# Patient Record
Sex: FEMALE | Race: WHITE | Employment: OTHER | URBAN - METROPOLITAN AREA
[De-identification: names, ages, dates, MRNs, and addresses within clinical notes are randomized per-mention and may not be internally consistent; named-entity substitution may affect disease eponyms.]

---

## 2017-04-07 ENCOUNTER — OFFICE VISIT (OUTPATIENT)
Dept: INTERNAL MEDICINE CLINIC | Age: 67
End: 2017-04-07

## 2017-04-07 VITALS
SYSTOLIC BLOOD PRESSURE: 138 MMHG | WEIGHT: 154.6 LBS | BODY MASS INDEX: 23.43 KG/M2 | DIASTOLIC BLOOD PRESSURE: 70 MMHG | HEIGHT: 68 IN | HEART RATE: 88 BPM

## 2017-04-07 DIAGNOSIS — Z00.00 ROUTINE GENERAL MEDICAL EXAMINATION AT A HEALTH CARE FACILITY: Primary | ICD-10-CM

## 2017-04-07 LAB
ALBUMIN SERPL-MCNC: 4.8 G/DL (ref 3.4–5)
ANION GAP SERPL CALCULATED.3IONS-SCNC: 18 MMOL/L (ref 3–16)
BUN BLDV-MCNC: 19 MG/DL (ref 7–20)
CALCIUM SERPL-MCNC: 10 MG/DL (ref 8.3–10.6)
CHLORIDE BLD-SCNC: 96 MMOL/L (ref 99–110)
CHOLESTEROL, TOTAL: 206 MG/DL (ref 0–199)
CO2: 24 MMOL/L (ref 21–32)
CREAT SERPL-MCNC: 0.7 MG/DL (ref 0.6–1.2)
GFR AFRICAN AMERICAN: >60
GFR NON-AFRICAN AMERICAN: >60
GLUCOSE BLD-MCNC: 97 MG/DL (ref 70–99)
HDLC SERPL-MCNC: 116 MG/DL (ref 40–60)
LDL CHOLESTEROL CALCULATED: 82 MG/DL
PHOSPHORUS: 3.7 MG/DL (ref 2.5–4.9)
POTASSIUM SERPL-SCNC: 4.5 MMOL/L (ref 3.5–5.1)
SODIUM BLD-SCNC: 138 MMOL/L (ref 136–145)
TRIGL SERPL-MCNC: 40 MG/DL (ref 0–150)
VLDLC SERPL CALC-MCNC: 8 MG/DL

## 2017-04-07 PROCEDURE — G0438 PPPS, INITIAL VISIT: HCPCS | Performed by: INTERNAL MEDICINE

## 2017-04-07 PROCEDURE — 90670 PCV13 VACCINE IM: CPT | Performed by: INTERNAL MEDICINE

## 2017-04-07 PROCEDURE — G0009 ADMIN PNEUMOCOCCAL VACCINE: HCPCS | Performed by: INTERNAL MEDICINE

## 2017-04-07 RX ORDER — LOVASTATIN 20 MG/1
20 TABLET ORAL NIGHTLY
Qty: 90 TABLET | Refills: 3 | Status: SHIPPED | OUTPATIENT
Start: 2017-04-07 | End: 2018-03-12 | Stop reason: SDUPTHER

## 2017-04-07 RX ORDER — LISINOPRIL AND HYDROCHLOROTHIAZIDE 12.5; 1 MG/1; MG/1
1 TABLET ORAL DAILY
Qty: 90 TABLET | Refills: 3 | Status: SHIPPED | OUTPATIENT
Start: 2017-04-07 | End: 2018-03-12 | Stop reason: SDUPTHER

## 2017-04-07 RX ORDER — CHOLECALCIFEROL (VITAMIN D3) 125 MCG
500 CAPSULE ORAL DAILY
COMMUNITY

## 2017-04-07 ASSESSMENT — LIFESTYLE VARIABLES
AUDIT-C TOTAL SCORE: 4
HOW OFTEN DO YOU HAVE SIX OR MORE DRINKS ON ONE OCCASION: 0
HOW MANY STANDARD DRINKS CONTAINING ALCOHOL DO YOU HAVE ON A TYPICAL DAY: 0
HOW OFTEN DO YOU HAVE A DRINK CONTAINING ALCOHOL: 4

## 2017-04-07 ASSESSMENT — ANXIETY QUESTIONNAIRES
GAD7 TOTAL SCORE: 2
GAD7 TOTAL SCORE: 2

## 2017-04-07 ASSESSMENT — PATIENT HEALTH QUESTIONNAIRE - PHQ9: SUM OF ALL RESPONSES TO PHQ QUESTIONS 1-9: 0

## 2017-09-27 ENCOUNTER — NURSE ONLY (OUTPATIENT)
Dept: INTERNAL MEDICINE CLINIC | Age: 67
End: 2017-09-27

## 2017-09-27 DIAGNOSIS — Z23 NEED FOR INFLUENZA VACCINATION: Primary | ICD-10-CM

## 2017-09-27 PROCEDURE — 90662 IIV NO PRSV INCREASED AG IM: CPT | Performed by: INTERNAL MEDICINE

## 2017-09-27 PROCEDURE — G0008 ADMIN INFLUENZA VIRUS VAC: HCPCS | Performed by: INTERNAL MEDICINE

## 2017-11-07 ENCOUNTER — HOSPITAL ENCOUNTER (OUTPATIENT)
Dept: MAMMOGRAPHY | Age: 67
Discharge: HOME | End: 2017-11-07
Attending: INTERNAL MEDICINE | Admitting: INTERNAL MEDICINE

## 2017-11-07 DIAGNOSIS — Z12.31 VISIT FOR SCREENING MAMMOGRAM: ICD-10-CM

## 2018-01-25 ENCOUNTER — HOSPITAL ENCOUNTER (OUTPATIENT)
Dept: ENDOSCOPY | Age: 68
Discharge: HOME | End: 2018-01-25
Attending: INTERNAL MEDICINE | Admitting: INTERNAL MEDICINE

## 2018-01-25 NOTE — ANESTHESIA PRE-OP
(MEVACOR) 20 MG tablet Take 1 tablet by mouth nightly 90 tablet 3    Calcium Carbonate-Vitamin D (CALCIUM + D PO) Take 1 tablet by mouth 2 times daily.  Glucosamine-Chondroit-Vit C-Mn (GLUCOSAMINE CHONDR 1500 COMPLX) CAPS Take 1 tablet by mouth 2 times daily.  Multiple Vitamins-Minerals (ONE-A-DAY WOMENS 50 PLUS PO) Take 1 tablet by mouth daily.  Loratadine (CLARITIN PO) Take 1 tablet by mouth daily.  Cranberry 1000 MG CAPS Take 1 tablet by mouth 2 times daily.  niacin 250 MG tablet Take 250 mg by mouth daily (with breakfast).  Polyethylene Glycol 3350 (MIRALAX PO) Take  by mouth. No current facility-administered medications for this encounter. Allergies:  No Known Allergies    Problem List:    Patient Active Problem List   Diagnosis Code    Hyperlipidemia E78.5    Essential hypertension, benign I10       Past Medical History:        Diagnosis Date    Hyperlipidemia     Hypertension        Past Surgical History:        Procedure Laterality Date    MIDDLE EAR SURGERY      Done 2 times.  VARICOSE VEIN SURGERY         Social History:    Social History   Substance Use Topics    Smoking status: Never Smoker    Smokeless tobacco: Never Used    Alcohol use 8.4 oz/week     14 Glasses of wine per week      Comment: Couple of drinks nightly                                 Counseling given: Not Answered      Vital Signs (Current): There were no vitals filed for this visit. BP Readings from Last 3 Encounters:   04/07/17 138/70   04/01/16 128/76   10/01/15 122/78       NPO Status:                                                                                 BMI:   Wt Readings from Last 3 Encounters:   01/16/18 150 lb (68 kg)   04/07/17 154 lb 9.6 oz (70.1 kg)   04/01/16 157 lb 12.8 oz (71.6 kg)     There is no height or weight on file to calculate BMI.     Anesthesia Evaluation  Patient summary reviewed and Nursing notes

## 2018-01-26 ENCOUNTER — PATIENT MESSAGE (OUTPATIENT)
Dept: INTERNAL MEDICINE CLINIC | Age: 68
End: 2018-01-26

## 2018-01-26 DIAGNOSIS — M25.561 CHRONIC PAIN OF RIGHT KNEE: Primary | ICD-10-CM

## 2018-01-26 DIAGNOSIS — M17.11 ARTHRITIS OF RIGHT KNEE: Primary | ICD-10-CM

## 2018-01-26 DIAGNOSIS — G89.29 CHRONIC PAIN OF RIGHT KNEE: Primary | ICD-10-CM

## 2018-01-29 ENCOUNTER — HOSPITAL ENCOUNTER (OUTPATIENT)
Dept: OTHER | Age: 68
Discharge: HOME | End: 2018-01-29
Attending: INTERNAL MEDICINE | Admitting: INTERNAL MEDICINE

## 2018-01-29 DIAGNOSIS — M25.561 CHRONIC PAIN OF RIGHT KNEE: ICD-10-CM

## 2018-01-29 DIAGNOSIS — G89.29 CHRONIC PAIN OF RIGHT KNEE: ICD-10-CM

## 2018-02-05 ENCOUNTER — OFFICE VISIT (OUTPATIENT)
Dept: ORTHOPEDIC SURGERY | Age: 68
End: 2018-02-05

## 2018-02-05 VITALS
HEIGHT: 68 IN | HEART RATE: 84 BPM | BODY MASS INDEX: 22.28 KG/M2 | DIASTOLIC BLOOD PRESSURE: 73 MMHG | SYSTOLIC BLOOD PRESSURE: 126 MMHG | WEIGHT: 147 LBS

## 2018-02-05 DIAGNOSIS — M17.11 PRIMARY OSTEOARTHRITIS OF RIGHT KNEE: Primary | ICD-10-CM

## 2018-02-05 PROCEDURE — 20610 DRAIN/INJ JOINT/BURSA W/O US: CPT | Performed by: ORTHOPAEDIC SURGERY

## 2018-02-05 PROCEDURE — 99203 OFFICE O/P NEW LOW 30 MIN: CPT | Performed by: ORTHOPAEDIC SURGERY

## 2018-02-05 RX ORDER — BETAMETHASONE SODIUM PHOSPHATE AND BETAMETHASONE ACETATE 3; 3 MG/ML; MG/ML
12 INJECTION, SUSPENSION INTRA-ARTICULAR; INTRALESIONAL; INTRAMUSCULAR; SOFT TISSUE ONCE
Status: COMPLETED | OUTPATIENT
Start: 2018-02-05 | End: 2018-02-05

## 2018-02-05 RX ADMIN — BETAMETHASONE SODIUM PHOSPHATE AND BETAMETHASONE ACETATE 12 MG: 3; 3 INJECTION, SUSPENSION INTRA-ARTICULAR; INTRALESIONAL; INTRAMUSCULAR; SOFT TISSUE at 17:03

## 2018-02-05 NOTE — PROGRESS NOTES
Date of Encounter: 2/5/2018  Patient [facility] Record Number: [id number]    Chief Complaint   Patient presents with    Knee Pain     New, RT Knee pain. Dr Macedo Billing referral. XR done 1/29/18. pain for 2 years, NKI        History of Present Illness:  Dale Nguyen is a 78 y.o. female here for evaluation of her right knee. Her current symptoms are described below and I reviewed them with her today. Patient presented at the recommendation of Dr. Valerie Real, her PCP. Patient has been having right knee pain for several years now that is intermittent however the last several months pain has become much more persistent. Pain is worse with activity. She rates her pain 6/10. She does occasionally have popping in the right knee. She has noticed herself starting to limp and states after much activity she then begins having low back pain because of the way she is walking. She denies pain in her right hip or ankle. She denies numbness or tingling in the right leg. She has found herself taking more Advil on a daily basis. Pain Assessment  Location of Pain: Knee  Location Modifiers: Right  Severity of Pain: 6  Quality of Pain: Popping, Aching, Dull  Duration of Pain: A few days  Frequency of Pain: Constant  Aggravating Factors: Walking, Bending  Limiting Behavior: Some  Relieving Factors: Rest  Result of Injury: No  Work-Related Injury: No  Are there other pain locations you wish to document?: No    Past Medical History:   Diagnosis Date    Hyperlipidemia     Hypertension        Past Surgical History:   Procedure Laterality Date    MIDDLE EAR SURGERY      Done 2 times.      VARICOSE VEIN SURGERY         Current Outpatient Prescriptions   Medication Sig Dispense Refill    ibuprofen (ADVIL;MOTRIN) 200 MG tablet Take 200 mg by mouth every 6 hours as needed for Pain      vitamin B-12 (CYANOCOBALAMIN) 500 MCG tablet Take 500 mcg by mouth daily      lisinopril-hydrochlorothiazide (PRINZIDE;ZESTORETIC) 10-12.5 MG per tablet Take 1 tablet by mouth daily 90 tablet 3    lovastatin (MEVACOR) 20 MG tablet Take 1 tablet by mouth nightly 90 tablet 3    Calcium Carbonate-Vitamin D (CALCIUM + D PO) Take 1 tablet by mouth 2 times daily.  Glucosamine-Chondroit-Vit C-Mn (GLUCOSAMINE CHONDR 1500 COMPLX) CAPS Take 1 tablet by mouth 2 times daily.  Multiple Vitamins-Minerals (ONE-A-DAY WOMENS 50 PLUS PO) Take 1 tablet by mouth daily.  Loratadine (CLARITIN PO) Take 1 tablet by mouth daily.  Cranberry 1000 MG CAPS Take 1 tablet by mouth 2 times daily.  niacin 250 MG tablet Take 250 mg by mouth daily (with breakfast).  Polyethylene Glycol 3350 (MIRALAX PO) Take  by mouth. Current Facility-Administered Medications   Medication Dose Route Frequency Provider Last Rate Last Dose    betamethasone acetate-betamethasone sodium phosphate (CELESTONE) injection 12 mg  12 mg Intra-articular Once General MD Diann          allergies, social and family histories were reviewed and updated as appropriate. Review of Systems:  Relevant review of systems reviewed and available in the patient's chart    Vital Signs:  /73   Pulse 84   Ht 5' 8\" (1.727 m)   Wt 147 lb (66.7 kg)   LMP 02/14/1992   BMI 22.35 kg/m²     General Exam:   Constitutional: She is adequately groomed with no evidence of malnutrition  Mental Status: She is oriented to time, place and person. Normal mentation and affect for age. Lymphatic: The lymphatic examination bilaterally reveals all areas to be without enlargement or induration. Vascular: Examination reveals no swelling or calf tenderness. Peripheral pulses are palpable and 2+. Neurological: She has good coordination. There is no focal weakness or sensory deficit. Right Knee Examination:    Inspection:  There is mild appreciable joint effusion. There is no erythema or signs of septic joint.   Patient is mildly valgus    Palpation:  Patient has mild tenderness on understands and accepts this course of care    During this examination, I Nicole Shetty PA-C, functioned as a scribe for Dr. Juan Miguel Vann. The history taking and physical examination were performed by Dr. Juan Miguel Vann. The appointment was performed between the patient and Dr. Juan Miguel Vann. The above encounter was performed by me personally with Nicole Shetty PA-C serving solely as a scribe. The above note is an accurate reflection of that encounter and has been reviewed for content by me. The right knee was prepped with Betadine and 2 mL of betamethasone mixed with 5 mL 1% lidocaine plain were instilled with careful aspiration and injection under aseptic technique. She tolerated this well. [facility], [address] [place] [address] and Sports Medicine  02/05/18  4:48 PM

## 2018-03-12 ENCOUNTER — TELEPHONE (OUTPATIENT)
Dept: ORTHOPEDIC SURGERY | Age: 68
End: 2018-03-12

## 2018-03-12 DIAGNOSIS — M17.11 PRIMARY OSTEOARTHRITIS OF RIGHT KNEE: Primary | ICD-10-CM

## 2018-03-12 RX ORDER — LOVASTATIN 20 MG/1
TABLET ORAL
Qty: 90 TABLET | Refills: 0 | Status: SHIPPED | OUTPATIENT
Start: 2018-03-12 | End: 2018-04-17 | Stop reason: SDUPTHER

## 2018-03-12 RX ORDER — LISINOPRIL AND HYDROCHLOROTHIAZIDE 12.5; 1 MG/1; MG/1
TABLET ORAL
Qty: 90 TABLET | Refills: 0 | Status: SHIPPED | OUTPATIENT
Start: 2018-03-12 | End: 2018-04-17 | Stop reason: SDUPTHER

## 2018-03-23 ENCOUNTER — OFFICE VISIT (OUTPATIENT)
Dept: ORTHOPEDIC SURGERY | Age: 68
End: 2018-03-23

## 2018-03-23 VITALS
DIASTOLIC BLOOD PRESSURE: 89 MMHG | WEIGHT: 147 LBS | HEIGHT: 68 IN | SYSTOLIC BLOOD PRESSURE: 169 MMHG | HEART RATE: 78 BPM | BODY MASS INDEX: 22.28 KG/M2

## 2018-03-23 DIAGNOSIS — M17.11 PRIMARY OSTEOARTHRITIS OF RIGHT KNEE: Primary | ICD-10-CM

## 2018-03-23 PROCEDURE — 99999 PR OFFICE/OUTPT VISIT,PROCEDURE ONLY: CPT | Performed by: ORTHOPAEDIC SURGERY

## 2018-03-23 PROCEDURE — 20610 DRAIN/INJ JOINT/BURSA W/O US: CPT | Performed by: ORTHOPAEDIC SURGERY

## 2018-03-23 NOTE — PROGRESS NOTES
Subjective:  right knee pain. She is here for a Synvisc One injectionfor the  right knee(s). Objective:   Blood pressure (!) 169/89, pulse 78, height 5' 8\" (1.727 m), weight 147 lb (66.7 kg), last menstrual period 02/14/1992. There is a milld joint effusion noted of the right knee(s). There is mild pain with range of motion testing. There is no significant  instability noted. Neuro exam grossly intact both lower extremities. Intact sensation to light touch. Motor exam 4+ to 5/5 in all major motor groups. Negative Dumont's sign. Skin is warm, dry and intact with out erythema or significant increased temperature around the knee joint(s). Assessment:  Degenerative Joint Disease of the right Knee(s). Plan:  Discussed Synvisc One injection(s) including all  risks and benefits including increased pain, drug reaction, infection, bleeding, lack of improvement and  neurovascular injury. All the questions were answered. PROCEDURE NOTE:  PRE-PROCEDURE DIAGNOSIS: right DJD knee(s)  POST-PROCEDURE DIAGNOSIS: right DJD knee(s)    With the patient's permission, her right knee(s) was prepped in standard sterile fashion with  Alcohol. The prefilled injection of 4mL the preferred Monovisc was injected into the right lateral joint space compartment(s) without difficulty. The patient tolerated the procedure(s) well without difficulty. A band-aid was applied. POST-PROCEDURE INSTRUCTIONS GIVEN TO PATIENT: The patient was advised to ice the knee for 15-20 minutes to relieve any injection site related pain. Decrease activity for the next 24 to 48 hours. May use prescription or OTC pain relievers as needed      FOLLOW-UP:   As directed or call or return to clinic if these symptoms worsen or fail to improve as anticipated. If at any time you are concerned you may contact the office for further instructions or care.

## 2018-04-04 ENCOUNTER — TELEPHONE (OUTPATIENT)
Dept: INTERNAL MEDICINE CLINIC | Age: 68
End: 2018-04-04

## 2018-04-04 DIAGNOSIS — I10 ESSENTIAL HYPERTENSION: Primary | ICD-10-CM

## 2018-04-04 DIAGNOSIS — E78.00 HYPERCHOLESTEROLEMIA: ICD-10-CM

## 2018-04-12 RX ORDER — THIAMINE HCL 100 MG
1 TABLET ORAL DAILY
COMMUNITY
End: 2022-08-22 | Stop reason: ALTCHOICE

## 2018-04-17 ENCOUNTER — OFFICE VISIT (OUTPATIENT)
Dept: INTERNAL MEDICINE CLINIC | Age: 68
End: 2018-04-17

## 2018-04-17 VITALS
HEART RATE: 88 BPM | HEIGHT: 68 IN | WEIGHT: 148.2 LBS | BODY MASS INDEX: 22.46 KG/M2 | SYSTOLIC BLOOD PRESSURE: 116 MMHG | DIASTOLIC BLOOD PRESSURE: 70 MMHG

## 2018-04-17 DIAGNOSIS — E78.00 HYPERCHOLESTEROLEMIA: ICD-10-CM

## 2018-04-17 DIAGNOSIS — Z00.00 ROUTINE GENERAL MEDICAL EXAMINATION AT A HEALTH CARE FACILITY: Primary | ICD-10-CM

## 2018-04-17 DIAGNOSIS — I10 ESSENTIAL HYPERTENSION: ICD-10-CM

## 2018-04-17 LAB
ALBUMIN SERPL-MCNC: 4.7 G/DL (ref 3.4–5)
ANION GAP SERPL CALCULATED.3IONS-SCNC: 15 MMOL/L (ref 3–16)
BUN BLDV-MCNC: 16 MG/DL (ref 7–20)
CALCIUM SERPL-MCNC: 10.1 MG/DL (ref 8.3–10.6)
CHLORIDE BLD-SCNC: 92 MMOL/L (ref 99–110)
CHOLESTEROL, FASTING: 172 MG/DL (ref 0–199)
CO2: 26 MMOL/L (ref 21–32)
CREAT SERPL-MCNC: 0.7 MG/DL (ref 0.6–1.2)
GFR AFRICAN AMERICAN: >60
GFR NON-AFRICAN AMERICAN: >60
GLUCOSE BLD-MCNC: 100 MG/DL (ref 70–99)
HDLC SERPL-MCNC: 86 MG/DL (ref 40–60)
LDL CHOLESTEROL CALCULATED: 73 MG/DL
PHOSPHORUS: 4.2 MG/DL (ref 2.5–4.9)
POTASSIUM SERPL-SCNC: 4.6 MMOL/L (ref 3.5–5.1)
SODIUM BLD-SCNC: 133 MMOL/L (ref 136–145)
TRIGLYCERIDE, FASTING: 64 MG/DL (ref 0–150)
VLDLC SERPL CALC-MCNC: 13 MG/DL

## 2018-04-17 PROCEDURE — G0009 ADMIN PNEUMOCOCCAL VACCINE: HCPCS | Performed by: INTERNAL MEDICINE

## 2018-04-17 PROCEDURE — G0439 PPPS, SUBSEQ VISIT: HCPCS | Performed by: INTERNAL MEDICINE

## 2018-04-17 PROCEDURE — 90732 PPSV23 VACC 2 YRS+ SUBQ/IM: CPT | Performed by: INTERNAL MEDICINE

## 2018-04-17 RX ORDER — LISINOPRIL AND HYDROCHLOROTHIAZIDE 12.5; 1 MG/1; MG/1
1 TABLET ORAL DAILY
Qty: 90 TABLET | Refills: 3 | Status: SHIPPED | OUTPATIENT
Start: 2018-04-17 | End: 2019-06-03 | Stop reason: SDUPTHER

## 2018-04-17 RX ORDER — LOVASTATIN 20 MG/1
20 TABLET ORAL NIGHTLY
Qty: 90 TABLET | Refills: 3 | Status: SHIPPED | OUTPATIENT
Start: 2018-04-17 | End: 2019-06-03 | Stop reason: SDUPTHER

## 2018-04-17 ASSESSMENT — ANXIETY QUESTIONNAIRES: GAD7 TOTAL SCORE: 1

## 2018-04-17 ASSESSMENT — LIFESTYLE VARIABLES
HOW OFTEN DO YOU HAVE SIX OR MORE DRINKS ON ONE OCCASION: 0
AUDIT-C TOTAL SCORE: 4
HOW MANY STANDARD DRINKS CONTAINING ALCOHOL DO YOU HAVE ON A TYPICAL DAY: 0
HOW OFTEN DO YOU HAVE A DRINK CONTAINING ALCOHOL: 4

## 2018-04-17 ASSESSMENT — PATIENT HEALTH QUESTIONNAIRE - PHQ9: SUM OF ALL RESPONSES TO PHQ QUESTIONS 1-9: 0

## 2018-09-24 ENCOUNTER — OFFICE VISIT (OUTPATIENT)
Dept: AUDIOLOGY | Age: 68
End: 2018-09-24
Payer: MEDICARE

## 2018-09-24 PROCEDURE — 92553 AUDIOMETRY AIR & BONE: CPT | Performed by: AUDIOLOGIST

## 2018-09-26 ENCOUNTER — NURSE ONLY (OUTPATIENT)
Dept: INTERNAL MEDICINE CLINIC | Age: 68
End: 2018-09-26
Payer: MEDICARE

## 2018-09-26 DIAGNOSIS — Z23 NEED FOR INFLUENZA VACCINATION: Primary | ICD-10-CM

## 2018-09-26 PROCEDURE — 90662 IIV NO PRSV INCREASED AG IM: CPT | Performed by: INTERNAL MEDICINE

## 2018-09-26 PROCEDURE — G0008 ADMIN INFLUENZA VIRUS VAC: HCPCS | Performed by: INTERNAL MEDICINE

## 2018-11-14 ENCOUNTER — HOSPITAL ENCOUNTER (OUTPATIENT)
Dept: WOMENS IMAGING | Age: 68
Discharge: HOME | End: 2018-11-14
Payer: MEDICARE

## 2018-11-14 DIAGNOSIS — Z12.31 ENCOUNTER FOR SCREENING MAMMOGRAM FOR BREAST CANCER: ICD-10-CM

## 2018-11-14 PROCEDURE — 77063 BREAST TOMOSYNTHESIS BI: CPT

## 2019-02-19 ENCOUNTER — OFFICE VISIT (OUTPATIENT)
Dept: AUDIOLOGY | Age: 69
End: 2019-02-19

## 2019-02-19 DIAGNOSIS — Z97.4 WEARS HEARING AID: Primary | ICD-10-CM

## 2019-02-19 PROCEDURE — 99999 PR OFFICE/OUTPT VISIT,PROCEDURE ONLY: CPT | Performed by: AUDIOLOGIST

## 2019-04-22 ENCOUNTER — CLINICAL DOCUMENTATION (OUTPATIENT)
Dept: AUDIOLOGY | Age: 69
End: 2019-04-22

## 2019-05-07 ENCOUNTER — OFFICE VISIT (OUTPATIENT)
Dept: AUDIOLOGY | Age: 69
End: 2019-05-07

## 2019-05-07 DIAGNOSIS — Z46.1 HEARING AID FITTING OR ADJUSTMENT: Primary | ICD-10-CM

## 2019-05-07 PROCEDURE — 99999 PR OFFICE/OUTPT VISIT,PROCEDURE ONLY: CPT | Performed by: AUDIOLOGIST

## 2019-06-03 ENCOUNTER — OFFICE VISIT (OUTPATIENT)
Dept: AUDIOLOGY | Age: 69
End: 2019-06-03

## 2019-06-03 ENCOUNTER — OFFICE VISIT (OUTPATIENT)
Dept: INTERNAL MEDICINE CLINIC | Age: 69
End: 2019-06-03
Payer: MEDICARE

## 2019-06-03 ENCOUNTER — OFFICE VISIT (OUTPATIENT)
Dept: ORTHOPEDIC SURGERY | Age: 69
End: 2019-06-03
Payer: MEDICARE

## 2019-06-03 VITALS
HEIGHT: 68 IN | SYSTOLIC BLOOD PRESSURE: 126 MMHG | HEART RATE: 72 BPM | BODY MASS INDEX: 21.98 KG/M2 | DIASTOLIC BLOOD PRESSURE: 72 MMHG | WEIGHT: 145 LBS

## 2019-06-03 VITALS
HEIGHT: 68 IN | BODY MASS INDEX: 21.98 KG/M2 | SYSTOLIC BLOOD PRESSURE: 152 MMHG | DIASTOLIC BLOOD PRESSURE: 83 MMHG | WEIGHT: 145 LBS | HEART RATE: 69 BPM

## 2019-06-03 DIAGNOSIS — Z00.00 ROUTINE GENERAL MEDICAL EXAMINATION AT A HEALTH CARE FACILITY: Primary | ICD-10-CM

## 2019-06-03 DIAGNOSIS — M17.11 PRIMARY OSTEOARTHRITIS OF RIGHT KNEE: Primary | ICD-10-CM

## 2019-06-03 DIAGNOSIS — Z92.89 HISTORY OF USE OF HEARING AID IN LEFT EAR: Primary | ICD-10-CM

## 2019-06-03 PROCEDURE — 99213 OFFICE O/P EST LOW 20 MIN: CPT | Performed by: ORTHOPAEDIC SURGERY

## 2019-06-03 PROCEDURE — 99999 PR OFFICE/OUTPT VISIT,PROCEDURE ONLY: CPT | Performed by: AUDIOLOGIST

## 2019-06-03 PROCEDURE — G0439 PPPS, SUBSEQ VISIT: HCPCS | Performed by: INTERNAL MEDICINE

## 2019-06-03 RX ORDER — LOVASTATIN 20 MG/1
20 TABLET ORAL NIGHTLY
Qty: 90 TABLET | Refills: 3 | Status: SHIPPED | OUTPATIENT
Start: 2019-06-03 | End: 2020-01-22 | Stop reason: SDUPTHER

## 2019-06-03 RX ORDER — LISINOPRIL AND HYDROCHLOROTHIAZIDE 12.5; 1 MG/1; MG/1
1 TABLET ORAL DAILY
Qty: 90 TABLET | Refills: 3 | Status: SHIPPED | OUTPATIENT
Start: 2019-06-03 | End: 2020-01-22 | Stop reason: SDUPTHER

## 2019-06-03 ASSESSMENT — LIFESTYLE VARIABLES
HOW OFTEN DURING THE LAST YEAR HAVE YOU NEEDED AN ALCOHOLIC DRINK FIRST THING IN THE MORNING TO GET YOURSELF GOING AFTER A NIGHT OF HEAVY DRINKING: 0
HAS A RELATIVE, FRIEND, DOCTOR, OR ANOTHER HEALTH PROFESSIONAL EXPRESSED CONCERN ABOUT YOUR DRINKING OR SUGGESTED YOU CUT DOWN: 0
HOW MANY STANDARD DRINKS CONTAINING ALCOHOL DO YOU HAVE ON A TYPICAL DAY: 0
HOW OFTEN DURING THE LAST YEAR HAVE YOU FOUND THAT YOU WERE NOT ABLE TO STOP DRINKING ONCE YOU HAD STARTED: 0
HOW OFTEN DO YOU HAVE A DRINK CONTAINING ALCOHOL: 4
HAVE YOU OR SOMEONE ELSE BEEN INJURED AS A RESULT OF YOUR DRINKING: 0
AUDIT TOTAL SCORE: 4
AUDIT-C TOTAL SCORE: 4
HOW OFTEN DURING THE LAST YEAR HAVE YOU BEEN UNABLE TO REMEMBER WHAT HAPPENED THE NIGHT BEFORE BECAUSE YOU HAD BEEN DRINKING: 0
HOW OFTEN DO YOU HAVE SIX OR MORE DRINKS ON ONE OCCASION: 0
HOW OFTEN DURING THE LAST YEAR HAVE YOU FAILED TO DO WHAT WAS NORMALLY EXPECTED FROM YOU BECAUSE OF DRINKING: 0
HOW OFTEN DURING THE LAST YEAR HAVE YOU HAD A FEELING OF GUILT OR REMORSE AFTER DRINKING: 0

## 2019-06-03 ASSESSMENT — ANXIETY QUESTIONNAIRES: GAD7 TOTAL SCORE: 1

## 2019-06-03 ASSESSMENT — PATIENT HEALTH QUESTIONNAIRE - PHQ9
SUM OF ALL RESPONSES TO PHQ QUESTIONS 1-9: 0
SUM OF ALL RESPONSES TO PHQ QUESTIONS 1-9: 0

## 2019-06-03 NOTE — PATIENT INSTRUCTIONS
Personalized Preventive Plan for Nik Sensing - 6/3/2019  Medicare offers a range of preventive health benefits. Some of the tests and screenings are paid in full while other may be subject to a deductible, co-insurance, and/or copay. Some of these benefits include a comprehensive review of your medical history including lifestyle, illnesses that may run in your family, and various assessments and screenings as appropriate. After reviewing your medical record and screening and assessments performed today your provider may have ordered immunizations, labs, imaging, and/or referrals for you. A list of these orders (if applicable) as well as your Preventive Care list are included within your After Visit Summary for your review. Other Preventive Recommendations:    · A preventive eye exam performed by an eye specialist is recommended every 1-2 years to screen for glaucoma; cataracts, macular degeneration, and other eye disorders. · A preventive dental visit is recommended every 6 months. · Try to get at least 150 minutes of exercise per week or 10,000 steps per day on a pedometer . · Order or download the FREE \"Exercise & Physical Activity: Your Everyday Guide\" from [employer]. Call 4-484.201.1150 or search [employer] online. · You need 5366-9686 mg of calcium and 3521-9811 IU of vitamin D per day. It is possible to meet your calcium requirement with diet alone, but a vitamin D supplement is usually necessary to meet this goal.  · When exposed to the sun, use a sunscreen that protects against both UVA and UVB radiation with an SPF of 30 or greater. Reapply every 2 to 3 hours or after sweating, drying off with a towel, or swimming. · Always wear a seat belt when traveling in a car. Always wear a helmet when riding a bicycle or motorcycle.

## 2019-06-03 NOTE — PROGRESS NOTES
Medicare Annual Wellness Visit  Name: Froylan Vidal Date: 6/3/2019   MRN: [id number] Sex: Female   Age: 80 y.o. Ethnicity: Non-/Non    : 1950 Race: Deedee Pelletier is here for Medicare AWV    Screenings for behavioral, psychosocial and functional/safety risks, and cognitive dysfunction are all negative except as indicated below. These results, as well as other patient data from the [address] form, are documented in Flowsheets linked to this Encounter. No Known Allergies  Prior to Visit Medications    Medication Sig Taking? Authorizing Provider   lovastatin (MEVACOR) 20 MG tablet Take 1 tablet by mouth nightly Yes Riddhi Martinez MD   lisinopril-hydrochlorothiazide (PRINZIDE;ZESTORETIC) 10-12.5 MG per tablet Take 1 tablet by mouth daily Yes Riddhi Martinez MD   Magnesium 500 MG TABS Take 1 tablet by mouth daily Yes Historical Provider, MD   ibuprofen (ADVIL;MOTRIN) 200 MG tablet Take 200 mg by mouth every 6 hours as needed for Pain Yes Historical Provider, MD   vitamin B-12 (CYANOCOBALAMIN) 500 MCG tablet Take 500 mcg by mouth daily Yes Historical Provider, MD   Calcium Carbonate-Vitamin D (CALCIUM + D PO) Take 1 tablet by mouth daily  Yes Historical Provider, MD   Glucosamine-Chondroit-Vit C-Mn (GLUCOSAMINE CHONDR 1500 COMPLX) CAPS Take 1 tablet by mouth daily  Yes Historical Provider, MD   Multiple Vitamins-Minerals (ONE-A-DAY WOMENS 50 PLUS PO) Take 1 tablet by mouth daily. Yes Historical Provider, MD   Loratadine (CLARITIN PO) Take 1 tablet by mouth daily. Yes Historical Provider, MD   Cranberry 1000 MG CAPS Take 1 tablet by mouth daily  Yes Historical Provider, MD   niacin 250 MG tablet Take 250 mg by mouth daily (with breakfast).  Yes Historical Provider, MD   Polyethylene Glycol 3350 (MIRALAX PO) Take by mouth daily  Yes Historical Provider, MD     Past Medical History:   Diagnosis Date    Arthritis     knee    Hyperlipidemia     Hypertension Past Surgical History:   Procedure Laterality Date    MIDDLE EAR SURGERY      Done 2 times.  VARICOSE VEIN SURGERY       Family History   Problem Relation Age of Onset    Mental Illness Mother     Depression Mother     High Blood Pressure Father     Cancer Paternal Aunt        CareTeam (Including outside providers/suppliers regularly involved in providing care):   Patient Care Team:  Abiodun Fenton MD as PCP - General (Internal Medicine)  Abiodun Fenton MD as PCP - [facility]    Wt Readings from Last 3 Encounters:   06/03/19 145 lb (65.8 kg)   06/03/19 145 lb (65.8 kg)   06/03/19 145 lb (65.8 kg)     Vitals:    06/03/19 1437   BP: 126/72   Site: Left Upper Arm   Position: Sitting   Cuff Size: Large Adult   Pulse: 72   Weight: 145 lb (65.8 kg)   Height: 5' 8\" (1.727 m)     Body mass index is 22.05 kg/m². Based upon direct observation of the patient, evaluation of cognition reveals recent and remote memory intact. GEN: WN/WD, NAD  CV: regular rate and rhythm, no murmurs rubs or gallops  Resp: normal effort, clear auscultation bilaterally  No peripheral edema     Patient's complete Health Risk Assessment and screening values have been reviewed and are found in Flowsheets. The following problems were reviewed today and where indicated follow up appointments were made and/or referrals ordered. Positive Risk Factor Screenings with Interventions:     General Health:  General  In general, how would you say your health is?: Very Good  In the past 7 days, have you experienced any of the following?  New or Increased Pain, New or Increased Fatigue, Loneliness, Social Isolation, Stress or Anger?: (!) Loneliness  Do you get the social and emotional support that you need?: Yes  Do you have a Living Will?: Yes  General Health Risk Interventions:  · discussed loneliness, she has good social support    Hearing/Vision:  Hearing/Vision  Do you or your family notice any trouble with your hearing?: (!) Yes  Do you have difficulty driving, watching TV, or doing any of your daily activities because of your eyesight?: No  Have you had an eye exam within the past year?: Yes  Hearing/Vision Interventions:  · Hearing concerns:  has hearing aids    Safety:  Safety  Have all throw rugs been removed or fastened?: (!) No  Do you have non-slip mats in all bathtubs?: Yes  Do all of your stairways have a railing or banister?: Yes  Are your doorways, halls and stairs free of clutter?: Yes  Do you always fasten your seatbelt when you are in a car?: Yes  Safety Interventions:  · Home safety tips provided    Personalized Preventive Plan   Current Health Maintenance Status  Immunization History   Administered Date(s) Administered    Influenza Virus Vaccine 10/01/2015    Influenza, High Dose (Fluzone 65 yrs and older) 09/14/2016, 09/27/2017, 09/26/2018    Pneumococcal 13-valent Conjugate ([id number]) 04/07/2017    Pneumococcal Polysaccharide ([id number]) 04/17/2018        Health Maintenance   Topic Date Due    Hepatitis C screen  1950    Shingles Vaccine (1 of 2) 08/28/2001    Potassium monitoring  04/17/2019    Creatinine monitoring  04/17/2019    DTaP/Tdap/Td vaccine (1 - Tdap) 01/01/2099 (Originally 8/28/1970)    Breast cancer screen  11/14/2020    Lipid screen  04/17/2023    Colon cancer screen colonoscopy  01/25/2028    DEXA (modify frequency per FRAX score)  Completed    Flu vaccine  Completed    Pneumococcal 65+ years Vaccine  Completed     Recommendations for Preventive Services Due: see orders and patient instructions/AVS.  .   Recommended screening schedule for the next 5-10 years is provided to the patient in written form: see Patient Instructions/AVS.

## 2019-06-03 NOTE — PROGRESS NOTES
Patient dropped off damaged left ear aid. earmold displaced from  unit. I will check with [employer] to ensure warranty status is consistent with our records. SUAD Newsome

## 2019-06-04 LAB
ALBUMIN SERPL-MCNC: 4.7 G/DL (ref 3.4–5)
ANION GAP SERPL CALCULATED.3IONS-SCNC: 11 MMOL/L (ref 3–16)
BUN BLDV-MCNC: 13 MG/DL (ref 7–20)
CALCIUM SERPL-MCNC: 10 MG/DL (ref 8.3–10.6)
CHLORIDE BLD-SCNC: 100 MMOL/L (ref 99–110)
CHOLESTEROL, TOTAL: 202 MG/DL (ref 0–199)
CO2: 27 MMOL/L (ref 21–32)
CREAT SERPL-MCNC: 0.6 MG/DL (ref 0.6–1.2)
GFR AFRICAN AMERICAN: >60
GFR NON-AFRICAN AMERICAN: >60
GLUCOSE BLD-MCNC: 91 MG/DL (ref 70–99)
HDLC SERPL-MCNC: 114 MG/DL (ref 40–60)
LDL CHOLESTEROL CALCULATED: 76 MG/DL
PHOSPHORUS: 4 MG/DL (ref 2.5–4.9)
POTASSIUM SERPL-SCNC: 4.9 MMOL/L (ref 3.5–5.1)
SODIUM BLD-SCNC: 138 MMOL/L (ref 136–145)
TRIGL SERPL-MCNC: 58 MG/DL (ref 0–150)
VLDLC SERPL CALC-MCNC: 12 MG/DL

## 2019-06-17 ENCOUNTER — CLINICAL DOCUMENTATION (OUTPATIENT)
Dept: AUDIOLOGY | Age: 69
End: 2019-06-17

## 2019-06-18 ENCOUNTER — OFFICE VISIT (OUTPATIENT)
Dept: AUDIOLOGY | Age: 69
End: 2019-06-18

## 2019-06-18 DIAGNOSIS — Z97.4 WEARS HEARING AID IN LEFT EAR: Primary | ICD-10-CM

## 2019-06-18 PROCEDURE — 99999 PR OFFICE/OUTPT VISIT,PROCEDURE ONLY: CPT | Performed by: AUDIOLOGIST

## 2019-06-18 NOTE — PROGRESS NOTES
Subjective:      Walk-in patient to  device. Reported it needed some fine-tuning. Aid re-calibrated and low-mid frequency gain was increased. SUAD Green

## 2019-06-19 NOTE — PROGRESS NOTES
Josiah Stewart  [id number]  Encounter Date: 6/3/2019  YOB: 1950    Chief Complaint   Patient presents with    Knee Pain     f/u for RT knee pain        History:Ms. Josiah Stewart is here in follow up regarding her right knee arthritis. She last underwent Synvisc One injection in March 2018. She reports she had significant improvement in her symptoms for almost 12 months. Last couple of months she started to notice increasing discomfort with average level of 5/10 pain. Pain seems to be worse with her activities and improved by rest.  It is mostly dull and achy. She does not have any recent trauma. No numbness or tingling radiates down leg. No significant joint effusion. Exam:  BP (!) 152/83   Pulse 69   Ht 5' 8\" (1.727 m)   Wt 145 lb (65.8 kg)   LMP 02/14/1992   BMI 22.05 kg/m²   General: Alert and oriented x3, No acute distress, Cooperative and conversant. Obesity Absent   Mood and affect are appropriate. Right knee:  Trace effusion with no erythema. Mild valgus deformity. Moderate tenderness along the joint line laterally. Range of motion lacks a few degrees of extension with flexion comfortable beyond 125°. Quad strength 4+/5 hamstrings 4+/5. Gross motor to her hip and ankle are intact. Negative anterior and posterior drawer sign. Does not open to varus or valgus stress significantly at her best extension or 30° of flexion. Sensation to light touch grossly present throughout the right lower extremity  Capillary refill < 2 seconds and palpable distal pulses  Skin: no erythema, lesions or rashes  Gait: She still has an antalgic gait pattern especially when she first gets up from a chair to avoid putting full pressure on the right leg. No signs / symptoms of DVT / PE or infection    X-rays:  4 views right knee with comparison standing AP, flexion PA and skyline views left knee. Both knees show significant valgus alignment with complete loss of lateral compartment joint space.   Right side more severe than the left. Both knee show good tracking of the patellofemoral joint. No lytic or blastic lesions and metaphyseal regions of either knee. Assessment:   Diagnosis Orders   1. Primary osteoarthritis of right knee  XR Knee Bilateral Standing    XR KNEE RIGHT (3 VIEWS)    SYNVISC OR SYNVISC-ONE (For Auth/Precert)       Orders  Orders Placed This Encounter   Procedures    XR Knee Bilateral Standing    XR KNEE RIGHT (3 VIEWS)    SYNVISC OR SYNVISC-ONE (For Auth/Precert)       Plan:  While she has significant valgus deformity in bilateral knee arthritis the right side is the most symptomatic. This is responded well the past 2 joint fluid injection with Synvisc 1. I reviewed the option and she would like to go ahead and proceed with repeating her Synvisc one injection into the right knee. Since she had such good pain relief we will await approval from her insurance and she'll return to receive the injection. She understands and accepts this course of care.

## 2019-07-02 ENCOUNTER — TELEPHONE (OUTPATIENT)
Dept: ORTHOPEDIC SURGERY | Age: 69
End: 2019-07-02

## 2019-07-11 ENCOUNTER — OFFICE VISIT (OUTPATIENT)
Dept: ORTHOPEDIC SURGERY | Age: 69
End: 2019-07-11
Payer: MEDICARE

## 2019-07-11 VITALS — WEIGHT: 145 LBS | HEIGHT: 68 IN | BODY MASS INDEX: 21.98 KG/M2

## 2019-07-11 DIAGNOSIS — M17.11 PRIMARY OSTEOARTHRITIS OF RIGHT KNEE: Primary | ICD-10-CM

## 2019-07-11 PROCEDURE — 20610 DRAIN/INJ JOINT/BURSA W/O US: CPT | Performed by: PHYSICIAN ASSISTANT

## 2019-07-18 ENCOUNTER — OFFICE VISIT (OUTPATIENT)
Dept: ORTHOPEDIC SURGERY | Age: 69
End: 2019-07-18
Payer: MEDICARE

## 2019-07-18 VITALS — HEIGHT: 68 IN | WEIGHT: 145 LBS | BODY MASS INDEX: 21.98 KG/M2

## 2019-07-18 DIAGNOSIS — M17.11 PRIMARY OSTEOARTHRITIS OF RIGHT KNEE: Primary | ICD-10-CM

## 2019-07-18 PROCEDURE — 20610 DRAIN/INJ JOINT/BURSA W/O US: CPT | Performed by: PHYSICIAN ASSISTANT

## 2019-07-18 NOTE — PROGRESS NOTES
Euflexxa:  NDC: [address]-84  Lot #: [id number]  Expiration Date: 3/17/2020    Right knee    Obtained in office
next 24 to 48 hours. May use prescription or OTC pain relievers as needed. Follow-up as directed or call or return to clinic if these symptoms worsen or fail to improve as anticipated. If at any time you are concerned you may contact the office for further instructions or care. Electronically signed by Radha Pete PA-C on 3/34/6224  Board Certified [facility]    Please note that portions of this note were completed with a voice recognition program.  Efforts were made to edit the dictations but occasionally words are mis-transcribed.

## 2019-07-26 ENCOUNTER — OFFICE VISIT (OUTPATIENT)
Dept: ORTHOPEDIC SURGERY | Age: 69
End: 2019-07-26
Payer: MEDICARE

## 2019-07-26 VITALS
DIASTOLIC BLOOD PRESSURE: 73 MMHG | WEIGHT: 145 LBS | BODY MASS INDEX: 21.98 KG/M2 | HEART RATE: 67 BPM | HEIGHT: 68 IN | SYSTOLIC BLOOD PRESSURE: 142 MMHG

## 2019-07-26 DIAGNOSIS — M17.11 PRIMARY OSTEOARTHRITIS OF RIGHT KNEE: Primary | ICD-10-CM

## 2019-07-26 PROCEDURE — 20610 DRAIN/INJ JOINT/BURSA W/O US: CPT | Performed by: PHYSICIAN ASSISTANT

## 2019-08-21 ENCOUNTER — PROCEDURE VISIT (OUTPATIENT)
Dept: AUDIOLOGY | Age: 69
End: 2019-08-21

## 2019-08-21 DIAGNOSIS — Z97.4 WEARS HEARING AID IN BOTH EARS: Primary | ICD-10-CM

## 2019-09-26 DIAGNOSIS — Z23 NEED FOR INFLUENZA VACCINATION: Primary | ICD-10-CM

## 2019-09-26 PROCEDURE — 90653 IIV ADJUVANT VACCINE IM: CPT | Performed by: INTERNAL MEDICINE

## 2019-09-26 PROCEDURE — G0008 ADMIN INFLUENZA VIRUS VAC: HCPCS | Performed by: INTERNAL MEDICINE

## 2019-10-21 ENCOUNTER — OFFICE VISIT (OUTPATIENT)
Dept: INTERNAL MEDICINE CLINIC | Age: 69
End: 2019-10-21
Payer: MEDICARE

## 2019-10-21 VITALS
HEART RATE: 72 BPM | BODY MASS INDEX: 22.35 KG/M2 | SYSTOLIC BLOOD PRESSURE: 142 MMHG | WEIGHT: 147 LBS | DIASTOLIC BLOOD PRESSURE: 80 MMHG

## 2019-10-21 DIAGNOSIS — M79.672 LEFT FOOT PAIN: Primary | ICD-10-CM

## 2019-10-21 DIAGNOSIS — M25.572 ACUTE LEFT ANKLE PAIN: ICD-10-CM

## 2019-10-21 PROCEDURE — 99213 OFFICE O/P EST LOW 20 MIN: CPT | Performed by: NURSE PRACTITIONER

## 2019-10-22 ENCOUNTER — HOSPITAL ENCOUNTER (OUTPATIENT)
Age: 69
Discharge: HOME | End: 2019-10-22
Payer: MEDICARE

## 2019-10-22 ENCOUNTER — HOSPITAL ENCOUNTER (OUTPATIENT)
Dept: GENERAL RADIOLOGY | Age: 69
Discharge: HOME | End: 2019-10-22
Payer: MEDICARE

## 2019-10-22 DIAGNOSIS — M79.672 LEFT FOOT PAIN: ICD-10-CM

## 2019-10-22 DIAGNOSIS — M25.572 ACUTE LEFT ANKLE PAIN: ICD-10-CM

## 2019-10-22 PROCEDURE — 73620 X-RAY EXAM OF FOOT: CPT

## 2019-10-22 PROCEDURE — 73610 X-RAY EXAM OF ANKLE: CPT

## 2019-11-04 ENCOUNTER — OFFICE VISIT (OUTPATIENT)
Dept: ORTHOPEDIC SURGERY | Age: 69
End: 2019-11-04
Payer: MEDICARE

## 2019-11-04 VITALS
WEIGHT: 146.6 LBS | BODY MASS INDEX: 25.03 KG/M2 | HEART RATE: 82 BPM | HEIGHT: 64 IN | SYSTOLIC BLOOD PRESSURE: 147 MMHG | DIASTOLIC BLOOD PRESSURE: 81 MMHG | RESPIRATION RATE: 16 BRPM

## 2019-11-04 DIAGNOSIS — M17.11 PRIMARY OSTEOARTHRITIS OF RIGHT KNEE: Primary | ICD-10-CM

## 2019-11-04 PROCEDURE — 99214 OFFICE O/P EST MOD 30 MIN: CPT | Performed by: ORTHOPAEDIC SURGERY

## 2019-11-04 PROCEDURE — MISCCOLD COLD THERAPY UNIT AND PAD: Performed by: ORTHOPAEDIC SURGERY

## 2019-11-18 ENCOUNTER — HOSPITAL ENCOUNTER (OUTPATIENT)
Dept: PHYSICAL THERAPY | Age: 69
Setting detail: THERAPIES SERIES
Discharge: HOME | End: 2019-11-18
Payer: MEDICARE

## 2019-11-18 PROCEDURE — 97161 PT EVAL LOW COMPLEX 20 MIN: CPT

## 2019-11-18 PROCEDURE — 97530 THERAPEUTIC ACTIVITIES: CPT

## 2019-11-18 PROCEDURE — 97110 THERAPEUTIC EXERCISES: CPT

## 2019-12-03 ENCOUNTER — HOSPITAL ENCOUNTER (OUTPATIENT)
Dept: WOMENS IMAGING | Age: 69
Discharge: HOME | End: 2019-12-03
Payer: MEDICARE

## 2019-12-03 DIAGNOSIS — Z80.3 FAMILY HISTORY OF MALIGNANT NEOPLASM OF BREAST: ICD-10-CM

## 2019-12-03 DIAGNOSIS — Z12.31 ENCOUNTER FOR SCREENING MAMMOGRAM FOR BREAST CANCER: ICD-10-CM

## 2019-12-03 PROCEDURE — 77063 BREAST TOMOSYNTHESIS BI: CPT

## 2019-12-20 NOTE — PROGRESS NOTES
Name_______________________________________Printed:____________________  Date and time of surgery__1/28 0700______________________Arrival Time:_0530_______________   1. Do not eat or drink anything after 12 midnight (or____hours) prior to surgery. This includes no water, chewing gum or mints. Endoscopy patients follow your doctors bowel prep instructions,which may include taking part of prep after midnight If you have been instructed on ERAS or carb loading -please follow those instructions. 2. Take the following pills with a small sip of water on the morning of surgery__none_________________________________________________                  Do not take blood pressure medications ending in pril or sartan the matt prior to surgery or the morning of surgery_   3. Aspirin, Ibuprofen, Advil, Naproxen, Vitamin E and other Anti-inflammatory products and supplements should be stopped for 5 -7days before surgery or as directed by your physician. 4. Check with your Doctor regarding stopping Plavix, Coumadin,Eliquis, Lovenox,Effient,Pradaxa,Xarelto, Fragmin or other blood thinners and follow their instructions. 5. Do not smoke, and do not drink any alcoholic beverages 24 hours prior to surgery. This includes NA Beer. Refrain from the usage of any recreational drugs. 6. You may brush your teeth and gargle the morning of surgery. DO NOT SWALLOW WATER   7. You MUST make arrangements for a responsible adult to stay on site while you are here and take you home after your surgery. You will not be allowed to leave alone or drive yourself home. It is strongly suggested someone stay with you the first 24 hrs. Your surgery will be cancelled if you do not have a ride home. 8. A parent/legal guardian must accompany a child scheduled for surgery and plan to stay at the hospital until the child is discharged. Please do not bring other children with you.    9. Please wear simple, loose fitting clothing to the hospital.  Do not bring valuables (money, credit cards, checkbooks, etc.) Do not wear any makeup (including no eye makeup) or nail polish on your fingers or toes. 10. DO NOT wear any jewelry or piercings on day of surgery. All body piercing jewelry must be removed. 11. If you have ___dentures, they will be removed before going to the OR; we will provide you a container. If you wear ___contact lenses or ___glasses, they will be removed; please bring a case for them. 12. Please see your family doctor/pediatrician for a history & physical and/or concerning medications. Bring any test results/reports from your physician's office. PCP__________________Phone___________H&P Appt. Date________             13 If you  have a Living Will and Durable Power of  for Healthcare, please bring in a copy. 15. Notify your Surgeon if you develop any illness between now and surgery  time, cough, cold, fever, sore throat, nausea, vomiting, etc.  Please notify your surgeon if you experience dizziness, shortness of breath or blurred vision between now & the time of your surgery             15. DO NOT shave your operative site 96 hours prior to surgery. For face & neck surgery, men may use an electric razor 48 hours prior to surgery. 16. Shower the night before or morning of surgery as directed. 17. To provide excellent care visitors will be limited to one in the room at any given time. 18.  Please bring picture ID and insurance card. 19.  Visit our web site for additional information:  [email or web address]              20.During flu season no children under the age of 15 are permitted in the hospital for the safety of all patients.                               21. If you take a long acting insulin in the evening only  take half of your usual  dose the night  before your procedure              22. If you use a c-pap please bring DOS if staying

## 2019-12-30 ENCOUNTER — TELEPHONE (OUTPATIENT)
Dept: ORTHOPEDIC SURGERY | Age: 69
End: 2019-12-30

## 2020-01-17 ENCOUNTER — HOSPITAL ENCOUNTER (OUTPATIENT)
Age: 70
Discharge: HOME | End: 2020-01-17
Payer: MEDICARE

## 2020-01-17 LAB
A/G RATIO: 1.9 (ref 1.1–2.2)
ABO/RH: NORMAL
ALBUMIN SERPL-MCNC: 4.9 G/DL (ref 3.4–5)
ALP BLD-CCNC: 41 U/L (ref 40–129)
ALT SERPL-CCNC: 19 U/L (ref 10–40)
ANION GAP SERPL CALCULATED.3IONS-SCNC: 15 MMOL/L (ref 3–16)
ANTIBODY SCREEN: NORMAL
APTT: 33 SEC (ref 24.2–36.2)
AST SERPL-CCNC: 26 U/L (ref 15–37)
BASOPHILS ABSOLUTE: 0 K/UL (ref 0–0.2)
BASOPHILS RELATIVE PERCENT: 0.9 %
BILIRUB SERPL-MCNC: 0.4 MG/DL (ref 0–1)
BILIRUBIN URINE: NEGATIVE
BLOOD, URINE: NEGATIVE
BUN BLDV-MCNC: 13 MG/DL (ref 7–20)
CALCIUM SERPL-MCNC: 10.1 MG/DL (ref 8.3–10.6)
CHLORIDE BLD-SCNC: 94 MMOL/L (ref 99–110)
CLARITY: CLEAR
CO2: 26 MMOL/L (ref 21–32)
COLOR: YELLOW
CREAT SERPL-MCNC: 0.5 MG/DL (ref 0.6–1.2)
EOSINOPHILS ABSOLUTE: 0.1 K/UL (ref 0–0.6)
EOSINOPHILS RELATIVE PERCENT: 1.2 %
GFR AFRICAN AMERICAN: >60
GFR NON-AFRICAN AMERICAN: >60
GLOBULIN: 2.6 G/DL
GLUCOSE BLD-MCNC: 109 MG/DL (ref 70–99)
GLUCOSE URINE: NEGATIVE MG/DL
HCT VFR BLD CALC: 40.6 % (ref 36–48)
HEMOGLOBIN: 13.6 G/DL (ref 12–16)
INR BLD: 1.02 (ref 0.86–1.14)
KETONES, URINE: NEGATIVE MG/DL
LEUKOCYTE ESTERASE, URINE: NEGATIVE
LYMPHOCYTES ABSOLUTE: 1.1 K/UL (ref 1–5.1)
LYMPHOCYTES RELATIVE PERCENT: 23.8 %
MCH RBC QN AUTO: 31.6 PG (ref 26–34)
MCHC RBC AUTO-ENTMCNC: 33.4 G/DL (ref 31–36)
MCV RBC AUTO: 94.6 FL (ref 80–100)
MICROSCOPIC EXAMINATION: NORMAL
MONOCYTES ABSOLUTE: 0.4 K/UL (ref 0–1.3)
MONOCYTES RELATIVE PERCENT: 7.9 %
NEUTROPHILS ABSOLUTE: 3 K/UL (ref 1.7–7.7)
NEUTROPHILS RELATIVE PERCENT: 66.2 %
NITRITE, URINE: NEGATIVE
PDW BLD-RTO: 12.8 % (ref 12.4–15.4)
PH UA: 8 (ref 5–8)
PLATELET # BLD: 306 K/UL (ref 135–450)
PMV BLD AUTO: 9.5 FL (ref 5–10.5)
POTASSIUM SERPL-SCNC: 4.6 MMOL/L (ref 3.5–5.1)
PROTEIN UA: NEGATIVE MG/DL
PROTHROMBIN TIME: 11.8 SEC (ref 10–13.2)
RBC # BLD: 4.29 M/UL (ref 4–5.2)
SODIUM BLD-SCNC: 135 MMOL/L (ref 136–145)
SPECIFIC GRAVITY UA: 1.01 (ref 1–1.03)
TOTAL PROTEIN: 7.5 G/DL (ref 6.4–8.2)
URINE TYPE: NORMAL
UROBILINOGEN, URINE: 0.2 E.U./DL
VITAMIN D 25-HYDROXY: 55 NG/ML
WBC # BLD: 4.6 K/UL (ref 4–11)

## 2020-01-17 PROCEDURE — 85730 THROMBOPLASTIN TIME PARTIAL: CPT

## 2020-01-17 PROCEDURE — 81003 URINALYSIS AUTO W/O SCOPE: CPT

## 2020-01-17 PROCEDURE — 83036 HEMOGLOBIN GLYCOSYLATED A1C: CPT

## 2020-01-17 PROCEDURE — 86900 BLOOD TYPING SEROLOGIC ABO: CPT

## 2020-01-17 PROCEDURE — 80053 COMPREHEN METABOLIC PANEL: CPT

## 2020-01-17 PROCEDURE — 85610 PROTHROMBIN TIME: CPT

## 2020-01-17 PROCEDURE — 85025 COMPLETE CBC W/AUTO DIFF WBC: CPT

## 2020-01-17 PROCEDURE — 87086 URINE CULTURE/COLONY COUNT: CPT

## 2020-01-17 PROCEDURE — 86901 BLOOD TYPING SEROLOGIC RH(D): CPT

## 2020-01-17 PROCEDURE — 87081 CULTURE SCREEN ONLY: CPT

## 2020-01-17 PROCEDURE — 93005 ELECTROCARDIOGRAM TRACING: CPT | Performed by: ORTHOPAEDIC SURGERY

## 2020-01-17 PROCEDURE — 36415 COLL VENOUS BLD VENIPUNCTURE: CPT

## 2020-01-17 PROCEDURE — 82306 VITAMIN D 25 HYDROXY: CPT

## 2020-01-17 PROCEDURE — 86850 RBC ANTIBODY SCREEN: CPT

## 2020-01-18 LAB
EKG ATRIAL RATE: 66 BPM
EKG DIAGNOSIS: NORMAL
EKG P AXIS: 56 DEGREES
EKG P-R INTERVAL: 168 MS
EKG Q-T INTERVAL: 404 MS
EKG QRS DURATION: 92 MS
EKG QTC CALCULATION (BAZETT): 423 MS
EKG R AXIS: 43 DEGREES
EKG T AXIS: 41 DEGREES
EKG VENTRICULAR RATE: 66 BPM
ESTIMATED AVERAGE GLUCOSE: 111.2 MG/DL
HBA1C MFR BLD: 5.5 %
URINE CULTURE, ROUTINE: NORMAL

## 2020-01-18 PROCEDURE — 93010 ELECTROCARDIOGRAM REPORT: CPT | Performed by: INTERNAL MEDICINE

## 2020-01-19 LAB — MRSA CULTURE ONLY: NORMAL

## 2020-01-22 ENCOUNTER — OFFICE VISIT (OUTPATIENT)
Dept: INTERNAL MEDICINE CLINIC | Age: 70
End: 2020-01-22
Payer: MEDICARE

## 2020-01-22 VITALS
SYSTOLIC BLOOD PRESSURE: 126 MMHG | BODY MASS INDEX: 22.43 KG/M2 | WEIGHT: 148 LBS | HEIGHT: 68 IN | HEART RATE: 78 BPM | DIASTOLIC BLOOD PRESSURE: 80 MMHG

## 2020-01-22 PROCEDURE — G8510 SCR DEP NEG, NO PLAN REQD: HCPCS | Performed by: NURSE PRACTITIONER

## 2020-01-22 PROCEDURE — 99214 OFFICE O/P EST MOD 30 MIN: CPT | Performed by: NURSE PRACTITIONER

## 2020-01-22 RX ORDER — LISINOPRIL AND HYDROCHLOROTHIAZIDE 12.5; 1 MG/1; MG/1
1 TABLET ORAL DAILY
Qty: 90 TABLET | Refills: 3 | Status: SHIPPED | OUTPATIENT
Start: 2020-01-22 | End: 2021-03-08

## 2020-01-22 RX ORDER — LOVASTATIN 20 MG/1
20 TABLET ORAL NIGHTLY
Qty: 90 TABLET | Refills: 3 | Status: SHIPPED | OUTPATIENT
Start: 2020-01-22 | End: 2021-03-08

## 2020-01-22 ASSESSMENT — ENCOUNTER SYMPTOMS
ABDOMINAL PAIN: 0
BACK PAIN: 0
RHINORRHEA: 0
WHEEZING: 0
SHORTNESS OF BREATH: 0
APNEA: 0
EYE REDNESS: 0
NAUSEA: 0
EYE PAIN: 0
CONSTIPATION: 0
DIARRHEA: 0
VOMITING: 0
ABDOMINAL DISTENTION: 0
CHEST TIGHTNESS: 0
SINUS PRESSURE: 0
BLOOD IN STOOL: 0
COUGH: 0

## 2020-01-22 ASSESSMENT — PATIENT HEALTH QUESTIONNAIRE - PHQ9
2. FEELING DOWN, DEPRESSED OR HOPELESS: 0
SUM OF ALL RESPONSES TO PHQ QUESTIONS 1-9: 0
SUM OF ALL RESPONSES TO PHQ9 QUESTIONS 1 & 2: 0
SUM OF ALL RESPONSES TO PHQ QUESTIONS 1-9: 0
1. LITTLE INTEREST OR PLEASURE IN DOING THINGS: 0

## 2020-01-22 NOTE — PROGRESS NOTES
1/22/2020    This is a 77 y.o. female   Chief Complaint   Patient presents with    Pre-op Exam     pt is having sx on right knee arthroplasty on 1/28/2020 @[facility]- Dr. Pinky Hilario is a 77 y.o.  female who comes for a preoperative exam.  She  is referred by Dr. María Rowland for perioperative risk determination for upcoming surgery for right total knee arthroplasty on 1/28/2020 at [facility]. Denies taking any asa, blood thinners, fish oil or herbals. She stopped her NSAIDs last week. Denies any previous complications with anesthesia. Attended joint class on 1/17/2020- had labs, urine, nasal swab and EKG complete at that time. Roxie Antoine returns for follow up of hypertension. Patient has been taking Her medications as prescribed. Patient's blood pressure is  controlled. Side effects related to taking the medications include no medication side effects noted    Patient returns for follow up of hyperlipidemia. Patient has been taking Her medications as prescribed. Patient's lipids are controlled. Side effects related to taking the medications include none. She has not had clinical consequences related to hyperlipidemia including, but not limited to acute coronary syndrome, stroke or chronic kidney disease. Past Medical History:   Diagnosis Date    Arthritis     knee    Hyperlipidemia     Hypertension      Past Surgical History:   Procedure Laterality Date    MIDDLE EAR SURGERY      Done 2 times.  VARICOSE VEIN SURGERY       Social History     Socioeconomic History    Marital status:       Spouse name: Not on file    Number of children: Not on file    Years of education: Not on file    Highest education level: Not on file   Occupational History    Not on file   Social Needs    Financial resource strain: Not on file    Food insecurity:     Worry: Not on file     Inability: Not on file    Transportation needs:     Medical: Not on file     Non-medical: Not on file Tobacco Use    Smoking status: Never Smoker    Smokeless tobacco: Never Used   Substance and Sexual Activity    Alcohol use:  Yes     Alcohol/week: 14.0 standard drinks     Types: 14 Glasses of wine per week     Comment: Couple of drinks nightly     Drug use: Never    Sexual activity: Not Currently     Partners: Male     Comment:    Lifestyle    Physical activity:     Days per week: Not on file     Minutes per session: Not on file    Stress: Not on file   Relationships    Social connections:     Talks on phone: Not on file     Gets together: Not on file     Attends Christian service: Not on file     Active member of club or organization: Not on file     Attends meetings of clubs or organizations: Not on file     Relationship status: Not on file    Intimate partner violence:     Fear of current or ex partner: Not on file     Emotionally abused: Not on file     Physically abused: Not on file     Forced sexual activity: Not on file   Other Topics Concern    Not on file   Social History Narrative    Not on file       Family History   Problem Relation Age of Onset    Mental Illness Mother     Depression Mother     High Blood Pressure Father     Cancer Paternal Aunt        Current Outpatient Medications   Medication Sig Dispense Refill    lovastatin (MEVACOR) 20 MG tablet Take 1 tablet by mouth nightly 90 tablet 3    lisinopril-hydrochlorothiazide (PRINZIDE;ZESTORETIC) 10-12.5 MG per tablet Take 1 tablet by mouth daily 90 tablet 3    Magnesium 500 MG TABS Take 1 tablet by mouth daily      ibuprofen (ADVIL;MOTRIN) 200 MG tablet Take 200 mg by mouth every 6 hours as needed for Pain      vitamin B-12 (CYANOCOBALAMIN) 500 MCG tablet Take 500 mcg by mouth daily      Calcium Carbonate-Vitamin D (CALCIUM + D PO) Take 1 tablet by mouth daily       Glucosamine-Chondroit-Vit C-Mn (GLUCOSAMINE CHONDR 1500 COMPLX) CAPS Take 1 tablet by mouth daily       Multiple Vitamins-Minerals (ONE-A-DAY WOMENS 50 PLUS PO) Take 1 tablet by mouth daily.  Loratadine (CLARITIN PO) Take 1 tablet by mouth daily.  Cranberry 1000 MG CAPS Take 1 tablet by mouth daily       niacin 250 MG tablet Take 250 mg by mouth daily (with breakfast).  Polyethylene Glycol 3350 (MIRALAX PO) Take by mouth daily        No current facility-administered medications for this visit. No Known Allergies    Hospital Outpatient Visit on 01/17/2020   Component Date Value Ref Range Status    Vit D, 25-Hydroxy 01/17/2020 55.0  >=30 ng/mL Final    Comment: <=20 ng/mL. ........... Terald New Deficient  21-29 ng/mL. ......... Lizzie New Insufficient  >=30 ng/mL. ........ Lizzie New Sufficient      ABO/Rh 01/17/2020 A NEG   Final    Antibody Screen 01/17/2020 NEG   Final    Protime 01/17/2020 11.8  10.0 - 13.2 sec Final    Comment: Effective 11-19-19 09:00am EST  Please note reference ranges have  changed for PT and INR Testing.  INR 01/17/2020 1.02  0.86 - 1.14 Final    Comment: Effective 11/19/19 at 09:00am EST    Normal: 0.86 - 1.14  Therapeutic: 2.0 - 3.0  Pros.  Valve: 2.5 - 3.5  AMI: 2.0 - 3.0      Hemoglobin A1C 01/17/2020 5.5  See comment % Final    Comment: Comment:  Diagnosis of Diabetes: > or = 6.5%  Increased risk of diabetes (Prediabetes): 5.7-6.4%  Glycemic Control: Nonpregnant Adults: <7.0%                    Pregnant: <6.0%        eAG 01/17/2020 111.2  mg/dL Final    Ventricular Rate 01/17/2020 66  BPM Final    Atrial Rate 01/17/2020 66  BPM Final    P-R Interval 01/17/2020 168  ms Final    QRS Duration 01/17/2020 92  ms Final    Q-T Interval 01/17/2020 404  ms Final    QTc Calculation (Bazett) 01/17/2020 423  ms Final    P Axis 01/17/2020 56  degrees Final    R Axis 01/17/2020 43  degrees Final    T Axis 01/17/2020 41  degrees Final    Diagnosis 01/17/2020 Normal sinus rhythmPossible Left atrial enlargementConfirmed by [facility] ([id number]) on 1/18/2020 3:29:12 PM   Final    Sodium 01/17/2020 135* 136 - 145 mmol/L Final    Potassium Final    Lymphocytes Absolute 01/17/2020 1.1  1.0 - 5.1 K/uL Final    Monocytes Absolute 01/17/2020 0.4  0.0 - 1.3 K/uL Final    Eosinophils Absolute 01/17/2020 0.1  0.0 - 0.6 K/uL Final    Basophils Absolute 01/17/2020 0.0  0.0 - 0.2 K/uL Final    aPTT 01/17/2020 33.0  24.2 - 36.2 sec Final    Comment: Therapeutic range: 49.0 - 76.0 sec    Effective 11-19-19 9:00am EST  Please note reference ranges have  changed for PTT Testing.  Color, UA 01/17/2020 YELLOW  Straw/Yellow Final    Clarity, UA 01/17/2020 Clear  Clear Final    Glucose, Ur 01/17/2020 Negative  Negative mg/dL Final    Bilirubin Urine 01/17/2020 Negative  Negative Final    Ketones, Urine 01/17/2020 Negative  Negative mg/dL Final    Specific Gravity, UA 01/17/2020 1.010  1.005 - 1.030 Final    Blood, Urine 01/17/2020 Negative  Negative Final    pH, UA 01/17/2020 8.0  5.0 - 8.0 Final    Protein, UA 01/17/2020 Negative  Negative mg/dL Final    Urobilinogen, Urine 01/17/2020 0.2  <2.0 E.U./dL Final    Nitrite, Urine 01/17/2020 Negative  Negative Final    Leukocyte Esterase, Urine 01/17/2020 Negative  Negative Final    Microscopic Examination 01/17/2020 Not Indicated   Final    Urine Type 01/17/2020 Other   Final    Comment: Urine received in a tube containing preservative. This preservative will not effect the physical  characteristics of the urine.  Urine Culture, Routine 01/17/2020 No growth at 18-36 hours   Final    MRSA Culture Only 01/17/2020    Final                    Value:No Staph aureus MRSA isolated by culture. No Staph aureus MSSA isolated by culture. Review of Systems   Constitutional: Negative for appetite change, chills, fatigue and fever. HENT: Negative for congestion, ear pain, rhinorrhea and sinus pressure. Eyes: Negative for pain, redness and visual disturbance. Respiratory: Negative for apnea, cough, chest tightness, shortness of breath and wheezing.     Cardiovascular: Negative for chest pain, palpitations and leg swelling. Gastrointestinal: Negative for abdominal distention, abdominal pain, blood in stool, constipation, diarrhea, nausea and vomiting. Endocrine: Negative for cold intolerance, heat intolerance, polydipsia, polyphagia and polyuria. Genitourinary: Negative for difficulty urinating, dysuria, enuresis, frequency, hematuria and urgency. Musculoskeletal: Negative for back pain, gait problem, joint swelling and neck pain. Skin: Negative for rash and wound. Allergic/Immunologic: Negative for environmental allergies, food allergies and immunocompromised state. Neurological: Negative for dizziness, syncope, light-headedness, numbness and headaches. Hematological: Negative for adenopathy. Does not bruise/bleed easily. Psychiatric/Behavioral: Negative for agitation, behavioral problems and confusion. The patient is not nervous/anxious. /80   Pulse 78   Ht 5' 8\" (1.727 m)   Wt 148 lb (67.1 kg)   LMP 02/14/1992   BMI 22.50 kg/m²   Physical Exam  Vitals signs reviewed. Constitutional:       General: She is not in acute distress. Appearance: Normal appearance. She is well-developed. She is not ill-appearing or diaphoretic. HENT:      Head: Normocephalic and atraumatic. Right Ear: Tympanic membrane and ear canal normal.      Left Ear: Tympanic membrane and ear canal normal.      Mouth/Throat:      Mouth: Mucous membranes are moist.      Dentition: Does not have dentures. Neck:      Musculoskeletal: Normal range of motion and neck supple. Thyroid: No thyromegaly. Trachea: No tracheal deviation. Cardiovascular:      Rate and Rhythm: Normal rate and regular rhythm. Heart sounds: Normal heart sounds. No murmur. Pulmonary:      Effort: Pulmonary effort is normal. No respiratory distress. Breath sounds: Normal breath sounds. No wheezing. Musculoskeletal:         General: Tenderness present. Right lower leg: No edema.       Left lower leg: No edema. Skin:     General: Skin is warm and dry. Neurological:      General: No focal deficit present. Mental Status: She is alert and oriented to person, place, and time. Deep Tendon Reflexes: Reflexes are normal and symmetric. Psychiatric:         Mood and Affect: Mood normal.         Behavior: Behavior normal.       Diagnosis  Assessment and Plan  1. Preop exam for internal medicine  Perioperative risk related to the patient's upcoming surgery is considered low. She is cleared for surgery. DVT prophylaxis per surgery team.  Pre-op exam was completed on 1/22/20. Reviewed labs and EKG with pt. Already off NSAIDs. 2. Primary osteoarthritis of right knee  Stable, continue with planned surgery    3. Hyperlipidemia, unspecified hyperlipidemia type  Stable, controlled on current regimen.     - lovastatin (MEVACOR) 20 MG tablet; Take 1 tablet by mouth nightly  Dispense: 90 tablet; Refill: 3    4. Essential hypertension, benign  Stable, controlled on current regimen. - lisinopril-hydrochlorothiazide (PRINZIDE;ZESTORETIC) 10-12.5 MG per tablet; Take 1 tablet by mouth daily  Dispense: 90 tablet;  Refill: 3    Follow-up yearly and as needed    Electronically signed by ERIN Ponce CNP on 1/22/2020 at 9:21 AM

## 2020-01-28 ENCOUNTER — HOSPITAL ENCOUNTER (INPATIENT)
Age: 70
LOS: 1 days | Discharge: HOME | DRG: 470 | End: 2020-01-29
Attending: ORTHOPAEDIC SURGERY | Admitting: ORTHOPAEDIC SURGERY
Payer: MEDICARE

## 2020-01-28 ENCOUNTER — APPOINTMENT (OUTPATIENT)
Dept: GENERAL RADIOLOGY | Age: 70
DRG: 470 | End: 2020-01-28
Attending: ORTHOPAEDIC SURGERY
Payer: MEDICARE

## 2020-01-28 ENCOUNTER — ANESTHESIA (OUTPATIENT)
Dept: OPERATING ROOM | Age: 70
DRG: 470 | End: 2020-01-28
Payer: MEDICARE

## 2020-01-28 ENCOUNTER — ANESTHESIA EVENT (OUTPATIENT)
Dept: OPERATING ROOM | Age: 70
DRG: 470 | End: 2020-01-28
Payer: MEDICARE

## 2020-01-28 VITALS
TEMPERATURE: 95.5 F | DIASTOLIC BLOOD PRESSURE: 68 MMHG | RESPIRATION RATE: 1 BRPM | OXYGEN SATURATION: 100 % | SYSTOLIC BLOOD PRESSURE: 142 MMHG

## 2020-01-28 PROBLEM — M17.11 PRIMARY OSTEOARTHRITIS OF RIGHT KNEE: Status: ACTIVE | Noted: 2020-01-28

## 2020-01-28 LAB
ABO/RH: NORMAL
ANTIBODY SCREEN: NORMAL
GLUCOSE BLD-MCNC: 122 MG/DL (ref 70–99)
GLUCOSE BLD-MCNC: 154 MG/DL (ref 70–99)
GLUCOSE BLD-MCNC: 198 MG/DL (ref 70–99)
GLUCOSE BLD-MCNC: 99 MG/DL (ref 70–99)
PERFORMED ON: ABNORMAL
PERFORMED ON: NORMAL

## 2020-01-28 PROCEDURE — 94760 N-INVAS EAR/PLS OXIMETRY 1: CPT

## 2020-01-28 PROCEDURE — 2720000010 HC SURG SUPPLY STERILE: Performed by: ORTHOPAEDIC SURGERY

## 2020-01-28 PROCEDURE — 6370000000 HC RX 637 (ALT 250 FOR IP): Performed by: ORTHOPAEDIC SURGERY

## 2020-01-28 PROCEDURE — 7100000001 HC PACU RECOVERY - ADDTL 15 MIN: Performed by: ORTHOPAEDIC SURGERY

## 2020-01-28 PROCEDURE — 6370000000 HC RX 637 (ALT 250 FOR IP): Performed by: PHYSICIAN ASSISTANT

## 2020-01-28 PROCEDURE — 6360000002 HC RX W HCPCS: Performed by: PHYSICIAN ASSISTANT

## 2020-01-28 PROCEDURE — G0378 HOSPITAL OBSERVATION PER HR: HCPCS

## 2020-01-28 PROCEDURE — 6360000002 HC RX W HCPCS: Performed by: ORTHOPAEDIC SURGERY

## 2020-01-28 PROCEDURE — C1713 ANCHOR/SCREW BN/BN,TIS/BN: HCPCS | Performed by: ORTHOPAEDIC SURGERY

## 2020-01-28 PROCEDURE — 2500000003 HC RX 250 WO HCPCS: Performed by: FAMILY MEDICINE

## 2020-01-28 PROCEDURE — 97161 PT EVAL LOW COMPLEX 20 MIN: CPT

## 2020-01-28 PROCEDURE — 6360000002 HC RX W HCPCS: Performed by: NURSE ANESTHETIST, CERTIFIED REGISTERED

## 2020-01-28 PROCEDURE — 7100000000 HC PACU RECOVERY - FIRST 15 MIN: Performed by: ORTHOPAEDIC SURGERY

## 2020-01-28 PROCEDURE — 3700000000 HC ANESTHESIA ATTENDED CARE: Performed by: ORTHOPAEDIC SURGERY

## 2020-01-28 PROCEDURE — 73560 X-RAY EXAM OF KNEE 1 OR 2: CPT

## 2020-01-28 PROCEDURE — 3700000001 HC ADD 15 MINUTES (ANESTHESIA): Performed by: ORTHOPAEDIC SURGERY

## 2020-01-28 PROCEDURE — 8E0Y0CZ ROBOTIC ASSISTED PROCEDURE OF LOWER EXTREMITY, OPEN APPROACH: ICD-10-PCS | Performed by: ORTHOPAEDIC SURGERY

## 2020-01-28 PROCEDURE — 86850 RBC ANTIBODY SCREEN: CPT

## 2020-01-28 PROCEDURE — 3600000004 HC SURGERY LEVEL 4 BASE: Performed by: ORTHOPAEDIC SURGERY

## 2020-01-28 PROCEDURE — 36415 COLL VENOUS BLD VENIPUNCTURE: CPT

## 2020-01-28 PROCEDURE — 2709999900 HC NON-CHARGEABLE SUPPLY: Performed by: ORTHOPAEDIC SURGERY

## 2020-01-28 PROCEDURE — 86901 BLOOD TYPING SEROLOGIC RH(D): CPT

## 2020-01-28 PROCEDURE — 0SRC069 REPLACEMENT OF RIGHT KNEE JOINT WITH OXIDIZED ZIRCONIUM ON POLYETHYLENE SYNTHETIC SUBSTITUTE, CEMENTED, OPEN APPROACH: ICD-10-PCS | Performed by: ORTHOPAEDIC SURGERY

## 2020-01-28 PROCEDURE — 2500000003 HC RX 250 WO HCPCS: Performed by: NURSE ANESTHETIST, CERTIFIED REGISTERED

## 2020-01-28 PROCEDURE — 2580000003 HC RX 258: Performed by: ORTHOPAEDIC SURGERY

## 2020-01-28 PROCEDURE — 64447 NJX AA&/STRD FEMORAL NRV IMG: CPT | Performed by: FAMILY MEDICINE

## 2020-01-28 PROCEDURE — 99024 POSTOP FOLLOW-UP VISIT: CPT | Performed by: NURSE PRACTITIONER

## 2020-01-28 PROCEDURE — 97116 GAIT TRAINING THERAPY: CPT

## 2020-01-28 PROCEDURE — 1200000000 HC SEMI PRIVATE

## 2020-01-28 PROCEDURE — 3600000014 HC SURGERY LEVEL 4 ADDTL 15MIN: Performed by: ORTHOPAEDIC SURGERY

## 2020-01-28 PROCEDURE — C1776 JOINT DEVICE (IMPLANTABLE): HCPCS | Performed by: ORTHOPAEDIC SURGERY

## 2020-01-28 PROCEDURE — 86900 BLOOD TYPING SEROLOGIC ABO: CPT

## 2020-01-28 PROCEDURE — APPNB30 APP NON BILLABLE TIME 0-30 MINS: Performed by: NURSE PRACTITIONER

## 2020-01-28 PROCEDURE — 2500000003 HC RX 250 WO HCPCS: Performed by: ORTHOPAEDIC SURGERY

## 2020-01-28 PROCEDURE — 97165 OT EVAL LOW COMPLEX 30 MIN: CPT

## 2020-01-28 PROCEDURE — 97530 THERAPEUTIC ACTIVITIES: CPT

## 2020-01-28 PROCEDURE — 97535 SELF CARE MNGMENT TRAINING: CPT

## 2020-01-28 DEVICE — JOURNEY TIBIAL BASEPLATE NONPOROUS                                    RT SZ 6
Type: IMPLANTABLE DEVICE | Site: KNEE | Status: FUNCTIONAL
Brand: JOURNEY

## 2020-01-28 DEVICE — JOURNEY II BCS FEMORAL OXINIUM                                    RIGHT SIZE 7
Type: IMPLANTABLE DEVICE | Site: KNEE | Status: FUNCTIONAL
Brand: JOURNEY

## 2020-01-28 DEVICE — JOURNEY II BCS XLPE ARTICULAR                                    INSERT SIZE 5-6 RIGHT 12MM
Type: IMPLANTABLE DEVICE | Site: KNEE | Status: FUNCTIONAL
Brand: JOURNEY

## 2020-01-28 DEVICE — JOURNEY BCS PATELLA RESURFACING                                    ROUND 29 MM STANDARD
Type: IMPLANTABLE DEVICE | Site: KNEE | Status: FUNCTIONAL
Brand: JOURNEY

## 2020-01-28 DEVICE — RALLY HV ALL IN ONE SYSTEM 70 GRAMS
Type: IMPLANTABLE DEVICE | Site: KNEE | Status: FUNCTIONAL
Brand: RALLY

## 2020-01-28 RX ORDER — PROMETHAZINE HYDROCHLORIDE 25 MG/ML
6.25 INJECTION, SOLUTION INTRAMUSCULAR; INTRAVENOUS PRN
Status: DISCONTINUED | OUTPATIENT
Start: 2020-01-28 | End: 2020-01-28 | Stop reason: HOSPADM

## 2020-01-28 RX ORDER — PROPOFOL 10 MG/ML
INJECTION, EMULSION INTRAVENOUS PRN
Status: DISCONTINUED | OUTPATIENT
Start: 2020-01-28 | End: 2020-01-28 | Stop reason: SDUPTHER

## 2020-01-28 RX ORDER — HYDROMORPHONE HYDROCHLORIDE 1 MG/ML
0.25 INJECTION, SOLUTION INTRAMUSCULAR; INTRAVENOUS; SUBCUTANEOUS
Status: DISCONTINUED | OUTPATIENT
Start: 2020-01-28 | End: 2020-01-29 | Stop reason: HOSPADM

## 2020-01-28 RX ORDER — SODIUM CHLORIDE 0.9 % (FLUSH) 0.9 %
10 SYRINGE (ML) INJECTION EVERY 12 HOURS SCHEDULED
Status: DISCONTINUED | OUTPATIENT
Start: 2020-01-28 | End: 2020-01-29 | Stop reason: HOSPADM

## 2020-01-28 RX ORDER — INSULIN LISPRO 100 [IU]/ML
0-12 INJECTION, SOLUTION INTRAVENOUS; SUBCUTANEOUS
Status: DISCONTINUED | OUTPATIENT
Start: 2020-01-28 | End: 2020-01-29 | Stop reason: HOSPADM

## 2020-01-28 RX ORDER — BUPIVACAINE HYDROCHLORIDE 7.5 MG/ML
INJECTION, SOLUTION INTRASPINAL PRN
Status: DISCONTINUED | OUTPATIENT
Start: 2020-01-28 | End: 2020-01-28 | Stop reason: SDUPTHER

## 2020-01-28 RX ORDER — HYDROCHLOROTHIAZIDE 25 MG/1
12.5 TABLET ORAL DAILY
Status: DISCONTINUED | OUTPATIENT
Start: 2020-01-28 | End: 2020-01-29 | Stop reason: HOSPADM

## 2020-01-28 RX ORDER — PROPOFOL 10 MG/ML
INJECTION, EMULSION INTRAVENOUS CONTINUOUS PRN
Status: DISCONTINUED | OUTPATIENT
Start: 2020-01-28 | End: 2020-01-28 | Stop reason: SDUPTHER

## 2020-01-28 RX ORDER — BUPIVACAINE HYDROCHLORIDE AND EPINEPHRINE 2.5; 5 MG/ML; UG/ML
INJECTION, SOLUTION EPIDURAL; INFILTRATION; INTRACAUDAL; PERINEURAL
Status: COMPLETED | OUTPATIENT
Start: 2020-01-28 | End: 2020-01-28

## 2020-01-28 RX ORDER — OXYCODONE HYDROCHLORIDE 5 MG/1
5 TABLET ORAL PRN
Status: DISCONTINUED | OUTPATIENT
Start: 2020-01-28 | End: 2020-01-28 | Stop reason: HOSPADM

## 2020-01-28 RX ORDER — DEXTROSE MONOHYDRATE 25 G/50ML
12.5 INJECTION, SOLUTION INTRAVENOUS PRN
Status: DISCONTINUED | OUTPATIENT
Start: 2020-01-28 | End: 2020-01-29 | Stop reason: HOSPADM

## 2020-01-28 RX ORDER — ONDANSETRON 2 MG/ML
4 INJECTION INTRAMUSCULAR; INTRAVENOUS EVERY 6 HOURS PRN
Status: DISCONTINUED | OUTPATIENT
Start: 2020-01-28 | End: 2020-01-29 | Stop reason: HOSPADM

## 2020-01-28 RX ORDER — ONDANSETRON 2 MG/ML
INJECTION INTRAMUSCULAR; INTRAVENOUS PRN
Status: DISCONTINUED | OUTPATIENT
Start: 2020-01-28 | End: 2020-01-28 | Stop reason: SDUPTHER

## 2020-01-28 RX ORDER — HYDROMORPHONE HCL 110MG/55ML
0.5 PATIENT CONTROLLED ANALGESIA SYRINGE INTRAVENOUS EVERY 5 MIN PRN
Status: DISCONTINUED | OUTPATIENT
Start: 2020-01-28 | End: 2020-01-28 | Stop reason: HOSPADM

## 2020-01-28 RX ORDER — HYDROMORPHONE HCL 110MG/55ML
0.25 PATIENT CONTROLLED ANALGESIA SYRINGE INTRAVENOUS EVERY 5 MIN PRN
Status: DISCONTINUED | OUTPATIENT
Start: 2020-01-28 | End: 2020-01-28 | Stop reason: HOSPADM

## 2020-01-28 RX ORDER — DEXAMETHASONE SODIUM PHOSPHATE 4 MG/ML
INJECTION, SOLUTION INTRA-ARTICULAR; INTRALESIONAL; INTRAMUSCULAR; INTRAVENOUS; SOFT TISSUE PRN
Status: DISCONTINUED | OUTPATIENT
Start: 2020-01-28 | End: 2020-01-28 | Stop reason: SDUPTHER

## 2020-01-28 RX ORDER — LISINOPRIL AND HYDROCHLOROTHIAZIDE 12.5; 1 MG/1; MG/1
1 TABLET ORAL DAILY
Status: DISCONTINUED | OUTPATIENT
Start: 2020-01-28 | End: 2020-01-28 | Stop reason: RX

## 2020-01-28 RX ORDER — DIPHENHYDRAMINE HYDROCHLORIDE 50 MG/ML
12.5 INJECTION INTRAMUSCULAR; INTRAVENOUS
Status: DISCONTINUED | OUTPATIENT
Start: 2020-01-28 | End: 2020-01-28 | Stop reason: HOSPADM

## 2020-01-28 RX ORDER — PHENYLEPHRINE HYDROCHLORIDE 10 MG/ML
INJECTION INTRAVENOUS PRN
Status: DISCONTINUED | OUTPATIENT
Start: 2020-01-28 | End: 2020-01-28 | Stop reason: SDUPTHER

## 2020-01-28 RX ORDER — LIDOCAINE HYDROCHLORIDE 10 MG/ML
0.5 INJECTION, SOLUTION EPIDURAL; INFILTRATION; INTRACAUDAL; PERINEURAL ONCE
Status: DISCONTINUED | OUTPATIENT
Start: 2020-01-28 | End: 2020-01-28 | Stop reason: HOSPADM

## 2020-01-28 RX ORDER — BUPIVACAINE HYDROCHLORIDE 5 MG/ML
30 INJECTION, SOLUTION EPIDURAL; INTRACAUDAL ONCE
Status: COMPLETED | OUTPATIENT
Start: 2020-01-28 | End: 2020-01-28

## 2020-01-28 RX ORDER — NICOTINE POLACRILEX 4 MG
15 LOZENGE BUCCAL PRN
Status: DISCONTINUED | OUTPATIENT
Start: 2020-01-28 | End: 2020-01-29 | Stop reason: HOSPADM

## 2020-01-28 RX ORDER — LISINOPRIL 10 MG/1
10 TABLET ORAL DAILY
Status: DISCONTINUED | OUTPATIENT
Start: 2020-01-28 | End: 2020-01-29 | Stop reason: HOSPADM

## 2020-01-28 RX ORDER — INSULIN LISPRO 100 [IU]/ML
0-6 INJECTION, SOLUTION INTRAVENOUS; SUBCUTANEOUS NIGHTLY
Status: DISCONTINUED | OUTPATIENT
Start: 2020-01-28 | End: 2020-01-29 | Stop reason: HOSPADM

## 2020-01-28 RX ORDER — FENTANYL CITRATE 50 UG/ML
50 INJECTION, SOLUTION INTRAMUSCULAR; INTRAVENOUS EVERY 5 MIN PRN
Status: DISCONTINUED | OUTPATIENT
Start: 2020-01-28 | End: 2020-01-28 | Stop reason: HOSPADM

## 2020-01-28 RX ORDER — LIDOCAINE HYDROCHLORIDE 20 MG/ML
INJECTION, SOLUTION EPIDURAL; INFILTRATION; INTRACAUDAL; PERINEURAL PRN
Status: DISCONTINUED | OUTPATIENT
Start: 2020-01-28 | End: 2020-01-28 | Stop reason: SDUPTHER

## 2020-01-28 RX ORDER — HYDROMORPHONE HYDROCHLORIDE 1 MG/ML
0.5 INJECTION, SOLUTION INTRAMUSCULAR; INTRAVENOUS; SUBCUTANEOUS
Status: DISCONTINUED | OUTPATIENT
Start: 2020-01-28 | End: 2020-01-29 | Stop reason: HOSPADM

## 2020-01-28 RX ORDER — OXYCODONE HYDROCHLORIDE 5 MG/1
10 TABLET ORAL PRN
Status: DISCONTINUED | OUTPATIENT
Start: 2020-01-28 | End: 2020-01-28 | Stop reason: HOSPADM

## 2020-01-28 RX ORDER — SODIUM CHLORIDE 0.9 % (FLUSH) 0.9 %
10 SYRINGE (ML) INJECTION PRN
Status: DISCONTINUED | OUTPATIENT
Start: 2020-01-28 | End: 2020-01-29 | Stop reason: HOSPADM

## 2020-01-28 RX ORDER — DOCUSATE SODIUM 100 MG/1
100 CAPSULE, LIQUID FILLED ORAL 2 TIMES DAILY
Status: DISCONTINUED | OUTPATIENT
Start: 2020-01-28 | End: 2020-01-29 | Stop reason: HOSPADM

## 2020-01-28 RX ORDER — SIMVASTATIN 20 MG
10 TABLET ORAL NIGHTLY
Status: DISCONTINUED | OUTPATIENT
Start: 2020-01-28 | End: 2020-01-28

## 2020-01-28 RX ORDER — ATORVASTATIN CALCIUM 10 MG/1
5 TABLET, FILM COATED ORAL NIGHTLY
Status: DISCONTINUED | OUTPATIENT
Start: 2020-01-28 | End: 2020-01-29 | Stop reason: HOSPADM

## 2020-01-28 RX ORDER — OXYCODONE HYDROCHLORIDE 5 MG/1
5 TABLET ORAL EVERY 4 HOURS PRN
Status: DISCONTINUED | OUTPATIENT
Start: 2020-01-28 | End: 2020-01-29 | Stop reason: HOSPADM

## 2020-01-28 RX ORDER — FENTANYL CITRATE 50 UG/ML
INJECTION, SOLUTION INTRAMUSCULAR; INTRAVENOUS PRN
Status: DISCONTINUED | OUTPATIENT
Start: 2020-01-28 | End: 2020-01-28 | Stop reason: SDUPTHER

## 2020-01-28 RX ORDER — ACETAMINOPHEN 325 MG/1
650 TABLET ORAL EVERY 6 HOURS
Status: DISCONTINUED | OUTPATIENT
Start: 2020-01-28 | End: 2020-01-29 | Stop reason: HOSPADM

## 2020-01-28 RX ORDER — MEPERIDINE HYDROCHLORIDE 25 MG/ML
12.5 INJECTION INTRAMUSCULAR; INTRAVENOUS; SUBCUTANEOUS EVERY 5 MIN PRN
Status: DISCONTINUED | OUTPATIENT
Start: 2020-01-28 | End: 2020-01-28 | Stop reason: HOSPADM

## 2020-01-28 RX ORDER — SENNA AND DOCUSATE SODIUM 50; 8.6 MG/1; MG/1
1 TABLET, FILM COATED ORAL 2 TIMES DAILY
Status: DISCONTINUED | OUTPATIENT
Start: 2020-01-28 | End: 2020-01-29 | Stop reason: HOSPADM

## 2020-01-28 RX ORDER — SODIUM CHLORIDE, SODIUM LACTATE, POTASSIUM CHLORIDE, CALCIUM CHLORIDE 600; 310; 30; 20 MG/100ML; MG/100ML; MG/100ML; MG/100ML
INJECTION, SOLUTION INTRAVENOUS CONTINUOUS
Status: DISCONTINUED | OUTPATIENT
Start: 2020-01-28 | End: 2020-01-28

## 2020-01-28 RX ORDER — NIACIN 250 MG
250 TABLET, EXTENDED RELEASE ORAL
Status: DISCONTINUED | OUTPATIENT
Start: 2020-01-29 | End: 2020-01-29 | Stop reason: HOSPADM

## 2020-01-28 RX ORDER — CELECOXIB 200 MG/1
400 CAPSULE ORAL ONCE
Status: COMPLETED | OUTPATIENT
Start: 2020-01-28 | End: 2020-01-28

## 2020-01-28 RX ORDER — WATER FOR INJ.,BACTERIOSTATIC
VIAL (ML) INJECTION
Status: COMPLETED | OUTPATIENT
Start: 2020-01-28 | End: 2020-01-28

## 2020-01-28 RX ORDER — SODIUM CHLORIDE 9 MG/ML
INJECTION, SOLUTION INTRAVENOUS CONTINUOUS
Status: DISCONTINUED | OUTPATIENT
Start: 2020-01-28 | End: 2020-01-29

## 2020-01-28 RX ORDER — LABETALOL HYDROCHLORIDE 5 MG/ML
5 INJECTION, SOLUTION INTRAVENOUS EVERY 10 MIN PRN
Status: DISCONTINUED | OUTPATIENT
Start: 2020-01-28 | End: 2020-01-28 | Stop reason: HOSPADM

## 2020-01-28 RX ORDER — GLYCOPYRROLATE 0.2 MG/ML
INJECTION INTRAMUSCULAR; INTRAVENOUS PRN
Status: DISCONTINUED | OUTPATIENT
Start: 2020-01-28 | End: 2020-01-28 | Stop reason: SDUPTHER

## 2020-01-28 RX ORDER — OXYCODONE HYDROCHLORIDE 5 MG/1
10 TABLET ORAL EVERY 4 HOURS PRN
Status: DISCONTINUED | OUTPATIENT
Start: 2020-01-28 | End: 2020-01-29 | Stop reason: HOSPADM

## 2020-01-28 RX ORDER — DEXTROSE MONOHYDRATE 50 MG/ML
100 INJECTION, SOLUTION INTRAVENOUS PRN
Status: DISCONTINUED | OUTPATIENT
Start: 2020-01-28 | End: 2020-01-29 | Stop reason: HOSPADM

## 2020-01-28 RX ADMIN — SODIUM CHLORIDE, POTASSIUM CHLORIDE, SODIUM LACTATE AND CALCIUM CHLORIDE: 600; 310; 30; 20 INJECTION, SOLUTION INTRAVENOUS at 06:12

## 2020-01-28 RX ADMIN — ATORVASTATIN CALCIUM 5 MG: 10 TABLET, FILM COATED ORAL at 20:40

## 2020-01-28 RX ADMIN — PROPOFOL 20 MG: 10 INJECTION, EMULSION INTRAVENOUS at 07:15

## 2020-01-28 RX ADMIN — PHENYLEPHRINE HYDROCHLORIDE 100 MCG: 10 INJECTION INTRAVENOUS at 07:09

## 2020-01-28 RX ADMIN — BUPIVACAINE HYDROCHLORIDE 2 ML: 7.5 INJECTION, SOLUTION INTRASPINAL at 07:07

## 2020-01-28 RX ADMIN — GLYCOPYRROLATE 0.2 MG: 0.2 INJECTION INTRAMUSCULAR; INTRAVENOUS at 07:32

## 2020-01-28 RX ADMIN — PHENYLEPHRINE HYDROCHLORIDE 100 MCG: 10 INJECTION INTRAVENOUS at 07:13

## 2020-01-28 RX ADMIN — PHENYLEPHRINE HYDROCHLORIDE 100 MCG: 10 INJECTION INTRAVENOUS at 07:43

## 2020-01-28 RX ADMIN — PHENYLEPHRINE HYDROCHLORIDE 100 MCG: 10 INJECTION INTRAVENOUS at 07:57

## 2020-01-28 RX ADMIN — OXYCODONE 10 MG: 5 TABLET ORAL at 20:40

## 2020-01-28 RX ADMIN — ONDANSETRON 4 MG: 2 INJECTION INTRAMUSCULAR; INTRAVENOUS at 07:15

## 2020-01-28 RX ADMIN — PHENYLEPHRINE HYDROCHLORIDE 100 MCG: 10 INJECTION INTRAVENOUS at 07:24

## 2020-01-28 RX ADMIN — FENTANYL CITRATE 25 MCG: 50 INJECTION, SOLUTION INTRAMUSCULAR; INTRAVENOUS at 07:14

## 2020-01-28 RX ADMIN — LISINOPRIL 10 MG: 10 TABLET ORAL at 16:22

## 2020-01-28 RX ADMIN — FENTANYL CITRATE 50 MCG: 50 INJECTION, SOLUTION INTRAMUSCULAR; INTRAVENOUS at 07:20

## 2020-01-28 RX ADMIN — PHENYLEPHRINE HYDROCHLORIDE 100 MCG: 10 INJECTION INTRAVENOUS at 07:31

## 2020-01-28 RX ADMIN — PHENYLEPHRINE HYDROCHLORIDE 100 MCG: 10 INJECTION INTRAVENOUS at 08:12

## 2020-01-28 RX ADMIN — TRANEXAMIC ACID 1 G: 1 INJECTION, SOLUTION INTRAVENOUS at 08:16

## 2020-01-28 RX ADMIN — PROPOFOL 20 MG: 10 INJECTION, EMULSION INTRAVENOUS at 07:25

## 2020-01-28 RX ADMIN — CEFAZOLIN 2 G: 1 INJECTION, POWDER, FOR SOLUTION INTRAMUSCULAR; INTRAVENOUS at 07:05

## 2020-01-28 RX ADMIN — PROPOFOL 30 MG: 10 INJECTION, EMULSION INTRAVENOUS at 08:01

## 2020-01-28 RX ADMIN — TRANEXAMIC ACID 1000 MG: 1 INJECTION, SOLUTION INTRAVENOUS at 06:50

## 2020-01-28 RX ADMIN — PHENYLEPHRINE HYDROCHLORIDE 100 MCG: 10 INJECTION INTRAVENOUS at 08:17

## 2020-01-28 RX ADMIN — BUPIVACAINE HYDROCHLORIDE 150 MG: 5 INJECTION, SOLUTION EPIDURAL; INTRACAUDAL at 06:40

## 2020-01-28 RX ADMIN — HYDROCHLOROTHIAZIDE 12.5 MG: 25 TABLET ORAL at 16:23

## 2020-01-28 RX ADMIN — PROPOFOL 10 MG: 10 INJECTION, EMULSION INTRAVENOUS at 08:17

## 2020-01-28 RX ADMIN — PHENYLEPHRINE HYDROCHLORIDE 100 MCG: 10 INJECTION INTRAVENOUS at 08:24

## 2020-01-28 RX ADMIN — CEFAZOLIN SODIUM 2 G: 2 INJECTION, SOLUTION INTRAVENOUS at 16:22

## 2020-01-28 RX ADMIN — SENNOSIDES AND DOCUSATE SODIUM 1 TABLET: 8.6; 5 TABLET ORAL at 20:40

## 2020-01-28 RX ADMIN — PROPOFOL 10 MG: 10 INJECTION, EMULSION INTRAVENOUS at 08:30

## 2020-01-28 RX ADMIN — GLYCOPYRROLATE 0.2 MG: 0.2 INJECTION INTRAMUSCULAR; INTRAVENOUS at 07:37

## 2020-01-28 RX ADMIN — PROPOFOL 10 MG: 10 INJECTION, EMULSION INTRAVENOUS at 08:12

## 2020-01-28 RX ADMIN — PROPOFOL 75 MCG/KG/MIN: 10 INJECTION, EMULSION INTRAVENOUS at 07:09

## 2020-01-28 RX ADMIN — ACETAMINOPHEN 650 MG: 325 TABLET, FILM COATED ORAL at 20:39

## 2020-01-28 RX ADMIN — PROPOFOL 40 MG: 10 INJECTION, EMULSION INTRAVENOUS at 07:12

## 2020-01-28 RX ADMIN — PHENYLEPHRINE HYDROCHLORIDE 100 MCG: 10 INJECTION INTRAVENOUS at 07:52

## 2020-01-28 RX ADMIN — PROPOFOL 10 MG: 10 INJECTION, EMULSION INTRAVENOUS at 08:24

## 2020-01-28 RX ADMIN — FENTANYL CITRATE 25 MCG: 50 INJECTION, SOLUTION INTRAMUSCULAR; INTRAVENOUS at 07:07

## 2020-01-28 RX ADMIN — INSULIN LISPRO 1 UNITS: 100 INJECTION, SOLUTION INTRAVENOUS; SUBCUTANEOUS at 20:40

## 2020-01-28 RX ADMIN — DEXAMETHASONE SODIUM PHOSPHATE 10 MG: 4 INJECTION, SOLUTION INTRAMUSCULAR; INTRAVENOUS at 07:12

## 2020-01-28 RX ADMIN — LIDOCAINE HYDROCHLORIDE 100 MG: 20 INJECTION, SOLUTION EPIDURAL; INFILTRATION; INTRACAUDAL; PERINEURAL at 07:09

## 2020-01-28 RX ADMIN — PROPOFOL 40 MG: 10 INJECTION, EMULSION INTRAVENOUS at 07:09

## 2020-01-28 RX ADMIN — OXYCODONE 10 MG: 5 TABLET ORAL at 16:25

## 2020-01-28 RX ADMIN — DOCUSATE SODIUM 100 MG: 100 CAPSULE, LIQUID FILLED ORAL at 20:40

## 2020-01-28 RX ADMIN — PHENYLEPHRINE HYDROCHLORIDE 100 MCG: 10 INJECTION INTRAVENOUS at 08:31

## 2020-01-28 RX ADMIN — CELECOXIB 400 MG: 200 CAPSULE ORAL at 06:12

## 2020-01-28 RX ADMIN — ACETAMINOPHEN 650 MG: 325 TABLET, FILM COATED ORAL at 16:22

## 2020-01-28 RX ADMIN — PHENYLEPHRINE HYDROCHLORIDE 100 MCG: 10 INJECTION INTRAVENOUS at 08:35

## 2020-01-28 ASSESSMENT — PULMONARY FUNCTION TESTS
PIF_VALUE: 0
PIF_VALUE: 1
PIF_VALUE: 0
PIF_VALUE: 1
PIF_VALUE: 0
PIF_VALUE: 1
PIF_VALUE: 0
PIF_VALUE: 1
PIF_VALUE: 0
PIF_VALUE: 1
PIF_VALUE: 0
PIF_VALUE: 2
PIF_VALUE: 0
PIF_VALUE: 1
PIF_VALUE: 0
PIF_VALUE: 1
PIF_VALUE: 0

## 2020-01-28 ASSESSMENT — PAIN SCALES - GENERAL
PAINLEVEL_OUTOF10: 0
PAINLEVEL_OUTOF10: 0
PAINLEVEL_OUTOF10: 7
PAINLEVEL_OUTOF10: 0
PAINLEVEL_OUTOF10: 7
PAINLEVEL_OUTOF10: 7

## 2020-01-28 ASSESSMENT — PAIN DESCRIPTION - PAIN TYPE
TYPE: SURGICAL PAIN
TYPE: SURGICAL PAIN

## 2020-01-28 ASSESSMENT — PAIN DESCRIPTION - LOCATION
LOCATION: KNEE
LOCATION: KNEE

## 2020-01-28 ASSESSMENT — PAIN - FUNCTIONAL ASSESSMENT: PAIN_FUNCTIONAL_ASSESSMENT: 0-10

## 2020-01-28 NOTE — ANESTHESIA PRE PROCEDURE
Laterality Date    MIDDLE EAR SURGERY      Done 2 times.  VARICOSE VEIN SURGERY         Social History:    Social History     Tobacco Use    Smoking status: Never Smoker    Smokeless tobacco: Never Used   Substance Use Topics    Alcohol use: Yes     Alcohol/week: 14.0 standard drinks     Types: 14 Glasses of wine per week     Comment: Couple of drinks nightly                                 Counseling given: Not Answered      Vital Signs (Current):   Vitals:    12/20/19 1538 01/28/20 0552   BP:  137/86   Pulse:  76   Resp:  16   Temp:  97.9 °F (36.6 °C)   TempSrc:  Temporal   SpO2:  97%   Weight: 145 lb (65.8 kg) 146 lb 12.8 oz (66.6 kg)   Height: 5' 8\" (1.727 m) 5' 8\" (1.727 m)                                              BP Readings from Last 3 Encounters:   01/28/20 137/86   01/22/20 126/80   11/04/19 (!) 147/81       NPO Status: Time of last liquid consumption: 2300                        Time of last solid consumption: 1900                        Date of last liquid consumption: 01/27/20                        Date of last solid food consumption: 01/27/20    BMI:   Wt Readings from Last 3 Encounters:   01/28/20 146 lb 12.8 oz (66.6 kg)   01/22/20 148 lb (67.1 kg)   11/04/19 146 lb 9.6 oz (66.5 kg)     Body mass index is 22.32 kg/m².     CBC:   Lab Results   Component Value Date    WBC 4.6 01/17/2020    RBC 4.29 01/17/2020    HGB 13.6 01/17/2020    HCT 40.6 01/17/2020    MCV 94.6 01/17/2020    RDW 12.8 01/17/2020     01/17/2020       CMP:   Lab Results   Component Value Date     01/17/2020    K 4.6 01/17/2020    CL 94 01/17/2020    CO2 26 01/17/2020    BUN 13 01/17/2020    CREATININE 0.5 01/17/2020    GFRAA >60 01/17/2020    GFRAA >60 02/19/2013    AGRATIO 1.9 01/17/2020    LABGLOM >60 01/17/2020    GLUCOSE 109 01/17/2020    PROT 7.5 01/17/2020    PROT 7.3 02/19/2013    CALCIUM 10.1 01/17/2020    BILITOT 0.4 01/17/2020    ALKPHOS 41 01/17/2020    AST 26 01/17/2020    ALT 19 01/17/2020 POC Tests:   Recent Labs     01/28/20  0609   POCGLU 99       Coags:   Lab Results   Component Value Date    PROTIME 11.8 01/17/2020    INR 1.02 01/17/2020    APTT 33.0 01/17/2020       HCG (If Applicable): No results found for: PREGTESTUR, PREGSERUM, HCG, HCGQUANT     ABGs: No results found for: PHART, PO2ART, GEF3KXC, IVP3WWN, BEART, [id number]     Type & Screen (If Applicable):  No results found for: LABABO, LABRH    Anesthesia Evaluation    Airway: Mallampati: II  TM distance: >3 FB   Neck ROM: full  Mouth opening: > = 3 FB Dental:          Pulmonary:                              Cardiovascular:    (+) hypertension:,         Rhythm: regular  Rate: normal                    Neuro/Psych:               GI/Hepatic/Renal:             Endo/Other:                     Abdominal:           Vascular:                                        Anesthesia Plan      general, MAC, regional and spinal     ASA 2       Induction: intravenous. Anesthetic plan and risks discussed with patient. Plan discussed with CRNA.                   Salty Cunningham MD   1/28/2020

## 2020-01-28 NOTE — DISCHARGE SUMMARY
Patient ID:  Benny Awad  [id number]  1950    Admit date: 1/28/2020    Discharge date: 1/29/2020  4:46 PM    Attending Physician: Alejandra Palacio MD     Admission Diagnoses:   Primary osteoarthritis of right knee [M17.11]    Discharge Diagnoses: Active Problems:    1/28/20 RIGHT TKA  Resolved Problems:    * No resolved hospital problems. *    Past Medical History:   Diagnosis Date    Arthritis     knee    Hyperlipidemia     Hypertension      Indication for Admission: Benny Awad is a 77 y.o. female who presented with a history of RIGHT knee pain unresponsive to conservative treatment. PMH includes HTN, HLD. Operations/Procedures Performed:   1. RIGHT total knee arthroplasty    Hospital Course: Patient admitted on 1/28/2020 and underwent abovementioned procedure(s) on 1/28/20. Tolerated the procedure well with no complications. Please see full operative report for further details regarding the operation. Postoperatively transferred to 1/28/20 in stable condition. Pain controlled post-op with IV/oral pain medication. Diet was advanced and tolerated this well. At time of discharge, the patient was tolerating oral food and hydration, voiding spontaneously, had return of bowel function, was ambulating without difficulty, and pain was controlled on oral medications. The patient was determined to be suitable for discharge and the patient felt comfortable with that decision.      Consults: Internal Medicine, Physical and Occupational Therapy, Social Work    Significant Diagnostic Studies:   1/28/20 RIGHT knee x-ray:  Perioperative examination of the right knee has been obtained in this patient   status post right knee arthroplasty.  As visualized, orthopedic hardware is   in good position     Discharge Exam:  BP (!) 145/71   Pulse 63   Temp 98 °F (36.7 °C) (Oral)   Resp 16   Ht 5' 8\" (1.727 m)   Wt 146 lb 12.8 oz (66.6 kg)   LMP 02/14/1992   SpO2 98%   BMI 22.32 kg/m²     Gen - NAD, AOx3   - voiding spontaneously  Ext - RIGHT knee overdressing c/d/i  RIGHT LE NVI  Discharge condition:  Stable    Discharge Medications:  ALL MEDICATIONS HAVE BEEN REVIEWED:  Discharge Medication List as of 1/29/2020  4:23 PM      START taking these medications    Details   oxyCODONE-acetaminophen (PERCOCET) 5-325 MG per tablet Take 1-2 tablets by mouth every 4 hours as needed for Pain for up to 14 days. , Disp-42 tablet, R-0Print      aspirin 325 MG EC tablet Take 1 tablet by mouth 2 times daily for 14 days, Disp-28 tablet, R-0Print         CONTINUE these medications which have NOT CHANGED    Details   lovastatin (MEVACOR) 20 MG tablet Take 1 tablet by mouth nightly, Disp-90 tablet, R-3Normal      lisinopril-hydrochlorothiazide (PRINZIDE;ZESTORETIC) 10-12.5 MG per tablet Take 1 tablet by mouth daily, Disp-90 tablet, R-3Normal      Magnesium 500 MG TABS Take 1 tablet by mouth dailyHistorical Med      vitamin B-12 (CYANOCOBALAMIN) 500 MCG tablet Take 500 mcg by mouth dailyHistorical Med      Calcium Carbonate-Vitamin D (CALCIUM + D PO) Take 1 tablet by mouth daily Historical Med      Glucosamine-Chondroit-Vit C-Mn (GLUCOSAMINE CHONDR 1500 COMPLX) CAPS Take 1 tablet by mouth daily Historical Med      Multiple Vitamins-Minerals (ONE-A-DAY WOMENS 50 PLUS PO) Take 1 tablet by mouth daily. Loratadine (CLARITIN PO) Take 1 tablet by mouth daily. Cranberry 1000 MG CAPS Take 1 tablet by mouth daily Historical Med      niacin 250 MG tablet Take 250 mg by mouth daily (with breakfast). Polyethylene Glycol 3350 (MIRALAX PO) Take by mouth daily Historical Med         STOP taking these medications       ibuprofen (ADVIL;MOTRIN) 200 MG tablet Comments:   Reason for Stopping:           Due to patient's orthopaedic surgical procedure/condition, patient may require pain medication for up to 6-8 weeks. Disposition: home with home PT    Patient Instructions:    Activity: activity as tolerated  Wound Care: Remove

## 2020-01-28 NOTE — CONSULTS
4.29 01/17/2020    HGB 13.6 01/17/2020    HCT 40.6 01/17/2020    MCV 94.6 01/17/2020    MCH 31.6 01/17/2020    MCHC 33.4 01/17/2020    RDW 12.8 01/17/2020     01/17/2020    MPV 9.5 01/17/2020     BMP:    Lab Results   Component Value Date     01/17/2020    K 4.6 01/17/2020    CL 94 01/17/2020    CO2 26 01/17/2020    BUN 13 01/17/2020    CREATININE 0.5 01/17/2020    CALCIUM 10.1 01/17/2020    GFRAA >60 01/17/2020    GFRAA >60 02/19/2013    LABGLOM >60 01/17/2020    GLUCOSE 109 01/17/2020     XR KNEE RIGHT (1-2 VIEWS)   Preliminary Result   Status post right knee arthroplasty. Recent imaging reviewed    Problem List  Active Problems:    1/28/20 RIGHT TKA  Resolved Problems:    * No resolved hospital problems. *        Assessment/Plan:   Hyperglycemia: likely stress induced from surgery a1c of 5.5  - SSI as needed will monitor    HTN: home meds  HLD: home meds    DVT ppx and pain management per surgery    Please note that some part of this chart was generated using Dragon dictation software. Although every effort was made to ensure the accuracy of this automated transcription, some errors in transcription may have occurred inadvertently. If you may need any clarification, please do not hesitate to contact me through [facility].        Konnie Hatchet, MD    1/28/2020 5:28 PM

## 2020-01-28 NOTE — CARE COORDINATION
Discharge Planning Assessment    SW met with pt to discuss reason for admission, current living situation, and potential needs at the time of discharge    Demographics/Insurance verified Yes/yes    Current type of dwelling:States she lives in a [place] [address] home. Patient from ECF/SW confirmed with: n/a    Living arrangements: lives alone    Level of function/Support: family lives close by and is very supportive    PCP:Jacob Orantes MD    Last Visit to PCP: 1/22/20    DME: borrowed walker from friend     Active with any community resources/agencies/skilled home care: no    Medication compliance issues: no    Financial issues that could impact healthcare: no        Tentative discharge plan:  Discussed and provided facilities of choice if transition to a skilled nursing facility is required at the time of discharge    Discussed with patient and/or family that on the day of discharge home tentative time of discharge will be between 10 AM and noon.     Transportation at the time of discharge: yes    Brigitte THACKER, [address]

## 2020-01-28 NOTE — PROGRESS NOTES
medical history of Arthritis, Hyperlipidemia, and Hypertension. has a past surgical history that includes Middle ear surgery and Varicose vein surgery. Restrictions  Restrictions/Precautions  Restrictions/Precautions: Weight Bearing, Fall Risk(low fall risk)  Required Braces or Orthoses?: No  Lower Extremity Weight Bearing Restrictions  Right Lower Extremity Weight Bearing: Weight Bearing As Tolerated  Position Activity Restriction  Other position/activity restrictions: S/P R TKA 1/28/2020     Vision/Hearing  Vision: Impaired  Vision Exceptions: Wears glasses for reading  Hearing: Within functional limits       Subjective  General  Chart Reviewed: Yes(low fall risk)  Additional Pertinent Hx: Pt had a R TKR on 1/28/20. Past history of 2 middle ear surgeries. Family / Caregiver Present: (daughter and son)  Referring Practitioner: Sabrina Franks  General Comment  Comments: Pt agreeable to therapy session.    Subjective  Subjective: Pt supine in bed upon arrival   Pain Screening  Patient Currently in Pain: Denies  Vital Signs  Patient Currently in Pain: Denies     Orientation  Orientation  Overall Orientation Status: Within Functional Limits     Social/Functional History  Social/Functional History  Lives With: Alone  Type of Home: House  Home Layout: Multi-level(6-7 stairs with one rail)  Home Access: Stairs to enter without rails  Entrance Stairs - Number of Steps: 1 stair  Bathroom Shower/Tub: Walk-in shower  Bathroom Toilet: Handicap height(has over toilet commode with handrails)  Bathroom Equipment: Shower chair, Hand-held shower(bar on the shower door, shower chair available if needed)  Bathroom Accessibility: Davion Arguelles: Rolling walker  ADL Assistance: Independent  Homemaking Assistance: Independent  Homemaking Responsibilities: Yes  Ambulation Assistance: Independent  Transfer Assistance: Independent  Active : Yes  Mode of Transportation: SUV  Occupation: Retired  Type of occupation: accounting  Leisure & Hobbies: reading, walking, gardening  Additional Comments: no falls in last 6 months      Cognition   Cognition  Overall Cognitive Status: WFL    Objective  AROM RLE (degrees)  RLE AROM: Exceptions  RLE General AROM: knee: 0-15-73 degrees flexion  AROM LLE (degrees)  LLE AROM : WFL  Strength RLE  Strength RLE: Exception  Comment: grossly 3+/5  Strength LLE  Strength LLE: WFL  Tone RLE  RLE Tone: Normotonic  Tone LLE  LLE Tone: Normotonic  Motor Control  Gross Motor?: WFL  Sensation  Overall Sensation Status: Impaired(pt reported numbness in buttocks due to anesthetic)  Bed mobility  Rolling to Right: Supervision  Supine to Sit: Supervision  Scooting: Supervision  Transfers  Sit to Stand: Contact guard assistance(from bed and commode with RW)  Stand to sit: Contact guard assistance(to commode and recliner with RW)  Ambulation  Ambulation?: Yes  WB Status: WBAT  More Ambulation?: Yes  Ambulation 1  Surface: level tile  Device: Rolling Walker  Assistance: Contact guard assistance  Quality of Gait: Reciprocal gait pattern. Initially ambulated without bending R knee, self corrected following VCs from PT   Gait Deviations: Slow Melissa;Decreased step length;Decreased step height  Distance: 40'  Comments: Pt required VCs for education on RW management and proper sequencing.    Stairs/Curb  Stairs?: No     Balance  Posture: Good  Sitting - Static: Good  Sitting - Dynamic: Good(able to don underwear, complete pericare and wash hands with supervision)  Standing - Static: Fair;+(with RW)  Standing - Dynamic: Fair;+(with RW)      Plan   Plan  Times per week: 7x  Times per day: Twice a day  Current Treatment Recommendations: Strengthening, Transfer Training, Endurance Training, ROM, Balance Training, Gait Training, Home Exercise Program, Functional Mobility Training, Stair training, Neuromuscular Re-education, Safety Education & Training, Patient/Caregiver Education & Training, Equipment Evaluation,

## 2020-01-28 NOTE — PROGRESS NOTES
[facility] Orthopedic Surgery   Progress Note    CHIEF COMPLAINT/DIAGNOSIS:  1/28/20 RIGHT TKA    SUBJECTIVE: Patient sitting up in bed eating; describes no knee pain. States she lives in a [place] [address] home. OBJECTIVE  Physical    VITALS:  /87   Pulse 78   Temp 97.2 °F (36.2 °C) (Temporal)   Resp 16   Ht 5' 8\" (1.727 m)   Wt 146 lb 12.8 oz (66.6 kg)   LMP 02/14/1992   SpO2 97%   BMI 22.32 kg/m²     GENERAL: Alert, NAD   MUSCULOSKELETAL: RIGHT LE  INCISION:  Dressing/Ace wrap c/d/i; cooling pad in place  ROM: intact DF/PF  Sensory:  Intact to light touch in peroneal and tibial distributions  Vascular:   Intact dors ped pulse;  calf soft and nontender    Data    ALL MEDICATIONS HAVE BEEN REVIEWED    CBC: No results for input(s): WBC, HGB, HCT, PLT in the last 72 hours. BMP: No results for input(s): NA, K, CL, CO2, PHOS, BUN, CREATININE in the last 72 hours. Invalid input(s): CA  INR: No results for input(s): INR in the last 72 hours. ASSESSMENT:  1/28/20 RIGHT TKA, POD#0  HTN    PLAN:   - WB status:  WBAT   - DVT prophylaxis: ASA BID  - PT/OT  - D/C Plan: awaiting PT recs; likely home tomorrow with home PT  - Pain Control: current regimen. Due to orthopaedic surgical procedure/condition, patient may require pain medication for up to 6-8 weeks.   - F U with Orthopaedics 2/12/20 at 2:00 PM.     ERIN TAMEZ-CNP  1/28/2020  1:28 PM    .

## 2020-01-28 NOTE — OP NOTE
retraction and closure. Electronically signed by:  Duran Carey, [address] [place] [address] and Sports Medicine  01/28/20  8:50 AM

## 2020-01-28 NOTE — PROGRESS NOTES
Patient remains comfortable. VSS. Post op xray completed. BS - 122. Surgical dressing c/d/i. Phase 1 criteria met, holding for a room.

## 2020-01-28 NOTE — ANESTHESIA PROCEDURE NOTES
Spinal Block    Patient location during procedure: OR  Start time: 1/28/2020 7:00 AM  End time: 1/28/2020 7:07 AM  Reason for block: primary anesthetic  Staffing  Anesthesiologist: Jason Ochoa MD  Performed: anesthesiologist   Spinal Block  Patient position: sitting  Prep: Betadine  Patient monitoring: cardiac monitor, frequent blood pressure checks and continuous pulse ox  Approach: midline  Provider prep: mask and sterile gloves  Local infiltration: lidocaine  Needle  Needle type: Pencan   Needle gauge: 27 G  Needle length: 4 in  Assessment  Swirl obtained: Yes  CSF: clear  Attempts: 1  Additional Notes  Spinal placed easily, sterile technique, no complications.

## 2020-01-28 NOTE — PROGRESS NOTES
Patient to PACU from OR. She is alert and oriented. Had a spinal anesthetic, is able to wiggle her toes. +2 bilat pedal pulses. Surgical dressing c/d/i. VSS. Will monitor.

## 2020-01-28 NOTE — PROGRESS NOTES
Occupational Therapy   Occupational Therapy Initial Assessment  Date: 2020   Patient Name: Parminder De La Cruz  MRN: [id number]     : 1950    Date of Service: 2020    Discharge Recommendations: Parminder De La Cruz scored a 21/24 on the AM-PAC ADL Inpatient form. Current research shows that an AM-PAC score of 18 or greater is typically associated with a discharge to the patient's home setting. Based on the patients AM-PAC score and their current ADL deficits, it is recommended that the patient have 2-3 sessions per week of Occupational Therapy at d/c to increase the patients independence. Anticipate no further OT upon discharge     OT Equipment Recommendations  Equipment Needed: No    Assessment   Performance deficits / Impairments: Decreased functional mobility ; Decreased ADL status; Decreased high-level IADLs  Treatment Diagnosis: Decreased ADLs, IADLs, transfers associated with R TKA  Prognosis: Good  Decision Making: Low Complexity  OT Education: OT Role;Plan of Care;ADL Adaptive Strategies;Transfer Training;IADL Safety  REQUIRES OT FOLLOW UP: Yes  Activity Tolerance  Activity Tolerance: Patient Tolerated treatment well  Safety Devices  Safety Devices in place: Yes  Type of devices: All fall risk precautions in place;Call light within reach; Chair alarm in place; Patient at risk for falls;Nurse notified; Left in chair           Patient Diagnosis(es): The encounter diagnosis was Preop testing. has a past medical history of Arthritis, Hyperlipidemia, and Hypertension. has a past surgical history that includes Middle ear surgery and Varicose vein surgery.     Treatment Diagnosis: Decreased ADLs, IADLs, transfers associated with R TKA      Restrictions  Restrictions/Precautions  Restrictions/Precautions: Weight Bearing, Fall Risk(low fall risk)  Required Braces or Orthoses?: No  Lower Extremity Weight Bearing Restrictions  Right Lower Extremity Weight Bearing: Weight Bearing As Tolerated    Subjective assistance  Wheelchair Bed Transfers  Wheelchair/Bed - Technique: Ambulating  Equipment Used: Other  Level of Asssistance: Contact guard assistance  ADL  Feeding: Independent  Grooming: Contact guard assistance(In stance at sink for hand hygiene)  UE Dressing: Minimal assistance(to assist with threading 2/2 presence of IV)  Toileting: Contact guard assistance(attempted urination on standard toilet, use of L grab bar)  Tone RUE  RUE Tone: Normotonic  Tone LUE  LUE Tone: Normotonic  Coordination  Movements Are Fluid And Coordinated: Yes     Bed mobility  Rolling to Right: Supervision  Supine to Sit: Supervision  Scooting: Supervision  Transfers  Sit to stand: Contact guard assistance  Stand to sit: Contact guard assistance  Vision - Basic Assessment  Patient Visual Report: No visual complaint reported.   Cognition  Overall Cognitive Status: WFL  Perception  Overall Perceptual Status: WFL     Sensation  Overall Sensation Status: Impaired(reports numbness in buttock due to block)        LUE AROM (degrees)  LUE AROM : WNL  RUE AROM (degrees)  RUE AROM : WNL  LUE Strength  L Hand General: 5/5  RUE Strength  R Hand General: 5/5                   Plan   Plan  Times per week: 7  Times per day: Daily  Current Treatment Recommendations: Strengthening, Balance Training, Functional Mobility Training, Patient/Caregiver Education & Training, Self-Care / ADL      AM-[facility] Score        AM-[facility] Inpatient Daily Activity Raw Score: 21 (01/28/20 1540)  AM-PAC Inpatient ADL T-Scale Score : 44.27 (01/28/20 1540)  ADL Inpatient CMS 0-100% Score: 32.79 (01/28/20 1540)  ADL Inpatient CMS G-Code Modifier : Amie Bamberger (01/28/20 1540)    Goals  Short term goals  Time Frame for Short term goals: Discharge  Short term goal 1: Bed mobility I  Short term goal 2: Functional ADL transfers mod I  Short term goal 3: Functional mobility mod I  Short term goal 4: ADLs (bathing, dressing, toileting) mod I  Long term goals  Time Frame for Long term goals : LTG=STG  Patient Goals   Patient goals : Home       Therapy Time   Individual Concurrent Group Co-treatment   Time In 7113         Time Out 1510         Minutes 41              Timed Code Treatment Minutes:  26 Minutes    Total Treatment Minutes:  [address] [facility], OTR/L [id number]

## 2020-01-29 VITALS
WEIGHT: 146.8 LBS | OXYGEN SATURATION: 98 % | TEMPERATURE: 98 F | BODY MASS INDEX: 22.25 KG/M2 | HEART RATE: 63 BPM | SYSTOLIC BLOOD PRESSURE: 145 MMHG | HEIGHT: 68 IN | RESPIRATION RATE: 16 BRPM | DIASTOLIC BLOOD PRESSURE: 71 MMHG

## 2020-01-29 LAB
ANION GAP SERPL CALCULATED.3IONS-SCNC: 8 MMOL/L (ref 3–16)
BUN BLDV-MCNC: 11 MG/DL (ref 7–20)
CALCIUM SERPL-MCNC: 8.8 MG/DL (ref 8.3–10.6)
CHLORIDE BLD-SCNC: 100 MMOL/L (ref 99–110)
CO2: 25 MMOL/L (ref 21–32)
CREAT SERPL-MCNC: 0.6 MG/DL (ref 0.6–1.2)
GFR AFRICAN AMERICAN: >60
GFR NON-AFRICAN AMERICAN: >60
GLUCOSE BLD-MCNC: 101 MG/DL (ref 70–99)
GLUCOSE BLD-MCNC: 117 MG/DL (ref 70–99)
GLUCOSE BLD-MCNC: 123 MG/DL (ref 70–99)
HCT VFR BLD CALC: 30.9 % (ref 36–48)
HEMOGLOBIN: 10.5 G/DL (ref 12–16)
MCH RBC QN AUTO: 32.4 PG (ref 26–34)
MCHC RBC AUTO-ENTMCNC: 33.9 G/DL (ref 31–36)
MCV RBC AUTO: 95.6 FL (ref 80–100)
PDW BLD-RTO: 12.9 % (ref 12.4–15.4)
PERFORMED ON: ABNORMAL
PERFORMED ON: ABNORMAL
PLATELET # BLD: 220 K/UL (ref 135–450)
PMV BLD AUTO: 8.6 FL (ref 5–10.5)
POTASSIUM REFLEX MAGNESIUM: 4.1 MMOL/L (ref 3.5–5.1)
RBC # BLD: 3.23 M/UL (ref 4–5.2)
SODIUM BLD-SCNC: 133 MMOL/L (ref 136–145)
WBC # BLD: 8.5 K/UL (ref 4–11)

## 2020-01-29 PROCEDURE — 6360000002 HC RX W HCPCS: Performed by: PHYSICIAN ASSISTANT

## 2020-01-29 PROCEDURE — G0378 HOSPITAL OBSERVATION PER HR: HCPCS

## 2020-01-29 PROCEDURE — 97116 GAIT TRAINING THERAPY: CPT

## 2020-01-29 PROCEDURE — 85027 COMPLETE CBC AUTOMATED: CPT

## 2020-01-29 PROCEDURE — 99024 POSTOP FOLLOW-UP VISIT: CPT | Performed by: NURSE PRACTITIONER

## 2020-01-29 PROCEDURE — APPNB30 APP NON BILLABLE TIME 0-30 MINS: Performed by: NURSE PRACTITIONER

## 2020-01-29 PROCEDURE — 97530 THERAPEUTIC ACTIVITIES: CPT

## 2020-01-29 PROCEDURE — 97535 SELF CARE MNGMENT TRAINING: CPT

## 2020-01-29 PROCEDURE — 80048 BASIC METABOLIC PNL TOTAL CA: CPT

## 2020-01-29 PROCEDURE — 36415 COLL VENOUS BLD VENIPUNCTURE: CPT

## 2020-01-29 PROCEDURE — 6370000000 HC RX 637 (ALT 250 FOR IP): Performed by: PHYSICIAN ASSISTANT

## 2020-01-29 PROCEDURE — 2580000003 HC RX 258: Performed by: PHYSICIAN ASSISTANT

## 2020-01-29 RX ORDER — OXYCODONE HYDROCHLORIDE AND ACETAMINOPHEN 5; 325 MG/1; MG/1
1-2 TABLET ORAL EVERY 4 HOURS PRN
Qty: 42 TABLET | Refills: 0 | Status: SHIPPED | OUTPATIENT
Start: 2020-01-29 | End: 2020-02-12 | Stop reason: SDUPTHER

## 2020-01-29 RX ADMIN — OXYCODONE 5 MG: 5 TABLET ORAL at 10:08

## 2020-01-29 RX ADMIN — OXYCODONE 5 MG: 5 TABLET ORAL at 00:41

## 2020-01-29 RX ADMIN — CEFAZOLIN SODIUM 2 G: 2 INJECTION, SOLUTION INTRAVENOUS at 00:41

## 2020-01-29 RX ADMIN — Medication 10 ML: at 09:05

## 2020-01-29 RX ADMIN — ASPIRIN 325 MG: 325 TABLET, COATED ORAL at 09:02

## 2020-01-29 RX ADMIN — ACETAMINOPHEN 650 MG: 325 TABLET, FILM COATED ORAL at 06:01

## 2020-01-29 RX ADMIN — OXYCODONE 10 MG: 5 TABLET ORAL at 14:29

## 2020-01-29 RX ADMIN — DOCUSATE SODIUM 100 MG: 100 CAPSULE, LIQUID FILLED ORAL at 09:02

## 2020-01-29 RX ADMIN — OXYCODONE 10 MG: 5 TABLET ORAL at 06:01

## 2020-01-29 RX ADMIN — HYDROCHLOROTHIAZIDE 12.5 MG: 25 TABLET ORAL at 09:03

## 2020-01-29 RX ADMIN — LISINOPRIL 10 MG: 10 TABLET ORAL at 09:02

## 2020-01-29 RX ADMIN — SENNOSIDES AND DOCUSATE SODIUM 1 TABLET: 8.6; 5 TABLET ORAL at 09:02

## 2020-01-29 RX ADMIN — ACETAMINOPHEN 650 MG: 325 TABLET, FILM COATED ORAL at 14:25

## 2020-01-29 ASSESSMENT — PAIN DESCRIPTION - PAIN TYPE
TYPE: SURGICAL PAIN

## 2020-01-29 ASSESSMENT — PAIN SCALES - GENERAL
PAINLEVEL_OUTOF10: 6
PAINLEVEL_OUTOF10: 1
PAINLEVEL_OUTOF10: 5
PAINLEVEL_OUTOF10: 4
PAINLEVEL_OUTOF10: 2
PAINLEVEL_OUTOF10: 7
PAINLEVEL_OUTOF10: 1
PAINLEVEL_OUTOF10: 7
PAINLEVEL_OUTOF10: 1
PAINLEVEL_OUTOF10: 0

## 2020-01-29 ASSESSMENT — PAIN DESCRIPTION - LOCATION
LOCATION: KNEE

## 2020-01-29 ASSESSMENT — PAIN DESCRIPTION - ORIENTATION
ORIENTATION: RIGHT
ORIENTATION: RIGHT

## 2020-01-29 NOTE — CARE COORDINATION
[address] [place] [address] received a referral to this patient for a rolling walker. Rolling walker has been delivered to the patients room. Thank you for the referral.  Electronically signed by Rayna Eller on 1/29/2020 at 11:37 AM  Cell ph# [phone]    NOTE: After 5:00 pm, Weekends, Holidays: Call [facility] at [phone] to coordinate delivery of home medical equipment.

## 2020-01-29 NOTE — PROGRESS NOTES
oz (66.6 kg)   01/22/20 148 lb (67.1 kg)   11/04/19 146 lb 9.6 oz (66.5 kg)       General appearance:  Appears comfortable  Eyes: Sclera clear. Pupils equal.  ENT: Moist oral mucosa. Trachea midline, no adenopathy. Cardiovascular: Regular rhythm, normal S1, S2. No murmur. No edema in lower extremities  Respiratory: Not using accessory muscles. Good inspiratory effort. Clear to auscultation bilaterally, no wheeze or crackles. GI: Abdomen soft, no tenderness, not distended, normal bowel sounds  Musculoskeletal: No cyanosis in digits, neck supple  Neurology: CN 2-12 grossly intact. No speech or motor deficits  Psych: Normal affect. Alert and oriented in time, place and person  Skin: Warm, dry, normal turgor  Extremity exam shows brisk capillary refill. Peripheral pulses are palpable in lower extremities     Labs and Tests:  CBC:   Recent Labs     01/29/20  0649   WBC 8.5   HGB 10.5*        BMP:    Recent Labs     01/29/20  0649   *   K 4.1      CO2 25   BUN 11   CREATININE 0.6   GLUCOSE 123*     Hepatic: No results for input(s): AST, ALT, ALB, BILITOT, ALKPHOS in the last 72 hours. XR KNEE RIGHT (1-2 VIEWS)   Preliminary Result   Status post right knee arthroplasty. Problem List  Active Problems:    1/28/20 RIGHT TKA  Resolved Problems:    * No resolved hospital problems.  *       Assessment & Plan:     Right total knee arthroplasty postop day 1    Hypertension controlled continue current medications    HyperLipidemia controlled      Stas Ayers MD   1/29/2020 1:13 PM

## 2020-01-29 NOTE — DISCHARGE INSTR - COC
Continuity of Care Form    Patient Name: Benny Awad   :  1950  MRN:  [id number]    Admit date:  2020  Discharge date: 2020    Code Status Order: Full Code   Advance Directives:   Advance Care Flowsheet Documentation     Date/Time Healthcare Directive Type of Healthcare Directive Copy in [address] Agent's Name Healthcare Agent's Phone Number    20 1431  Yes, patient has an advance directive for healthcare treatment  --  No, copy requested from family  --  --  --    19 1544  Yes, patient has an advance directive for healthcare treatment patient instructed to bring  --  --  --  --  --          Admitting Physician:  Alejandra Palacio MD  PCP: Bob Carroll MD    Discharging Nurse: [facility] Unit/Room#: A9H-6119/3365-78  Discharging Unit Phone Number: [phone]    Emergency Contact:   Extended Emergency Contact Information  Primary Emergency Contact: Mikie Alonso Phone: [phone]  Relation: Child    Past Surgical History:  Past Surgical History:   Procedure Laterality Date    MIDDLE EAR SURGERY      Done 2 times.      VARICOSE VEIN SURGERY         Immunization History:   Immunization History   Administered Date(s) Administered    Influenza Virus Vaccine 10/01/2015    Influenza, High Dose (Fluzone 65 yrs and older) 2016, 2017, 2018    Influenza, Triv, inactivated, subunit, adjuvanted, IM (Fluad 65 yrs and older) 2019    Pneumococcal Conjugate 13-valent (Spictsu99) 2017    Pneumococcal Polysaccharide (Avfdncxkj28) 2018    Zoster Recombinant (Shingrix) 2019, 2019       Active Problems:  Patient Active Problem List   Diagnosis Code    Hyperlipidemia E78.5    Essential hypertension, benign I10    20 RIGHT TKA M17.11       Isolation/Infection:   Isolation          No Isolation        Patient Infection Status     None to display          Nurse Assessment:  Last Vital Signs: /74   Pulse 63   Temp 97.4 °F (36.3 °C) (Oral)   Resp 14   Ht 5' 8\" (1.727 m)   Wt 146 lb 12.8 oz (66.6 kg)   LMP 02/14/1992   SpO2 95%   BMI 22.32 kg/m²     Last documented pain score (0-10 scale): Pain Level: 1  Last Weight:   Wt Readings from Last 1 Encounters:   01/28/20 146 lb 12.8 oz (66.6 kg)     Mental Status:  oriented and alert    IV Access:  - None    Nursing Mobility/ADLs:  Walking   Assisted  Transfer  Assisted  Bathing  Assisted  Dressing  Assisted  Toileting  Assisted  Feeding  Independent  Med Admin  Independent  Med Delivery   whole    Wound Care Documentation and Therapy:        Elimination:  Continence:   · Bowel: Yes  · Bladder: Yes  Urinary Catheter: None   Colostomy/Ileostomy/Ileal Conduit: No       Date of Last BM:     Intake/Output Summary (Last 24 hours) at 1/29/2020 1019  Last data filed at 1/29/2020 0546  Gross per 24 hour   Intake 2849 ml   Output --   Net 2849 ml     I/O last 3 completed shifts: In: 4449 [P.O.:960; I.V.:3489]  Out: 26 [Urine:500; Blood:20]    Safety Concerns: At Risk for Falls    Impairments/Disabilities:      None    Nutrition Therapy:  Current Nutrition Therapy:   - Oral Diet:  General    Routes of Feeding: Oral  Liquids: Thin Liquids  Daily Fluid Restriction: no  Last Modified Barium Swallow with Video (Video Swallowing Test): not done    Treatments at the Time of Hospital Discharge:   Respiratory Treatments:   Oxygen Therapy:  is not on home oxygen therapy. Ventilator:    - No ventilator support    Rehab Therapies: Physical Therapy  Weight Bearing Status/Restrictions: Weight bear as tolerate  Other Medical Equipment (for information only, NOT a DME order):  walker  Other Treatments:   Wound Care:   - Remove overdressing in 1 week. Maintain Prineo (glued clear dressing over incision) until your 2-week post op appointment. - Keep incisional area clean at all times.  - KEEP PETS AWAY FROM YOUR INCISION. - May shower; no baths.   - Continue use of cooling pad/ice pack as needed for pain. - Wear compression hose during the day until your first post op office appointment. - Do only those exercises prescribed by your therapist while in the hospital.    - Prevent constipation by drinking plenty of water and using stool softeners/laxatives as needed. DVT prophylaxis.  mg twice daily for 14 days to begin day after DC from acute hospital  (This will replace any home aspirin you might be taking x 14 days). Follow-Up:  Future Appointments   Date Time Provider Laura Zambrano   2/12/2020  2:00 PM Chepe Goncalves PA-C [facility]      ([phone])    May discard excess narcotic medications at the following facilities:    [facility]. [facility] [employer] [facility]. [address] [place] [address]. [address]    Patient's personal belongings (please select all that are sent with patient):  None    RN SIGNATURE:  Electronically signed by Elizabeth Nolen RN on 1/29/20 at 4:23 PM    CASE MANAGEMENT/SOCIAL WORK SECTION    Inpatient Status Date: ***    Readmission Risk Assessment Score:  Readmission Risk              Risk of Unplanned Readmission:        7           Discharging to Facility/ Agency   · Name: [facility]   · Address:  · Phone: [phone]  · Fax: [phone]    / signature: Electronically signed by KIRSTIE Anderson on 1/29/2020 at 12:34 PM      PHYSICIAN SECTION    Prognosis: Good    Condition at Discharge: Stable    Rehab Potential (if transferring to Rehab): Good    Recommended Labs or Other Treatments After Discharge: Home PT    Physician Certification: I certify the above information and transfer of Alek Sexton  is necessary for the continuing treatment of the diagnosis listed and that she requires Home Care for less 30 days.      Update Admission H&P: Changes in H&P as follows - s/p TKA    PHYSICIAN SIGNATURE:  Electronically signed by ERIN Conway CNP on 1/29/20 at 10:19 AM

## 2020-01-29 NOTE — PROGRESS NOTES
[facility]   Progress Note    CHIEF COMPLAINT/DIAGNOSIS:  1/28/20 RIGHT TKA    SUBJECTIVE: Patient sitting up in bedside chair. She has worked with PT this morning. Pain controlled; ready to DC home today. OBJECTIVE  Physical    VITALS:  /74   Pulse 63   Temp 97.4 °F (36.3 °C) (Oral)   Resp 14   Ht 5' 8\" (1.727 m)   Wt 146 lb 12.8 oz (66.6 kg)   LMP 02/14/1992   SpO2 95%   BMI 22.32 kg/m²     GENERAL: Alert, NAD   MUSCULOSKELETAL: RIGHT LE  INCISION:  Overdressing c/d/i  ROM: intact DF/PF  Sensory:  Intact to light touch in peroneal and tibial distributions  Vascular:   Intact dors ped pulse;  calf soft and nontender    Data    ALL MEDICATIONS HAVE BEEN REVIEWED    CBC:   Recent Labs     01/29/20  0649   WBC 8.5   HGB 10.5*   HCT 30.9*        BMP:   Recent Labs     01/29/20  0649   *   K 4.1      CO2 25   BUN 11   CREATININE 0.6     INR: No results for input(s): INR in the last 72 hours. ASSESSMENT:  1/28/20 RIGHT TKA, POD#1  HTN    PLAN:   - WB status:  WBAT   - DVT prophylaxis: ASA BID  - PT/OT  - D/C Plan: home today with home PT  - Pain Control: current regimen. Due to orthopaedic surgical procedure/condition, patient may require pain medication for up to 6-8 weeks.   - Expected post op acute blood loss anemia: Hg. 10.5 g/dL  - F U with Orthopaedics 2/12/20 at 2:00 PM.     ERIN TAMEZ-CNP  1/29/2020  10:17 AM    .

## 2020-01-29 NOTE — PLAN OF CARE
Problem: Discharge Planning:  Goal: Discharged to appropriate level of care  Description  Discharged to appropriate level of care  1/29/2020 1135 by Abel Nguyen RN  Outcome: Ongoing  1/28/2020 2333 by Romana Devoid, RN  Outcome: Ongoing     Problem: Mobility - Impaired:  Goal: Mobility will improve  Description  Mobility will improve  1/29/2020 1135 by Abel Nguyen RN  Outcome: Ongoing  1/28/2020 2333 by Romana Devoid, RN  Outcome: Ongoing     Problem: Infection - Surgical Site:  Goal: Will show no infection signs and symptoms  Description  Will show no infection signs and symptoms  1/29/2020 1135 by bAel Nguyen RN  Outcome: Ongoing  1/28/2020 2333 by Romana Devoid, RN  Outcome: Ongoing     Problem: Pain - Acute:  Goal: Pain level will decrease  Description  Pain level will decrease  1/29/2020 1135 by Abel Nguyen RN  Outcome: Ongoing  1/28/2020 2333 by Romana Devoid, RN  Outcome: Ongoing     Problem: Cardiac:  Goal: Ability to maintain vital signs within normal range will improve  Description  Ability to maintain vital signs within normal range will improve  1/29/2020 1135 by Abel Nguyen RN  Outcome: Ongoing  1/28/2020 2333 by Romana Devoid, RN  Outcome: Ongoing  Goal: Cardiovascular alteration will improve  Description  Cardiovascular alteration will improve  1/29/2020 1135 by Abel Nguyen RN  Outcome: Ongoing  1/28/2020 2333 by Romana Devoid, RN  Outcome: Ongoing     Problem: Physical Regulation:  Goal: Complications related to the disease process, condition or treatment will be avoided or minimized  Description  Complications related to the disease process, condition or treatment will be avoided or minimized  1/29/2020 1135 by Abel Nguyen RN  Outcome: Ongoing  1/28/2020 2333 by Romana Devoid, RN  Outcome: Ongoing

## 2020-01-29 NOTE — PROGRESS NOTES
Mobility Raw Score : 18 (01/29/20 1208)  AM-PAC Inpatient T-Scale Score : 43.63 (01/29/20 1208)  Mobility Inpatient CMS 0-100% Score: 46.58 (01/29/20 1208)  Mobility Inpatient CMS G-Code Modifier : CK (01/29/20 1208)          Goals  Short term goals  Time Frame for Short term goals: by d/c   Short term goal 1: Pt will complete all bed mobility independently   Short term goal 2: Pt will complete transfers with RW Mod I   Short term goal 3: Pt will ascend/descend 7 stairs with handrail and supervision   Short term goal 4: Pt will complete car transfer with RW and supervision   Short term goal 5: Pt will obtain 90 degrees of R knee flexion--MET 1/29/2020   Short term goal 6: Pt will ambulate 160' with RW Mod I   Patient Goals   Patient goals : to do the steps  1 GOAL MET THIS [place] [address]  Times per week: 7x  Times per day: Twice a day  Current Treatment Recommendations: Strengthening, Transfer Training, Endurance Training, ROM, Balance Training, Gait Training, Home Exercise Program, Functional Mobility Training, Stair training, Neuromuscular Re-education, Safety Education & Training, Patient/Caregiver Education & Training, Equipment Evaluation, Education, & procurement, Modalities  Safety Devices  Type of devices: Call light within reach, Left in chair, Chair alarm in place, Gait belt, Nurse notified, All fall risk precautions in place, Patient at risk for falls  Restraints  Initially in place: No     Therapy Time   Individual Concurrent Group Co-treatment   Time In 1137         Time Out 1202         Minutes 25         Timed Code Treatment Minutes: [address], [address]      I agree with the note above. Goals addressed by PT this session.    [address], [place] [address]

## 2020-01-29 NOTE — PROGRESS NOTES
Occupational Therapy  Facility/Department: [address]  Daily Treatment Note  NAME: Hiwot Ramirez  : 1950  MRN: [id number]    Date of Service: 2020    Discharge Recommendations: Hiwot Ramirez scored a 21/24 on the AM-PAC ADL Inpatient form. Current research shows that an AM-PAC score of 18 or greater is typically associated with a discharge to the patient's home setting. Based on the patients AM-PAC score and their current ADL deficits, it is recommended that the patient have 2-3 sessions per week of Occupational Therapy at d/c to increase the patients independence. No further OT is recommended at this time due to pt functioning near baseline. OT Equipment Recommendations  Equipment Needed: No    Assessment   Performance deficits / Impairments: Decreased functional mobility ; Decreased ADL status; Decreased high-level IADLs  Treatment Diagnosis: Decreased ADLs, IADLs, transfers associated with R TKA  Prognosis: Good  Decision Making: Low Complexity  OT Education: OT Role;Plan of Care;ADL Adaptive Strategies;Transfer Training;IADL Safety;Precautions  REQUIRES OT FOLLOW UP: Yes  Activity Tolerance  Activity Tolerance: Patient Tolerated treatment well  Safety Devices  Safety Devices in place: Yes  Type of devices: All fall risk precautions in place;Call light within reach; Chair alarm in place;Gait belt;Patient at risk for falls; Left in chair;Nurse notified         Patient Diagnosis(es): The encounter diagnosis was Preop testing. has a past medical history of Arthritis, Hyperlipidemia, and Hypertension. has a past surgical history that includes Middle ear surgery and Varicose vein surgery.     Restrictions  Restrictions/Precautions  Restrictions/Precautions: Weight Bearing, Fall Risk(low fall risk)  Required Braces or Orthoses?: No  Lower Extremity Weight Bearing Restrictions  Right Lower Extremity Weight Bearing: Weight Bearing As Tolerated  Position Activity Restriction  Other position/activity restrictions: S/P R TKA 1/28/2020  Subjective   General  Chart Reviewed: Yes  Response to previous treatment: Patient with no complaints from previous session  Family / Caregiver Present: No  Diagnosis: R TKA  Subjective  Subjective: Pt supine in bed upon arrival. Agreeable to therapy session. Reporting 1/10 pain in R knee, RN present in room and aware to administer pain medications accordingly. Pt able to continue with session. Orientation  Orientation  Overall Orientation Status: Within Normal Limits  Objective    ADL  Grooming: Modified independent   UE Dressing: Modified independent   LE Dressing: Modified independent   Toileting: Modified independent   Additional Comments: Pt performed functional mobility to/from restroom and transferred to toilet, pt able to perform LB clothing management in stance. Pt completed calos care seated on toilet, threaded B LEs into underwear and pants seated on toilet, pt able to complete LB clothing management over hips in stance-mod I. Pt completed hand/oral hygiene in stance at sink. Pt seated in recliner chair completed UB dressing. Pt declined bathing tasks during session.          Balance  Sitting Balance: Independent  Standing Balance: Modified independent   Standing Balance  Time: ~10 minutes  Activity: functional mobility to/from restroom, in stance to complete LB clothing management, grooming tasks in stance at sink  Comment: with use of rw   Functional Mobility  Functional - Mobility Device: Rolling Walker  Activity: To/from bathroom  Assist Level: Modified independent   Toilet Transfers  Toilet - Technique: Ambulating  Equipment Used: Standard toilet  Toilet Transfer: Modified independent  Toilet Transfers Comments: use of L grab bar  Bed mobility  Supine to Sit: Modified independent  Scooting: Modified independent  Comment: HOB elevated  Transfers  Sit to stand: Modified independent  Stand to sit: Modified independent         Cognition  Overall Cognitive Status: WFL     Perception  Overall Perceptual Status: WFL        Plan   Plan  Times per week: 7  Times per day: Daily  Current Treatment Recommendations: Strengthening, Balance Training, Functional Mobility Training, Patient/Caregiver Education & Training, Self-Care / ADL           AM-PAC Score        AM-PAC Inpatient Daily Activity Raw Score: 21 (01/29/20 1012)  AM-PAC Inpatient ADL T-Scale Score : 44.27 (01/29/20 1012)  ADL Inpatient CMS 0-100% Score: 32.79 (01/29/20 1012)  ADL Inpatient CMS G-Code Modifier : Yashira Dickey (01/29/20 1012)    Goals  Short term goals  Time Frame for Short term goals: Discharge  Short term goal 1: Bed mobility mod I-goal met 1/29  Short term goal 2: Functional ADL transfers mod I-goal met 1/29  Short term goal 3: Functional mobility mod I-goal met 1/29  Short term goal 4: ADLs (bathing, dressing, toileting) mod I-goal met 1/29  Long term goals  Time Frame for Long term goals : LTG=STG  Patient Goals   Patient goals : Home       Therapy Time   Individual Concurrent Group Co-treatment   Time In 0902         Time Out 0945         Minutes 43            Timed Code Treatment Minutes:  43 minutes  Total Treatment Minutes:  43 minutes    [address]

## 2020-01-29 NOTE — CARE COORDINATION
Spoke w/pt regarding daughter in laws concern for Rn and OT not being ordered. Informed pt that home PT is able to check pt's dressing and relay concerns to her and MD.  Pt stated she worked w/OT today, dressed herself and that she only needs extended time for dressing. Informed pt that OT could be ordered, but since pt is performing her own ADL's that ins would likely not pay for OT. Pt stated she worked w/PT and managed the stairs on her own and feels comfortable going home. Pt has no further needs.   Electronically signed by KIRSTIE Rae on 1/29/2020 at 2:48 PM]

## 2020-01-30 ENCOUNTER — TELEPHONE (OUTPATIENT)
Dept: INTERNAL MEDICINE CLINIC | Age: 70
End: 2020-01-30

## 2020-01-30 NOTE — TELEPHONE ENCOUNTER
[facility] Trans[facility] Initial Follow Up Call    Outreach made within 2 business days of discharge: Yes    Patient: Deepti Brain Patient : 1950   MRN: [id number]  Reason for Admission: There are no discharge diagnoses documented for the most recent discharge. Discharge Date: 20       Spoke with: Deepa Mccoy    Discharge department/facility: [facility]    TCM Interactive Patient Contact:  Was patient able to fill all prescriptions: Yes  Was patient instructed to bring all medications to the follow-up visit: Yes  Is patient taking all medications as directed in the discharge summary?  Yes  Does patient understand their discharge instructions: Yes  Does patient have questions or concerns that need addressed prior to 7-14 day follow up office visit: no    Scheduled appointment with PCP within 7-14 days    Follow Up  Future Appointments   Date Time Provider Laura Zambrano   2020  2:00 PM Jose M Cantu PA-C [facility] MICHELLE Snyder MA

## 2020-01-31 ENCOUNTER — TELEPHONE (OUTPATIENT)
Dept: ORTHOPEDIC SURGERY | Age: 70
End: 2020-01-31

## 2020-01-31 NOTE — TELEPHONE ENCOUNTER
Placed phone call to patient today in follow up from right total knee surgery on 1/28/20. Patient is doing well. Pain is controlled with current medications. Reminded patient to take stool softeners or even a gentle laxative if needed. Patient states dressings are clean and dry without erythema or induration around bandage. Patient has been icing. Home care has been initiated. Patient's post-op appointment day and time were confirmed. Advised patient to call the office before that time with any questions or concerns.      Electronically signed by Jayden Garcia PA-C on 6/84/2935  Board Certified Physician Assistant  [id number] [facility]

## 2020-02-03 ENCOUNTER — TELEPHONE (OUTPATIENT)
Dept: ORTHOPEDIC SURGERY | Age: 70
End: 2020-02-03

## 2020-02-03 RX ORDER — OXYCODONE HYDROCHLORIDE AND ACETAMINOPHEN 5; 325 MG/1; MG/1
1-2 TABLET ORAL EVERY 4 HOURS PRN
Qty: 42 TABLET | Refills: 0 | Status: SHIPPED | OUTPATIENT
Start: 2020-02-03 | End: 2020-02-10

## 2020-02-12 ENCOUNTER — TELEPHONE (OUTPATIENT)
Dept: ORTHOPEDIC SURGERY | Age: 70
End: 2020-02-12

## 2020-02-12 ENCOUNTER — OFFICE VISIT (OUTPATIENT)
Dept: ORTHOPEDIC SURGERY | Age: 70
End: 2020-02-12

## 2020-02-12 VITALS — HEIGHT: 68 IN | WEIGHT: 145 LBS | BODY MASS INDEX: 21.98 KG/M2

## 2020-02-12 PROCEDURE — 99024 POSTOP FOLLOW-UP VISIT: CPT | Performed by: PHYSICIAN ASSISTANT

## 2020-02-12 RX ORDER — OXYCODONE HYDROCHLORIDE AND ACETAMINOPHEN 5; 325 MG/1; MG/1
1-2 TABLET ORAL EVERY 4 HOURS PRN
Qty: 42 TABLET | Refills: 0 | Status: SHIPPED | OUTPATIENT
Start: 2020-02-12 | End: 2020-02-26

## 2020-02-12 NOTE — PROGRESS NOTES
Patient Name: Qi Smart  Medical Record Number: [id number]  YOB: 1950  Date of Encounter: 2/12/2020     Chief Complaint   Patient presents with    Post-Op Check     Right TKA, robotic; DOS 1/28/20. Total Knee Follow-up  Qi Smart is here for 2 weeks post right total knee arthroplasty follow-up. Pain is controlled with Tylenol and occasional oxycodone. The patient denies fever, wound drainage, increasing redness, pus, increasing pain, increasing swelling. Post op problems reported: none. She is ambulating using a straight cane. She is working with home physical therapy. Patient is still taking Vitamin D supplementation. DVT prophylaxis is completed. The patient's  past medical history, medications, allergies,  family history, social history, and review of systems have been reviewed, and dated and are recorded in the chart under the 'MEDIA\" tab. Physical Exam:   Ms. Qi Smart appears well, she is in no apparent distress, she demonstrates appropriate mood & affect. She is alert and oriented to person, place and time. Ht 5' 8\" (1.727 m)   Wt 145 lb (65.8 kg)   LMP 02/14/1992   BMI 22.05 kg/m²     Right Knee: She has mild swelling which is resolving as expected. The incision is clean, dry, intact and nontender and without erythema, induration or warmth. Range of motion from -5 to 100 degrees. She has minimal pain with range of motion of the knee. Patient has 4-/5 motor strength with flexion and extension of the right knee. She is able to do straight leg raises without difficulty. Patient is wearing her bilateral CHARMAINE stockings. There is minimal right lower extremity edema and no evidence of DVT. There is no leg length discrepancy. She is neurovascularly intact distally. Radiology:  X-rays obtained and reviewed in office:  Views: 2 views right knee. Impression: Stable total knee arthroplasty with satisfactory alignment.  There is no evidence of loosening or subsidence. Orders:  Orders Placed This Encounter   Procedures    XR KNEE RIGHT (1-2 VIEWS)    [facility]     Impression:   Status post right total knee arthroplasty and doing very well. Plan: At this time, she will finish home physical therapy and start outpatient physical therapy. She is given a refill of oxycodone but has been weaning down on this medication. Follow up will be in 3 week(s) and no radiology should be warranted. Patient will return to the office sooner for worsening or changes in symptoms. Roxie Lovealmaz was informed of the results of any imaging. We discussed her postop course to date and a time was given to answer questions. She understand and accepts this course of care. Electronically signed by Aleksey Barajas PA-C on 0/78/6084  Board Certified [facility]    Please note that portions of this note were completed with a voice recognition program.  Efforts were made to edit the dictations but occasionally words are mis-transcribed.

## 2020-02-12 NOTE — TELEPHONE ENCOUNTER
[facility] called req a change in this pt Rx dosage (oxycodone). Please advise.        Call [phone]

## 2020-02-21 ENCOUNTER — TELEPHONE (OUTPATIENT)
Dept: ORTHOPEDIC SURGERY | Age: 70
End: 2020-02-21

## 2020-02-21 NOTE — TELEPHONE ENCOUNTER
Pt called in regards to symptoms below:    Sx 01/28/20 Right TKA    Prick ely, itchy rash around the sx site in front of the leg. Is this normal?    What she do or use?     Please call back to discuss

## 2020-02-25 ENCOUNTER — HOSPITAL ENCOUNTER (OUTPATIENT)
Dept: PHYSICAL THERAPY | Age: 70
Setting detail: THERAPIES SERIES
Discharge: HOME | End: 2020-02-25
Payer: MEDICARE

## 2020-02-25 PROCEDURE — 97140 MANUAL THERAPY 1/> REGIONS: CPT

## 2020-02-25 PROCEDURE — 97164 PT RE-EVAL EST PLAN CARE: CPT

## 2020-02-25 PROCEDURE — 97110 THERAPEUTIC EXERCISES: CPT

## 2020-02-25 PROCEDURE — 97530 THERAPEUTIC ACTIVITIES: CPT

## 2020-02-25 NOTE — PLAN OF CARE
[address] [place] [address] Physical Therapy   Physical Therapy Re-Certification Plan of Care    Dear Referring Practitioner: Maria Elena Vaughn MD,    We had the pleasure of treating the following patient for physical therapy services at [address] [place] [address]. A summary of our findings can be found in the updated assessment below. This includes our plan of care. If you have any questions or concerns regarding these findings, please do not hesitate to contact me at the office phone number checked above. Thank you for the referral.     Physician Signature:________________________________Date:__________________  By signing above (or electronic signature), therapists plan is approved by physician      Functional Outcome: LEFS: raw 44 = 45% LOF    Overall Response to Treatment:   []Patient is responding well to treatment and improvement is noted with regards  to goals   []Patient should continue to improve in reasonable time if they continue HEP   []Patient has plateaued and is no longer responding to skilled PT intervention    []Patient is getting worse and would benefit from return to referring MD   []Patient unable to adhere to initial POC   [x]Other: Patient presents for Re-evaluation 4 weeks post-op R TKA. Pt presents with progressing ROM of lacking 4 deg extension to 110 deg flexion. Pt with fair quad tone and decreased B LE strength in hips and knees. Pt ambulates with slight antalgic gait using SPC in L hand. Rehab assessment completed as reported below. Pt unable to transition into standing without UE support and walks with Trendelenburg that is limited with cane use. Pt cont to benefit form skilled PT for post-op recovery after TKA. Date range of Visits: 2020  Total Visits: 1    Recommendation:    [x]Continue PT 1-2x / wk for 8 weeks.     []Hold PT, pending MD visit      Physical Therapy Daily Treatment Note  Date:  2020    Patient Name:  George Narvaez    KEITH: 1950  MRN: [id number]  Medical/Treatment Diagnosis Information:  · Diagnosis: M17.11 (ICD-10-CM) - Primary osteoarthritis of right knee; R TKA on 1/28/2020  · Treatment Diagnosis: Impaired gait, LE ROM, strength, balance  Insurance/Certification information:  PT Insurance Information: [employer] Medicare ([employer] [place]) $40 COPAY  Physician Information:  Referring Practitioner: Emily Awad  Plan of care signed (Y/N): []  Yes [x]  No     Date of Patient follow up with Physician:   3/5/2020     Progress Report: [x]  Yes  []  No     Date Range for reporting period:  Beginning 2/25/2020   Ending     Progress report due (10 Rx/or 30 days whichever is less):  0/58/5829    Recertification due (POC duration/ or 90 days whichever is less): 8 weeks    Visit # Insurance Allowable Auth required? Date Range   1 [place] []  Yes  [x]  No      Latex Allergy:  [x]NO      []YES  Preferred Language for Healthcare:   [x]English       []other:    Functional Scale: LEFS: 44 = 45% LOF Date assessed: 2/25/2020     Pain level:  1/10     SUBJECTIVE: Pt reports she is doing well. She has been navigating stairs \"up with good, down with bad\", but is able to ascend with R LE lead, unable to descend. She had Home Health PT 2x/week for 3 weeks. She is only taking Advil/Tylenol for her pain after 2 weeks. She reports evening pain is the worst, and her pain disrupts her sleep almost every night.       OBJECTIVE: See eval     Functional Testing  Sx Date 1/28/2020 Prehab Date 11/18/2019 Post-op Re-Eval Date 2/25/2020 4 week F/U Date   SEE REEVAL #s 8 week F/U Date TBD  D/C Date TBD      TUG (sec) 10 13.36      30 second sit to stand (reps) 0 w/o hands  4 w/ hands   0 w/o hands  10 w/ hands      6 minute walk (m) 414 312         Balance:    Narrowed FRANTZ (sec) 10 10      Semi Tandem (sec) 10 10          Tandem (sec) 10 10            SLS (sec) 3 2           Knee AROM L R L R L R L R L R   Flexion 137 125 137 110         Extension 0 (+)2 0 (-)4            Knee Ext: L R L R L R L R L R   MMT (of 5) 4+ 4+ 4+ 4         Knee Ext (#)   37.9 30.7            Hip Abd:  L R L R L R L R L R   MMT (of 5) 4 4- 4- 3+         Hip Abd (#)   10.7 8.6            LEFS (raw) 50 44          RESTRICTIONS/PRECAUTIONS: 1/28/2020 R TKA    Exercises/Interventions:     Therapeutic Exercises (84591) Resistance / level Sets/sec Reps Notes   Bike       IB - Gastroc       Step - HS       Prone Quad Stretch       Heel Slides       Quad Sets       SLR - Flexion        SLR - Sidelying Abduction       LAQ       Standing HS Curl                     Therapeutic Activities (71403)       Re-evaluation and post-op education  15'                                 Neuromuscular Re-ed (11089)                                                 Manual Intervention (68931)       Knee PROM Flex/Ext w/ OP  5'     Tib on Femur A/P Glides Grade II/III 5'  Promoting flexion   Tib/Fib Rotation in slight knee flexion  3'     Patella Mobs Grade II/III 2'                       Pt. Education:  -patient educated on diagnosis, prognosis and expectations for rehab  -all patient questions were answered    HEP instruction:  -patient provided with written and illustrated instructions for HEP (see scanned image in ): post-op TKR protocol  - 2/25: HEP HO provided for post-op TKA exercises listed above - went over briefly with demo and performance     Therapeutic Exercise and NMR EXR  [x] (42929) Provided verbal/tactile cueing for activities related to strengthening, flexibility, endurance, ROM for improvements in LE / Lumbar: LE, proximal hip, and core control with self care, mobility, lifting, ambulation.  [] (80017) Provided verbal/tactile cueing for activities related to improving balance, coordination, kinesthetic sense, posture, motor skill, proprioception to assist with LE / lumbar: LE, proximal hip, and core control in self care, mobility, lifting, ambulation and eccentric single leg control.   [] (85694) Therapist is in proximal hip and/or LS spine soft tissue/joints for the purpose of modulating pain, promoting relaxation,  increasing ROM, reducing/eliminating soft tissue swelling/inflammation/restriction, improving soft tissue extensibility and allowing for proper ROM for normal function with LE / lumbar: self care, mobility, lifting and ambulation. Modalities:  [] (18009) Vasopneumatic compression: Utilized vasopneumatic compression to decrease edema / swelling for the purpose of improving mobility and quad tone / recruitment which will allow for increased overall function including but not limited to self-care, transfers, ambulation, and ascending / descending stairs. Modalities:      Charges:  Timed Code Treatment Minutes: 30   Total Treatment Minutes: 50     [] EVAL - LOW (33260)   [] EVAL - MOD (00978)  [] EVAL - HIGH (13822)  [x] RE-EVAL (26137)  [] TE (44140) x 2     [] Ionto  [] NMR (75716) x      [] Vaso  [x] Manual (63568) x 1     [] Ultrasound  [x] TA x 1    [] Mech Traction (95889)  [] Gait Training x     [] ES (un) (88106):   [] Aquatic therapy x   [] Other:   [] Group:     GOALS:   Patient stated goal: decrease knee pain, walk normal  [] Progressing: [] Met: [] Not Met: [] Adjusted    Therapist goals for Patient:   Short Term Goals: To be achieved in: 2 weeks  1. Independent in HEP and progression per patient tolerance, in order to prevent re-injury. [] Progressing: [] Met: [] Not Met: [] Adjusted  2. Patient will have a decrease in pain to facilitate improvement in movement, function, and ADLs as indicated by Functional Deficits. [] Progressing: [] Met: [] Not Met: [] Adjusted    Long Term Goals: To be achieved in: 8 weeks  1. Disability index score of 10% or less for the LEFS to assist with reaching prior level of function. [] Progressing: [] Met: [] Not Met: [] Adjusted  2.  Patient will demonstrate increased R Knee AROM to 0-120 to allow for proper joint functioning as indicated by patients Alter current plan (see comments)  [x] Plan of care initiated [] Hold pending MD visit [] Discharge    Electronically signed by: Geneva Desai PT, DPT     Note: If patient does not return for scheduled/ recommended follow up visits, his note will serve as a discharge from care along with most recent update on progress.

## 2020-02-27 ENCOUNTER — HOSPITAL ENCOUNTER (OUTPATIENT)
Dept: PHYSICAL THERAPY | Age: 70
Setting detail: THERAPIES SERIES
Discharge: HOME | End: 2020-02-27
Payer: MEDICARE

## 2020-02-27 PROCEDURE — 97530 THERAPEUTIC ACTIVITIES: CPT | Performed by: PHYSICAL THERAPIST

## 2020-02-27 PROCEDURE — 97110 THERAPEUTIC EXERCISES: CPT | Performed by: PHYSICAL THERAPIST

## 2020-02-27 NOTE — FLOWSHEET NOTE
motor skill, proprioception to assist with LE / lumbar: LE, proximal hip, and core control in self care, mobility, lifting, ambulation and eccentric single leg control.   [] (58438) Therapist is in constant attendance of 2 or more patients providing skilled therapy interventions, but not providing any significant amount of measurable one-on-one time to either patient, for improvements in LE / lumbar: LE, proximal hip, and core control in self care, mobility, lifting, ambulation and eccentric single leg control. NMR and Therapeutic Activities:    [x] (87035 or 82664) Provided verbal/tactile cueing for activities related to improving balance, coordination, kinesthetic sense, posture, motor skill, proprioception and motor activation to allow for proper function of LE: / Lumbar core, proximal hip and LE with self care and ADLs  [x] (53053) Gait Re-education- Provided training and instruction to the patient for proper LE, core and proximal hip recruitment and positioning and eccentric body weight control with ambulation re-education including up and down stairs     Home Management Training / Self Care:  [] (05888) Provided self-care/home management training related to activities of daily living and compensatory training, and/or use of adaptive equipment for improvement with: ADLs and compensatory training, meal preparation, safety procedures and instruction in use of adaptive equipment, including bathing, grooming, dressing, personal hygiene, basic household cleaning and chores.      Home Exercise Program:    [x] (11048) Reviewed/Progressed HEP activities related to strengthening, flexibility, endurance, ROM of LE / Lumbar: core, proximal hip and LE for functional self-care, mobility, lifting and ambulation/stair navigation   [] (88814)Reviewed/Progressed HEP activities related to improving balance, coordination, kinesthetic sense, posture, motor skill, proprioception of LE: core, proximal hip and LE for self care, mobility, lifting, and ambulation/stair navigation     Manual Treatments:  PROM / STM / Oscillations-Mobs:  G-I, II, III, IV (PA's, Inf., Post.)  [x] (05354) Provided manual therapy to mobilize LE, proximal hip and/or LS spine soft tissue/joints for the purpose of modulating pain, promoting relaxation,  increasing ROM, reducing/eliminating soft tissue swelling/inflammation/restriction, improving soft tissue extensibility and allowing for proper ROM for normal function with LE / lumbar: self care, mobility, lifting and ambulation. Modalities:  [] (96684) Vasopneumatic compression: Utilized vasopneumatic compression to decrease edema / swelling for the purpose of improving mobility and quad tone / recruitment which will allow for increased overall function including but not limited to self-care, transfers, ambulation, and ascending / descending stairs. Modalities:      Charges:  Timed Code Treatment Minutes: 41   Total Treatment Minutes: 46     [] EVAL - LOW (74147)   [] EVAL - MOD (35710)  [] EVAL - HIGH (60900)  [] RE-EVAL (29383)  [x] TE (46682) x 2     [] Ionto  [] NMR (41282) x      [] Vaso  [] Manual (33388) x      [] Ultrasound  [x] TA x 1    [] Mech Traction (79503)  [] Gait Training x     [] ES (un) (42363):   [] Aquatic therapy x   [] Other:   [] Group:     GOALS:   Patient stated goal: decrease knee pain, walk normal  [] Progressing: [] Met: [] Not Met: [] Adjusted    Therapist goals for Patient:   Short Term Goals: To be achieved in: 2 weeks  1. Independent in HEP and progression per patient tolerance, in order to prevent re-injury. [] Progressing: [] Met: [] Not Met: [] Adjusted  2. Patient will have a decrease in pain to facilitate improvement in movement, function, and ADLs as indicated by Functional Deficits. [] Progressing: [] Met: [] Not Met: [] Adjusted    Long Term Goals: To be achieved in: 8 weeks  1.  Disability index score of 10% or less for the LEFS to assist with reaching prior session to complete exercises as pt. Appears anxious about progression of exercises. Pt. Demonstrates noted improvements in knee flexion and extension mobility. Deficits persisting in quad control, especially at end range. Pt. Challenged by addition of cup walking and required HHA to complete due to deficits in balance. Pt. Instructed to continue to use cane for ambulation at this time due to antalgic gait and balance deficits. Pt. Will continue to benefit from skilled therapy in order to restore knee mobility and functional strength for normalized gait and stairs. Prognosis: [x] Good [] Fair  [] Poor    Patient Requires Follow-up: [x] Yes  [] No    Plan for next treatment session:  Post-op TKA exercises, Knee ROM, quad strengthening, hip strengthening      PLAN: See eval. PT 1-2x/week for 8 weeks (pt. Is concerned about high co-pay)  [x] Continue per plan of care [] Alter current plan (see comments)  [] Plan of care initiated [] Hold pending MD visit [] Discharge    Electronically signed by: Jamal Finnegan PT, DPT     Note: If patient does not return for scheduled/ recommended follow up visits, this note will serve as a discharge from care along with most recent update on progress.

## 2020-03-02 ENCOUNTER — HOSPITAL ENCOUNTER (OUTPATIENT)
Dept: PHYSICAL THERAPY | Age: 70
Setting detail: THERAPIES SERIES
Discharge: HOME | End: 2020-03-02
Payer: MEDICARE

## 2020-03-02 PROCEDURE — 97110 THERAPEUTIC EXERCISES: CPT

## 2020-03-02 PROCEDURE — 97530 THERAPEUTIC ACTIVITIES: CPT

## 2020-03-02 NOTE — FLOWSHEET NOTE
[facility] - Outpatient Physical Therapy    Physical Therapy Daily Treatment Note  Date:  3/2/2020    Patient Name:  Abdirahman Teixeira    :  1950  MRN: [id number]  Medical/Treatment Diagnosis Information:  · Diagnosis: M17.11 (ICD-10-CM) - Primary osteoarthritis of right knee; R TKA on 2020  · Treatment Diagnosis: Impaired gait, LE ROM, strength, balance  Insurance/Certification information:  PT Insurance Information: [employer] Medicare ([employer] [place]) $40 COPAY  Physician Information:  Referring Practitioner: Chante Donald  Plan of care signed (Y/N): [x]  Yes []  No     Date of Patient follow up with Physician:   3/5/2020     Progress Report: []  Yes  [x]  No     Date Range for reporting period:  Beginning 2020   Ending     Progress report due (10 Rx/or 30 days whichever is less):  7310    Recertification due (POC duration/ or 90 days whichever is less): 8 weeks    Visit # Insurance Allowable Auth required? Date Range   3 [place] []  Yes  [x]  No      Latex Allergy:  [x]NO      []YES  Preferred Language for Healthcare:   [x]English       []other:    Functional Scale: LEFS: 44 = 45% LOF Date assessed: 2020     Pain level:  2/10     SUBJECTIVE: Pt. Reports pain is a little higher today due to constant weather changes. She reports walking with cane more per PT's recommendation.       OBJECTIVE:     : knee PROM: -2 extension, 120 flexion w/ heel slides    Functional Testing  Sx Date 2020 Prehab Date 2019 Post-op Re-Eval Date 2020 4 week F/U Date   SEE REEVAL #s 8 week F/U Date TBD  D/C Date TBD      TUG (sec) 10 13.36      30 second sit to stand (reps) 0 w/o hands  4 w/ hands   0 w/o hands  10 w/ hands      6 minute walk (m) 414 312         Balance:    Narrowed FRANTZ (sec) 10 10      Semi Tandem (sec) 10 10          Tandem (sec) 10 10            SLS (sec) 3 2           Knee AROM L R L R L R L R L R   Flexion 137 125 137 110         Extension 0 (+)2 0 (-)4            Knee Ext: L R L R L R L R L R   MMT (of 5) 4+ 4+ 4+ 4         Knee Ext (#)   37.9 30.7            Hip Abd:  L R L R L R L R L R   MMT (of 5) 4 4- 4- 3+         Hip Abd (#)   10.7 8.6            LEFS (raw) 50 44          RESTRICTIONS/PRECAUTIONS: 1/28/2020 R TKA    Exercises/Interventions:     Therapeutic Exercises (07513) Resistance / level Sets/sec Reps Notes   Bike ROM 5'     IB - Gastroc  30\" 3    Seated HS stretch  30\" 3    Prone Quad Stretch  30\" 3    Heel Slides  10\" 10    Quad Sets  10\" 10    SAQ 2# 2 10    SLR - Flexion  1# 2 10 3/2 inc weight   SLR - Sidelying Abduction 1# 2 10 3/2 inc weight   LAQ 2# 3 10 3/2 progressed sets   Standing HS Curl 2# 3 10 3/2 progressed sets   Leg Press - SL  30#  40# 2  2 10 R  10 L ^3/2 for glute strengthening    HR/TR    npv   Prone TKE    npv                 Therapeutic Activities (25531)       Cup walking HHA 2 laps  Cueing to decrease circumduction   TKE blue 5\" hold 20 Cueing to decrease hip compensation   Step up and over 4\" 1 15 Max cueing for quad control upon descend off step   Squat to Chair w/ 2 AirEx pads  TRX 1 15 Max cueing for glut activation upon standing, B hip ER to prevent hip ADD/IR collapse           Neuromuscular Re-ed (12045)                                                 Manual Intervention (34848)       Knee PROM Flex/Ext w/ OP       Tib on Femur A/P Glides Grade II/III 3'  Promoting flexion   Tib/Fib Rotation in slight knee flexion       Patella Mobs Grade II/III 2'                       Pt. Education:  -patient educated on diagnosis, prognosis and expectations for rehab  -all patient questions were answered    HEP instruction:  -patient provided with written and illustrated instructions for HEP (see scanned image in ): post-op TKR protocol  - 2/25: HEP HO provided for post-op TKA exercises listed above - went over briefly with demo and performance     Therapeutic Exercise and NMR EXR  [x] (89258) Provided verbal/tactile cueing for activities related to strengthening, flexibility, endurance, ROM for improvements in LE / Lumbar: LE, proximal hip, and core control with self care, mobility, lifting, ambulation.  [] (09611) Provided verbal/tactile cueing for activities related to improving balance, coordination, kinesthetic sense, posture, motor skill, proprioception to assist with LE / lumbar: LE, proximal hip, and core control in self care, mobility, lifting, ambulation and eccentric single leg control.   [] (04725) Therapist is in constant attendance of 2 or more patients providing skilled therapy interventions, but not providing any significant amount of measurable one-on-one time to either patient, for improvements in LE / lumbar: LE, proximal hip, and core control in self care, mobility, lifting, ambulation and eccentric single leg control. NMR and Therapeutic Activities:    [x] (20295 or 97072) Provided verbal/tactile cueing for activities related to improving balance, coordination, kinesthetic sense, posture, motor skill, proprioception and motor activation to allow for proper function of LE: / Lumbar core, proximal hip and LE with self care and ADLs  [x] (73022) Gait Re-education- Provided training and instruction to the patient for proper LE, core and proximal hip recruitment and positioning and eccentric body weight control with ambulation re-education including up and down stairs     Home Management Training / Self Care:  [] (74028) Provided self-care/home management training related to activities of daily living and compensatory training, and/or use of adaptive equipment for improvement with: ADLs and compensatory training, meal preparation, safety procedures and instruction in use of adaptive equipment, including bathing, grooming, dressing, personal hygiene, basic household cleaning and chores.      Home Exercise Program:    [x] (84476) Reviewed/Progressed HEP activities related to strengthening, flexibility, endurance, ROM of LE / Lumbar: core, proximal hip and LE for functional self-care, mobility, lifting and ambulation/stair navigation   [] (36556)Reviewed/Progressed HEP activities related to improving balance, coordination, kinesthetic sense, posture, motor skill, proprioception of LE: core, proximal hip and LE for self care, mobility, lifting, and ambulation/stair navigation     Manual Treatments:  PROM / STM / Oscillations-Mobs:  G-I, II, III, IV (PA's, Inf., Post.)  [x] (96312) Provided manual therapy to mobilize LE, proximal hip and/or LS spine soft tissue/joints for the purpose of modulating pain, promoting relaxation,  increasing ROM, reducing/eliminating soft tissue swelling/inflammation/restriction, improving soft tissue extensibility and allowing for proper ROM for normal function with LE / lumbar: self care, mobility, lifting and ambulation. Modalities:  [] (48095) Vasopneumatic compression: Utilized vasopneumatic compression to decrease edema / swelling for the purpose of improving mobility and quad tone / recruitment which will allow for increased overall function including but not limited to self-care, transfers, ambulation, and ascending / descending stairs. Modalities:      Charges:  Timed Code Treatment Minutes: 40   Total Treatment Minutes: 68     [] EVAL - LOW (83844)   [] EVAL - MOD (45559)  [] EVAL - HIGH (88586)  [] RE-EVAL (58767)  [x] TE (15751) x 2     [] Ionto  [] NMR (27330) x      [] Vaso  [] Manual (42675) x      [] Ultrasound  [x] TA x 1    [] Mech Traction (96377)  [] Gait Training x     [] ES (un) (44884):   [] Aquatic therapy x   [] Other:   [] Group:     GOALS:   Patient stated goal: decrease knee pain, walk normal  [] Progressing: [] Met: [] Not Met: [] Adjusted    Therapist goals for Patient:   Short Term Goals: To be achieved in: 2 weeks  1. Independent in HEP and progression per patient tolerance, in order to prevent re-injury. [] Progressing: [] Met: [] Not Met: [] Adjusted  2.  Patient will have a decrease in pain to facilitate improvement in movement, function, and ADLs as indicated by Functional Deficits. [] Progressing: [] Met: [] Not Met: [] Adjusted    Long Term Goals: To be achieved in: 8 weeks  1. Disability index score of 10% or less for the LEFS to assist with reaching prior level of function. [] Progressing: [] Met: [] Not Met: [] Adjusted  2. Patient will demonstrate increased R Knee AROM to 0-120 to allow for proper joint functioning as indicated by patients Functional Deficits. [] Progressing: [] Met: [] Not Met: [] Adjusted  3. Patient will demonstrate an increase in Strength to at least 4/5 as well as good proximal hip strength and control to allow for proper functional mobility as indicated by patients Functional Deficits. [] Progressing: [] Met: [] Not Met: [] Adjusted  4. Patient will return to functional activities including ambulation w/o AD or without increased symptoms or restriction. [] Progressing: [] Met: [] Not Met: [] Adjusted  5. Patient will return to reciprocal stair navigation without increased symptoms or restriction. [] Progressing: [] Met: [] Not Met: [] Adjusted    Overall Progression Towards Functional goals/ Treatment Progress Update:  [] Patient is progressing as expected towards functional goals listed. [] Progression is slowed due to complexities/Impairments listed. [] Progression has been slowed due to co-morbidities.   [x] Plan just implemented, too soon to assess goals progression <30days   [] Goals require adjustment due to lack of progress  [] Patient is not progressing as expected and requires additional follow up with physician  [] Other    Persisting Functional Limitations/Impairments:  []Sleeping [x]Sitting               [x]Standing [x]Transfers        [x]Walking [x]Kneeling               [x]Stairs [x]Squatting / bending   [x]ADLs [x]Reaching  [x]Lifting  [x]Housework  []Driving []Job related tasks  []Sports/Recreation []Other:        ASSESSMENT:  Pt. Tolerated therapy today with mild complaints of pain with sit<>stand and significant fatigue with exercises. Deficits persisting in quad control, especially at end range. Pt. Jose Kelle by addition of step up and over with significant hip drop with R LE stance. Pt required 2 AirEx pads to be added to chair for TRX sit<>stand transitions as patient has poor glut/quad facilitation. Pt will require extensive time spent on quad and glut strengthening due to significant weakness. Each exercise takes increased time due to fatigue/weakness. Pt. Will continue to benefit from skilled therapy in order to restore knee mobility and functional strength for normalized gait and stairs. Treatment/Activity Tolerance:  [x] Patient able to complete tx  [] Patient limited by fatigue  [x] Patient limited by pain  [] Patient limited by other medical complications  [] Other:     Prognosis: [x] Good [] Fair  [] Poor    Patient Requires Follow-up: [x] Yes  [] No    Plan for next treatment session:  Post-op TKA exercises, Knee ROM, quad strengthening, hip strengthening      PLAN: See eval. PT 1-2x/week for 8 weeks (pt. Is concerned about high co-pay)  [x] Continue per plan of care [] Alter current plan (see comments)  [] Plan of care initiated [] Hold pending MD visit [] Discharge    Electronically signed by: Tristan Kunz PT, DPT     Note: If patient does not return for scheduled/ recommended follow up visits, this note will serve as a discharge from care along with most recent update on progress.

## 2020-03-04 ENCOUNTER — HOSPITAL ENCOUNTER (OUTPATIENT)
Dept: PHYSICAL THERAPY | Age: 70
Setting detail: THERAPIES SERIES
Discharge: HOME | End: 2020-03-04
Payer: MEDICARE

## 2020-03-04 PROCEDURE — 97530 THERAPEUTIC ACTIVITIES: CPT

## 2020-03-04 PROCEDURE — 97110 THERAPEUTIC EXERCISES: CPT

## 2020-03-04 NOTE — FLOWSHEET NOTE
110         Extension 0 (+)2 0 (-)4            Knee Ext: L R L R L R L R L R   MMT (of 5) 4+ 4+ 4+ 4         Knee Ext (#)   37.9 30.7            Hip Abd:  L R L R L R L R L R   MMT (of 5) 4 4- 4- 3+         Hip Abd (#)   10.7 8.6            LEFS (raw) 50 44          RESTRICTIONS/PRECAUTIONS: 1/28/2020 R TKA    Exercises/Interventions:     Therapeutic Exercises (50176) Resistance / level Sets/sec Reps Notes   Bike ROM 5'     IB - Gastroc  30\" 3    Seated HS stretch  30\" 3    Prone Quad Stretch  30\" 3    Heel Slides  10\" 10    Quad Sets  10\" 10    SAQ 2# 2 10    SLR - Flexion  1# 2 10 3/2 inc weight   SLR - Sidelying Abduction 1# 2 10 3/2 inc weight   LAQ 2# 3 10 3/2 progressed sets   Standing HS Curl 2# 3 10 3/2 progressed sets   Leg Press - SL  30#  40# 2  2 10 R  10 L ^3/2 for glute strengthening    HR/TR    npv   Prone TKE    npv                 Therapeutic Activities (37427)       Cup walking HHA 2 laps  Cueing to decrease circumduction   TKE blue 5\" hold 20 Cueing to decrease hip compensation   Step up and over 4\" 1 15 Max cueing for quad control upon descend off step   Squat to Chair w/ 2 AirEx pads  TRX 1 15 Max cueing for glut activation upon standing, B hip ER to prevent hip ADD/IR collapse           Neuromuscular Re-ed (62585)                                                 Manual Intervention (00997)       Knee PROM Flex/Ext w/ OP       Tib on Femur A/P Glides Grade II/III 3'  Promoting flexion   Tib/Fib Rotation in slight knee flexion       Patella Mobs Grade II/III 2'                       Pt. Education:  -patient educated on diagnosis, prognosis and expectations for rehab  -all patient questions were answered    HEP instruction:  -patient provided with written and illustrated instructions for HEP (see scanned image in ): post-op TKR protocol  - 2/25: HEP HO provided for post-op TKA exercises listed above - went over briefly with demo and performance     Therapeutic Exercise and NMR EXR  [x] Progressing: [] Met: [] Not Met: [] Adjusted  2. Patient will have a decrease in pain to facilitate improvement in movement, function, and ADLs as indicated by Functional Deficits. [] Progressing: [] Met: [] Not Met: [] Adjusted    Long Term Goals: To be achieved in: 8 weeks  1. Disability index score of 10% or less for the LEFS to assist with reaching prior level of function. [] Progressing: [] Met: [] Not Met: [] Adjusted  2. Patient will demonstrate increased R Knee AROM to 0-120 to allow for proper joint functioning as indicated by patients Functional Deficits. [] Progressing: [] Met: [] Not Met: [] Adjusted  3. Patient will demonstrate an increase in Strength to at least 4/5 as well as good proximal hip strength and control to allow for proper functional mobility as indicated by patients Functional Deficits. [] Progressing: [] Met: [] Not Met: [] Adjusted  4. Patient will return to functional activities including ambulation w/o AD or without increased symptoms or restriction. [] Progressing: [] Met: [] Not Met: [] Adjusted  5. Patient will return to reciprocal stair navigation without increased symptoms or restriction. [] Progressing: [] Met: [] Not Met: [] Adjusted    Overall Progression Towards Functional goals/ Treatment Progress Update:  [] Patient is progressing as expected towards functional goals listed. [] Progression is slowed due to complexities/Impairments listed. [] Progression has been slowed due to co-morbidities.   [x] Plan just implemented, too soon to assess goals progression <30days   [] Goals require adjustment due to lack of progress  [] Patient is not progressing as expected and requires additional follow up with physician  [] Other    Persisting Functional Limitations/Impairments:  []Sleeping [x]Sitting               [x]Standing [x]Transfers        [x]Walking [x]Kneeling               [x]Stairs [x]Squatting / bending   [x]ADLs [x]Reaching  [x]Lifting [x]Housework  []Driving []Job related tasks  []Sports/Recreation []Other:        ASSESSMENT:  Pt challenged by current routine especially with squats. Attempted squats this date with ball between knees in order to maintain proper alignment of LE's and with raised bed height for minimal squat depth but pt continues to have pain in patella without relief. Will plan to hold squats at next visit and focus on improving quad strength and hip stability for improved technique with transfers. Pt demonstrates muscle fatigue and decreased endurance with current exercise routine and will continue to benefit from therapy services in order to improve functional mobility and return to performing functional daily tasks without pain and motion restrictions. Treatment/Activity Tolerance:  [x] Patient able to complete tx  [] Patient limited by fatigue  [x] Patient limited by pain  [] Patient limited by other medical complications  [] Other:     Prognosis: [x] Good [] Fair  [] Poor    Patient Requires Follow-up: [x] Yes  [] No    Plan for next treatment session:  Post-op TKA exercises, Knee ROM, quad strengthening, hip strengthening      PLAN: See eval. PT 1-2x/week for 8 weeks (pt. Is concerned about high co-pay)  [x] Continue per plan of care [] Alter current plan (see comments)  [] Plan of care initiated [] Hold pending MD visit [] Discharge    Electronically signed by: Catracho Vance     Note: If patient does not return for scheduled/ recommended follow up visits, this note will serve as a discharge from care along with most recent update on progress.

## 2020-03-05 ENCOUNTER — OFFICE VISIT (OUTPATIENT)
Dept: ORTHOPEDIC SURGERY | Age: 70
End: 2020-03-05

## 2020-03-05 VITALS — WEIGHT: 145 LBS | BODY MASS INDEX: 21.98 KG/M2 | HEIGHT: 68 IN

## 2020-03-05 PROCEDURE — 99024 POSTOP FOLLOW-UP VISIT: CPT | Performed by: PHYSICIAN ASSISTANT

## 2020-03-05 RX ORDER — DIAPER,BRIEF,INFANT-TODD,DISP
EACH MISCELLANEOUS
Qty: 1 TUBE | Refills: 1 | Status: SHIPPED | OUTPATIENT
Start: 2020-03-05 | End: 2020-03-12

## 2020-03-09 ENCOUNTER — HOSPITAL ENCOUNTER (OUTPATIENT)
Dept: PHYSICAL THERAPY | Age: 70
Setting detail: THERAPIES SERIES
Discharge: HOME | End: 2020-03-09
Payer: MEDICARE

## 2020-03-09 PROCEDURE — 97530 THERAPEUTIC ACTIVITIES: CPT

## 2020-03-09 PROCEDURE — 97110 THERAPEUTIC EXERCISES: CPT

## 2020-03-09 NOTE — FLOWSHEET NOTE
[facility] - Outpatient Physical Therapy    Physical Therapy Daily Treatment Note  Date:  3/9/2020    Patient Name:  Yamilet Randhawa    :  1950  MRN: [id number]  Medical/Treatment Diagnosis Information:  · Diagnosis: M17.11 (ICD-10-CM) - Primary osteoarthritis of right knee; R TKA on 2020  · Treatment Diagnosis: Impaired gait, LE ROM, strength, balance  Insurance/Certification information:  PT Insurance Information: [employer]na Medicare ([employer]) $40 COPAY  Physician Information:  Referring Practitioner: Magaly Sullivan  Plan of care signed (Y/N): [x]  Yes []  No     Date of Patient follow up with Physician:   3/5/2020     Progress Report: []  Yes  [x]  No     Date Range for reporting period:  Beginning 2020   Ending     Progress report due (10 Rx/or 30 days whichever is less):      Recertification due (POC duration/ or 90 days whichever is less): 8 weeks    Visit # Insurance Allowable Auth required? Date Range   5 [place] []  Yes  [x]  No      Latex Allergy:  [x]NO      []YES  Preferred Language for Healthcare:   [x]English       []other:    Functional Scale: LEFS: 44 = 45% LOF Date assessed: 2020     Pain level:  2/10     SUBJECTIVE:  Pt reports she is doing okay today. Still having pain each day. States she walked through the mall and took a cane but does not use a cane regularly.      OBJECTIVE:     : knee PROM: -2 extension, 120 flexion w/ heel slides    Functional Testing  Sx Date 2020 Prehab Date 2019 Post-op Re-Eval Date 2020 4 week F/U Date   SEE REEVAL #s 8 week F/U Date 3/24/2020 D/C Date TBD      TUG (sec) 10 13.36      30 second sit to stand (reps) 0 w/o hands  4 w/ hands   0 w/o hands  10 w/ hands      6 minute walk (m) 414 312         Balance:    Narrowed FRANTZ (sec) 10 10      Semi Tandem (sec) 10 10          Tandem (sec) 10 10            SLS (sec) 3 2           Knee AROM L R L R L R L R L R   Flexion 137 125 137 110         Extension 0 (+)2 0 HEP and progression per patient tolerance, in order to prevent re-injury. [] Progressing: [] Met: [] Not Met: [] Adjusted  2. Patient will have a decrease in pain to facilitate improvement in movement, function, and ADLs as indicated by Functional Deficits. [] Progressing: [] Met: [] Not Met: [] Adjusted    Long Term Goals: To be achieved in: 8 weeks  1. Disability index score of 10% or less for the LEFS to assist with reaching prior level of function. [] Progressing: [] Met: [] Not Met: [] Adjusted  2. Patient will demonstrate increased R Knee AROM to 0-120 to allow for proper joint functioning as indicated by patients Functional Deficits. [] Progressing: [] Met: [] Not Met: [] Adjusted  3. Patient will demonstrate an increase in Strength to at least 4/5 as well as good proximal hip strength and control to allow for proper functional mobility as indicated by patients Functional Deficits. [] Progressing: [] Met: [] Not Met: [] Adjusted  4. Patient will return to functional activities including ambulation w/o AD or without increased symptoms or restriction. [] Progressing: [] Met: [] Not Met: [] Adjusted  5. Patient will return to reciprocal stair navigation without increased symptoms or restriction. [] Progressing: [] Met: [] Not Met: [] Adjusted    Overall Progression Towards Functional goals/ Treatment Progress Update:  [] Patient is progressing as expected towards functional goals listed. [] Progression is slowed due to complexities/Impairments listed. [] Progression has been slowed due to co-morbidities.   [x] Plan just implemented, too soon to assess goals progression <30days   [] Goals require adjustment due to lack of progress  [] Patient is not progressing as expected and requires additional follow up with physician  [] Other    Persisting Functional

## 2020-03-12 ENCOUNTER — HOSPITAL ENCOUNTER (OUTPATIENT)
Dept: PHYSICAL THERAPY | Age: 70
Setting detail: THERAPIES SERIES
Discharge: HOME | End: 2020-03-12
Payer: MEDICARE

## 2020-03-12 PROCEDURE — 97140 MANUAL THERAPY 1/> REGIONS: CPT

## 2020-03-12 PROCEDURE — 97110 THERAPEUTIC EXERCISES: CPT

## 2020-03-12 NOTE — FLOWSHEET NOTE
0 (+)2 0 (-)4            Knee Ext: L R L R L R L R L R   MMT (of 5) 4+ 4+ 4+ 4         Knee Ext (#)   37.9 30.7            Hip Abd:  L R L R L R L R L R   MMT (of 5) 4 4- 4- 3+         Hip Abd (#)   10.7 8.6            LEFS (raw) 50 44          RESTRICTIONS/PRECAUTIONS: 1/28/2020 R TKA    Exercises/Interventions:     Therapeutic Exercises (48968) Resistance / level Sets/sec Reps Notes   Bike ROM 5'     IB - Gastroc  30\" 3    Seated HS stretch  30\" 3    Prone Quad Stretch  30\" 3    Heel Slides  10\" 10    Quad Sets  10\" 10    SAQ    SLR - Flexion  1# 2 10 3/2 inc weight   SLR - Sidelying Abduction 1# 2 10 3/2 inc weight   LAQ 3# 3 10 3/9 inc weight   Standing HS Curl 3# 3 10 3/9 inc weight   Leg Press - DL 70# 3 10 Added 3/9 - cueing for LE alignment due to knee valgus   Leg Press - SL  ^3/2 for glute strengthening    HR/TR    Prone TKE  10\" 10                  Therapeutic Activities (36203)       Cup walking  Cueing to decrease circumduction   TKE Cueing to decrease hip compensation   Step up and over Max cueing for quad control upon descend off step   Squat to Chair w/ 2 AirEx pads  Max cueing for glut activation upon standing, B hip ER to prevent hip ADD/IR collapse    LSU Max cueing for quad facilitation and level hips                 Neuromuscular Re-ed (17609)                                                 Manual Intervention (17072)       Knee PROM Flex/Ext w/ OP       Tib on Femur P/A Glides Grade II/III  Promoting ext   Tib/Fib Rotation in slight knee flexion      Patella Mobs Grade II/III     Stick to posterior chain  6'     STM R hamstrings  4'                       Pt. Education:  -patient educated on diagnosis, prognosis and expectations for rehab  -all patient questions were answered    HEP instruction:  -patient provided with written and illustrated instructions for HEP (see scanned image in ): post-op TKR protocol  - 2/25: HEP HO provided for post-op TKA exercises listed above - went over briefly with demo and performance     Therapeutic Exercise and NMR EXR  [x] (54846) Provided verbal/tactile cueing for activities related to strengthening, flexibility, endurance, ROM for improvements in LE / Lumbar: LE, proximal hip, and core control with self care, mobility, lifting, ambulation.  [] (66432) Provided verbal/tactile cueing for activities related to improving balance, coordination, kinesthetic sense, posture, motor skill, proprioception to assist with LE / lumbar: LE, proximal hip, and core control in self care, mobility, lifting, ambulation and eccentric single leg control.   [] (29084) Therapist is in constant attendance of 2 or more patients providing skilled therapy interventions, but not providing any significant amount of measurable one-on-one time to either patient, for improvements in LE / lumbar: LE, proximal hip, and core control in self care, mobility, lifting, ambulation and eccentric single leg control. NMR and Therapeutic Activities:    [x] (34221 or 54006) Provided verbal/tactile cueing for activities related to improving balance, coordination, kinesthetic sense, posture, motor skill, proprioception and motor activation to allow for proper function of LE: / Lumbar core, proximal hip and LE with self care and ADLs  [x] (75942) Gait Re-education- Provided training and instruction to the patient for proper LE, core and proximal hip recruitment and positioning and eccentric body weight control with ambulation re-education including up and down stairs     Home Management Training / Self Care:  [] (09429) Provided self-care/home management training related to activities of daily living and compensatory training, and/or use of adaptive equipment for improvement with: ADLs and compensatory training, meal preparation, safety procedures and instruction in use of adaptive equipment, including bathing, grooming, dressing, personal hygiene, basic household cleaning and chores.      Home Exercise Program:    [x] (52858) Reviewed/Progressed HEP activities related to strengthening, flexibility, endurance, ROM of LE / Lumbar: core, proximal hip and LE for functional self-care, mobility, lifting and ambulation/stair navigation   [] (53133)Reviewed/Progressed HEP activities related to improving balance, coordination, kinesthetic sense, posture, motor skill, proprioception of LE: core, proximal hip and LE for self care, mobility, lifting, and ambulation/stair navigation     Manual Treatments:  PROM / STM / Oscillations-Mobs:  G-I, II, III, IV (PA's, Inf., Post.)  [x] (67005) Provided manual therapy to mobilize LE, proximal hip and/or LS spine soft tissue/joints for the purpose of modulating pain, promoting relaxation,  increasing ROM, reducing/eliminating soft tissue swelling/inflammation/restriction, improving soft tissue extensibility and allowing for proper ROM for normal function with LE / lumbar: self care, mobility, lifting and ambulation. Modalities:  [] (26948) Vasopneumatic compression: Utilized vasopneumatic compression to decrease edema / swelling for the purpose of improving mobility and quad tone / recruitment which will allow for increased overall function including but not limited to self-care, transfers, ambulation, and ascending / descending stairs. Modalities:      Charges:  Timed Code Treatment Minutes: 42   Total Treatment Minutes: 56     [] EVAL - LOW (80089)   [] EVAL - MOD (79510)  [] EVAL - HIGH (97500)  [] RE-EVAL (17180)  [x] TE (04128) x 2     [] Ionto  [] NMR (80458) x      [] Vaso  [x] Manual (06568) x 1      [] Ultrasound  [] TA x 1    [] Mech Traction (60329)  [] Gait Training x     [] ES (un) (36441):   [] Aquatic therapy x   [] Other:   [] Group:     GOALS:   Patient stated goal: decrease knee pain, walk normal  [] Progressing: [] Met: [] Not Met: [] Adjusted    Therapist goals for Patient:   Short Term Goals: To be achieved in: 2 weeks  1.  Independent in HEP and progression per patient tolerance, in order to prevent re-injury. [] Progressing: [] Met: [] Not Met: [] Adjusted  2. Patient will have a decrease in pain to facilitate improvement in movement, function, and ADLs as indicated by Functional Deficits. [] Progressing: [] Met: [] Not Met: [] Adjusted    Long Term Goals: To be achieved in: 8 weeks  1. Disability index score of 10% or less for the LEFS to assist with reaching prior level of function. [] Progressing: [] Met: [] Not Met: [] Adjusted  2. Patient will demonstrate increased R Knee AROM to 0-120 to allow for proper joint functioning as indicated by patients Functional Deficits. [] Progressing: [] Met: [] Not Met: [] Adjusted  3. Patient will demonstrate an increase in Strength to at least 4/5 as well as good proximal hip strength and control to allow for proper functional mobility as indicated by patients Functional Deficits. [] Progressing: [] Met: [] Not Met: [] Adjusted  4. Patient will return to functional activities including ambulation w/o AD or without increased symptoms or restriction. [] Progressing: [] Met: [] Not Met: [] Adjusted  5. Patient will return to reciprocal stair navigation without increased symptoms or restriction. [] Progressing: [] Met: [] Not Met: [] Adjusted    Overall Progression Towards Functional goals/ Treatment Progress Update:  [] Patient is progressing as expected towards functional goals listed. [] Progression is slowed due to complexities/Impairments listed. [] Progression has been slowed due to co-morbidities.   [x] Plan just implemented, too soon to assess goals progression <30days   [] Goals require adjustment due to lack of progress  [] Patient is not progressing as expected and requires additional follow up with physician  [] Other    Persisting Functional Limitations/Impairments:  []Sleeping [x]Sitting               [x]Standing [x]Transfers        [x]Walking [x]Kneeling               [x]Stairs [x]Squatting / bending   [x]ADLs [x]Reaching  [x]Lifting  [x]Housework  []Driving []Job related tasks  []Sports/Recreation []Other:        ASSESSMENT:  Pt with decreased exercises this date and emphasis on manual due to patient's pain levels and discomfort with quad facilitation. Pt educated on importance of continuing strengthening and pain will decrease over time, likely pain is due to bone nerves waking. Pt states understanding. Pt demonstrates muscle fatigue and decreased endurance with current exercise routine and will continue to benefit from therapy services in order to improve functional mobility and return to performing functional daily tasks without pain and motion restrictions. Treatment/Activity Tolerance:  [] Patient able to complete tx  [] Patient limited by fatigue  [x] Patient limited by pain  [] Patient limited by other medical complications  [] Other:     Prognosis: [x] Good [] Fair  [] Poor    Patient Requires Follow-up: [x] Yes  [] No    Plan for next treatment session:  Post-op TKA exercises, Knee ROM, quad strengthening, hip strengthening      PLAN: See eval. PT 1-2x/week for 8 weeks (pt. Is concerned about high co-pay)  [x] Continue per plan of care [] Alter current plan (see comments)  [] Plan of care initiated [] Hold pending MD visit [] Discharge    Electronically signed by: Carly Ortiz PT, DPT     Treatment Assisted by Abby Keyes PTA    Note: If patient does not return for scheduled/ recommended follow up visits, this note will serve as a discharge from care along with most recent update on progress.

## 2020-03-16 ENCOUNTER — HOSPITAL ENCOUNTER (OUTPATIENT)
Dept: PHYSICAL THERAPY | Age: 70
Setting detail: THERAPIES SERIES
Discharge: HOME | End: 2020-03-16
Payer: MEDICARE

## 2020-03-16 PROCEDURE — 97110 THERAPEUTIC EXERCISES: CPT

## 2020-03-16 PROCEDURE — 97530 THERAPEUTIC ACTIVITIES: CPT

## 2020-03-16 NOTE — FLOWSHEET NOTE
[facility] - Outpatient Physical Therapy    Physical Therapy Daily Treatment Note  Date:  3/16/2020    Patient Name:  Elena Nieto    :  1950  MRN: [id number]  Medical/Treatment Diagnosis Information:  · Diagnosis: M17.11 (ICD-10-CM) - Primary osteoarthritis of right knee; R TKA on 2020  · Treatment Diagnosis: Impaired gait, LE ROM, strength, balance  Insurance/Certification information:  PT Insurance Information: [employer] ([employer]) $40 COPAY  Physician Information:  Referring Practitioner: Kassidy Tarango  Plan of care signed (Y/N): [x]  Yes []  No     Date of Patient follow up with Physician:   2020     Progress Report: []  Yes  [x]  No     Date Range for reporting period:  Beginning 2020   Ending     Progress report due (10 Rx/or 30 days whichever is less):  6/15/4006    Recertification due (POC duration/ or 90 days whichever is less): 8 weeks    Visit # Insurance Allowable Auth required? Date Range   7 [place] []  Yes  [x]  No      Latex Allergy:  [x]NO      []YES  Preferred Language for Healthcare:   [x]English       []other:    Functional Scale: LEFS: 44 = 45% LOF Date assessed: 2020     Pain level:  1.5/10     SUBJECTIVE:  Pt reports her hamstrings are feeling better compared to last visit with positive response to manual treatment. Pt reports she thinks her strength is coming back just fine and she is wanting to decrease to 1x/week due to that and her copay is too high. Discussed the need for improved strength, with patient stating she thinks her strength is fine.      OBJECTIVE:     : knee PROM: -2 extension, 120 flexion w/ heel slides    Functional Testing  Sx Date 2020 Prehab Date 2019 Post-op Re-Eval Date 2020 4 week F/U Date   SEE REEVAL #s 8 week F/U Date 3/24/2020 D/C Date TBD      TUG (sec) 10 13.36      30 second sit to stand (reps) 0 w/o hands  4 w/ hands   0 w/o hands  10 w/ hands      6 minute walk (m) [phone]         Balance: expectations for rehab  -all patient questions were answered    HEP instruction:  -patient provided with written and illustrated instructions for HEP (see scanned image in ): post-op TKR protocol  - 2/25: HEP HO provided for post-op TKA exercises listed above - went over briefly with demo and performance     Therapeutic Exercise and NMR EXR  [x] (37509) Provided verbal/tactile cueing for activities related to strengthening, flexibility, endurance, ROM for improvements in LE / Lumbar: LE, proximal hip, and core control with self care, mobility, lifting, ambulation.  [] (91254) Provided verbal/tactile cueing for activities related to improving balance, coordination, kinesthetic sense, posture, motor skill, proprioception to assist with LE / lumbar: LE, proximal hip, and core control in self care, mobility, lifting, ambulation and eccentric single leg control.   [] (63052) Therapist is in constant attendance of 2 or more patients providing skilled therapy interventions, but not providing any significant amount of measurable one-on-one time to either patient, for improvements in LE / lumbar: LE, proximal hip, and core control in self care, mobility, lifting, ambulation and eccentric single leg control.      NMR and Therapeutic Activities:    [x] (29648 or 66934) Provided verbal/tactile cueing for activities related to improving balance, coordination, kinesthetic sense, posture, motor skill, proprioception and motor activation to allow for proper function of LE: / Lumbar core, proximal hip and LE with self care and ADLs  [x] (52282) Gait Re-education- Provided training and instruction to the patient for proper LE, core and proximal hip recruitment and positioning and eccentric body weight control with ambulation re-education including up and down stairs     Home Management Training / Self Care:  [] (67331) Provided self-care/home management training related to activities of daily living and compensatory (un) (39753):   [] Aquatic therapy x   [] Other:   [] Group:     GOALS:   Patient stated goal: decrease knee pain, walk normal  [] Progressing: [] Met: [] Not Met: [] Adjusted    Therapist goals for Patient:   Short Term Goals: To be achieved in: 2 weeks  1. Independent in HEP and progression per patient tolerance, in order to prevent re-injury. [] Progressing: [] Met: [] Not Met: [] Adjusted  2. Patient will have a decrease in pain to facilitate improvement in movement, function, and ADLs as indicated by Functional Deficits. [] Progressing: [] Met: [] Not Met: [] Adjusted    Long Term Goals: To be achieved in: 8 weeks  1. Disability index score of 10% or less for the LEFS to assist with reaching prior level of function. [] Progressing: [] Met: [] Not Met: [] Adjusted  2. Patient will demonstrate increased R Knee AROM to 0-120 to allow for proper joint functioning as indicated by patients Functional Deficits. [] Progressing: [] Met: [] Not Met: [] Adjusted  3. Patient will demonstrate an increase in Strength to at least 4/5 as well as good proximal hip strength and control to allow for proper functional mobility as indicated by patients Functional Deficits. [] Progressing: [] Met: [] Not Met: [] Adjusted  4. Patient will return to functional activities including ambulation w/o AD or without increased symptoms or restriction. [] Progressing: [] Met: [] Not Met: [] Adjusted  5. Patient will return to reciprocal stair navigation without increased symptoms or restriction. [] Progressing: [] Met: [] Not Met: [] Adjusted    Overall Progression Towards Functional goals/ Treatment Progress Update:  [] Patient is progressing as expected towards functional goals listed. [] Progression is slowed due to complexities/Impairments listed. [] Progression has been slowed due to co-morbidities.   [x] Plan just implemented, too soon to assess goals progression <30days   [] Goals require adjustment due to lack of progress  [] Patient is not progressing as expected and requires additional follow up with physician  [] Other    Persisting Functional Limitations/Impairments:  []Sleeping [x]Sitting               [x]Standing [x]Transfers        [x]Walking [x]Kneeling               [x]Stairs [x]Squatting / bending   [x]ADLs [x]Reaching  [x]Lifting  [x]Housework  []Driving []Job related tasks  []Sports/Recreation []Other:        ASSESSMENT:  Pt able to return to increased resistance for exercises this date. Pt educated on importance of continuing strengthening and pain will decrease over time, likely pain is due to bone nerves waking. Pt educated on need for increased strength as she still has an abnormal gait, poor sit<>stand quality, and cannot navigate stairs appropriately. She states she \"thinks strength is fine, it will come over time\". Pt electing to decrease to 1x/week per personal request, against therapist recommendation to maintain 2x/week. Therapist emphasized importance of performing task specific exercises at home to improve quad and hip strength. Pt demonstrates muscle fatigue and decreased endurance with current exercise routine and will continue to benefit from therapy services in order to improve functional mobility and return to performing functional daily tasks without pain and motion restrictions. Treatment/Activity Tolerance:  [x] Patient able to complete tx  [] Patient limited by fatigue  [] Patient limited by pain  [] Patient limited by other medical complications  [] Other:     Prognosis: [x] Good [] Fair  [] Poor    Patient Requires Follow-up: [x] Yes  [] No    Plan for next treatment session:  Post-op TKA exercises, Knee ROM, quad strengthening, hip strengthening      PLAN: See eval. PT 1-2x/week for 8 weeks (pt.  Is concerned about high co-pay)  [x] Continue per plan of care [] Alter current plan (see comments)  [] Plan of care initiated [] Hold pending MD visit [] Discharge    Electronically signed

## 2020-03-19 ENCOUNTER — APPOINTMENT (OUTPATIENT)
Dept: PHYSICAL THERAPY | Age: 70
End: 2020-03-19
Payer: MEDICARE

## 2020-03-23 ENCOUNTER — APPOINTMENT (OUTPATIENT)
Dept: PHYSICAL THERAPY | Age: 70
End: 2020-03-23
Payer: MEDICARE

## 2020-03-26 ENCOUNTER — APPOINTMENT (OUTPATIENT)
Dept: PHYSICAL THERAPY | Age: 70
End: 2020-03-26
Payer: MEDICARE

## 2020-03-30 ENCOUNTER — HOSPITAL ENCOUNTER (OUTPATIENT)
Dept: PHYSICAL THERAPY | Age: 70
Setting detail: THERAPIES SERIES
Discharge: HOME | End: 2020-03-30
Payer: MEDICARE

## 2020-04-08 ENCOUNTER — VIRTUAL VISIT (OUTPATIENT)
Dept: ORTHOPEDIC SURGERY | Age: 70
End: 2020-04-08

## 2020-04-08 VITALS — BODY MASS INDEX: 21.98 KG/M2 | HEIGHT: 68 IN | WEIGHT: 145 LBS

## 2020-04-08 PROCEDURE — 99024 POSTOP FOLLOW-UP VISIT: CPT | Performed by: PHYSICIAN ASSISTANT

## 2020-04-08 NOTE — PROGRESS NOTES
Patient Name: Kiet Humphries  Medical Record Number: [id number]  YOB: 1950  Date of Encounter: 4/8/2020       TELEHEALTH EVALUATION -- Audio/Visual (During [id number] public health emergency)    I have explained to Ms. Kiet Humphries that a Physical Examination is inherently limited by the nature of telemedicine and that this may lead to delayed or inadequate diagnosis. I also explained that she may require a higher level of care if a diagnosis can not be reasonably established with a virtual visit. Ms. Kiet Humphries has provided her Consent to proceed with a Virtual Visit today. Ms. Kiet Humphries was personally present on the video link with me today. This visit was completed virtually using the [employer] platform. Chief Complaint   Patient presents with    Post-Op Check     Right TKA 1/28/20. Knee is doing ok, still has some pain. Total Knee Follow-up  Kiet Humphries is 10 weeks post right total knee arthroplasty follow-up. Patient states she is still having mild pain at times which is usually controlled with Advil. The patient denies fever, wound drainage, increasing redness, pus, increasing pain, increasing swelling. Post op problems reported: none. She is ambulating without assistive device. She is finished working with physical therapy. Patient is still taking Vitamin D supplementation. DVT prophylaxis is completed. The patient's  past medical history, medications, allergies,  family history, social history, and review of systems have been reviewed, and dated and are recorded in the chart under the 'MEDIA\" tab. Physical Exam:   Ms. Kiet Humphries appears well, she is in no apparent distress, she demonstrates appropriate mood & affect. She is alert and oriented to person, place and time. Right Knee: She has no swelling. The incision is clean, dry, intact and nontender and without erythema, induration or warmth. Range of motion from 0 to 120 degrees.  She has no pain with range of

## 2020-05-20 ENCOUNTER — OFFICE VISIT (OUTPATIENT)
Dept: ORTHOPEDIC SURGERY | Age: 70
End: 2020-05-20
Payer: MEDICARE

## 2020-05-20 VITALS — WEIGHT: 145 LBS | HEIGHT: 68 IN | BODY MASS INDEX: 21.98 KG/M2 | TEMPERATURE: 97 F

## 2020-05-20 PROCEDURE — 99213 OFFICE O/P EST LOW 20 MIN: CPT | Performed by: PHYSICIAN ASSISTANT

## 2020-07-13 ENCOUNTER — TELEPHONE (OUTPATIENT)
Dept: ORTHOPEDIC SURGERY | Age: 70
End: 2020-07-13

## 2020-07-13 RX ORDER — AMOXICILLIN 500 MG/1
CAPSULE ORAL
Qty: 4 CAPSULE | Refills: 2 | Status: SHIPPED | OUTPATIENT
Start: 2020-07-13 | End: 2020-10-19 | Stop reason: ALTCHOICE

## 2020-10-12 ASSESSMENT — LIFESTYLE VARIABLES
HOW OFTEN DURING THE LAST YEAR HAVE YOU NEEDED AN ALCOHOLIC DRINK FIRST THING IN THE MORNING TO GET YOURSELF GOING AFTER A NIGHT OF HEAVY DRINKING: NEVER
AUDIT-C TOTAL SCORE: 0
HOW OFTEN DURING THE LAST YEAR HAVE YOU FAILED TO DO WHAT WAS NORMALLY EXPECTED FROM YOU BECAUSE OF DRINKING: 0
HOW OFTEN DO YOU HAVE A DRINK CONTAINING ALCOHOL: 4
HAVE YOU OR SOMEONE ELSE BEEN INJURED AS A RESULT OF YOUR DRINKING: NO
HAVE YOU OR SOMEONE ELSE BEEN INJURED AS A RESULT OF YOUR DRINKING: 0
HOW OFTEN DURING THE LAST YEAR HAVE YOU HAD A FEELING OF GUILT OR REMORSE AFTER DRINKING: NEVER
HOW MANY STANDARD DRINKS CONTAINING ALCOHOL DO YOU HAVE ON A TYPICAL DAY: 0
HOW OFTEN DURING THE LAST YEAR HAVE YOU BEEN UNABLE TO REMEMBER WHAT HAPPENED THE NIGHT BEFORE BECAUSE YOU HAD BEEN DRINKING: 0
AUDIT TOTAL SCORE: 0
HOW OFTEN DO YOU HAVE SIX OR MORE DRINKS ON ONE OCCASION: 0
HOW OFTEN DURING THE LAST YEAR HAVE YOU NEEDED AN ALCOHOLIC DRINK FIRST THING IN THE MORNING TO GET YOURSELF GOING AFTER A NIGHT OF HEAVY DRINKING: 0
HOW OFTEN DO YOU HAVE A DRINK CONTAINING ALCOHOL: FOUR OR MORE TIMES A WEEK
HOW OFTEN DURING THE LAST YEAR HAVE YOU FOUND THAT YOU WERE NOT ABLE TO STOP DRINKING ONCE YOU HAD STARTED: 0
HOW OFTEN DO YOU HAVE SIX OR MORE DRINKS ON ONE OCCASION: NEVER
AUDIT TOTAL SCORE: 4
HOW OFTEN DURING THE LAST YEAR HAVE YOU BEEN UNABLE TO REMEMBER WHAT HAPPENED THE NIGHT BEFORE BECAUSE YOU HAD BEEN DRINKING: NEVER
HOW OFTEN DURING THE LAST YEAR HAVE YOU HAD A FEELING OF GUILT OR REMORSE AFTER DRINKING: 0
HAS A RELATIVE, FRIEND, DOCTOR, OR ANOTHER HEALTH PROFESSIONAL EXPRESSED CONCERN ABOUT YOUR DRINKING OR SUGGESTED YOU CUT DOWN: 0
AUDIT-C TOTAL SCORE: 4
HOW MANY STANDARD DRINKS CONTAINING ALCOHOL DO YOU HAVE ON A TYPICAL DAY: ONE OR TWO
HAS A RELATIVE, FRIEND, DOCTOR, OR ANOTHER HEALTH PROFESSIONAL EXPRESSED CONCERN ABOUT YOUR DRINKING OR SUGGESTED YOU CUT DOWN: NO
HOW OFTEN DURING THE LAST YEAR HAVE YOU FAILED TO DO WHAT WAS NORMALLY EXPECTED FROM YOU BECAUSE OF DRINKING: NEVER
HOW OFTEN DURING THE LAST YEAR HAVE YOU FOUND THAT YOU WERE NOT ABLE TO STOP DRINKING ONCE YOU HAD STARTED: NEVER

## 2020-10-12 ASSESSMENT — PATIENT HEALTH QUESTIONNAIRE - PHQ9
2. FEELING DOWN, DEPRESSED OR HOPELESS: 0
SUM OF ALL RESPONSES TO PHQ QUESTIONS 1-9: 0
SUM OF ALL RESPONSES TO PHQ QUESTIONS 1-9: 0
1. LITTLE INTEREST OR PLEASURE IN DOING THINGS: 0
SUM OF ALL RESPONSES TO PHQ9 QUESTIONS 1 & 2: 0

## 2020-10-19 ENCOUNTER — OFFICE VISIT (OUTPATIENT)
Dept: INTERNAL MEDICINE CLINIC | Age: 70
End: 2020-10-19
Payer: MEDICARE

## 2020-10-19 VITALS
TEMPERATURE: 96.6 F | HEART RATE: 84 BPM | SYSTOLIC BLOOD PRESSURE: 130 MMHG | HEIGHT: 68 IN | WEIGHT: 143.4 LBS | BODY MASS INDEX: 21.73 KG/M2 | DIASTOLIC BLOOD PRESSURE: 80 MMHG

## 2020-10-19 LAB
ALBUMIN SERPL-MCNC: 4.5 G/DL (ref 3.4–5)
ANION GAP SERPL CALCULATED.3IONS-SCNC: 12 MMOL/L (ref 3–16)
BUN BLDV-MCNC: 13 MG/DL (ref 7–20)
CALCIUM SERPL-MCNC: 10.4 MG/DL (ref 8.3–10.6)
CHLORIDE BLD-SCNC: 96 MMOL/L (ref 99–110)
CHOLESTEROL, TOTAL: 186 MG/DL (ref 0–199)
CO2: 26 MMOL/L (ref 21–32)
CREAT SERPL-MCNC: 0.6 MG/DL (ref 0.6–1.2)
GFR AFRICAN AMERICAN: >60
GFR NON-AFRICAN AMERICAN: >60
GLUCOSE BLD-MCNC: 102 MG/DL (ref 70–99)
HDLC SERPL-MCNC: 77 MG/DL (ref 40–60)
LDL CHOLESTEROL CALCULATED: 93 MG/DL
PHOSPHORUS: 3.8 MG/DL (ref 2.5–4.9)
POTASSIUM SERPL-SCNC: 4.3 MMOL/L (ref 3.5–5.1)
SODIUM BLD-SCNC: 134 MMOL/L (ref 136–145)
TRIGL SERPL-MCNC: 82 MG/DL (ref 0–150)
VLDLC SERPL CALC-MCNC: 16 MG/DL

## 2020-10-19 PROCEDURE — G0008 ADMIN INFLUENZA VIRUS VAC: HCPCS | Performed by: INTERNAL MEDICINE

## 2020-10-19 PROCEDURE — G0439 PPPS, SUBSEQ VISIT: HCPCS | Performed by: INTERNAL MEDICINE

## 2020-10-19 PROCEDURE — 90694 VACC AIIV4 NO PRSRV 0.5ML IM: CPT | Performed by: INTERNAL MEDICINE

## 2020-10-19 RX ORDER — IBUPROFEN 600 MG/1
600 TABLET ORAL DAILY PRN
COMMUNITY

## 2020-10-19 RX ORDER — ACETAMINOPHEN 500 MG
650 TABLET ORAL DAILY PRN
Status: ON HOLD | COMMUNITY
End: 2022-09-23 | Stop reason: HOSPADM

## 2020-10-19 NOTE — PATIENT INSTRUCTIONS
Personalized Preventive Plan for Ana Rodriguez - 10/19/2020  Medicare offers a range of preventive health benefits. Some of the tests and screenings are paid in full while other may be subject to a deductible, co-insurance, and/or copay. Some of these benefits include a comprehensive review of your medical history including lifestyle, illnesses that may run in your family, and various assessments and screenings as appropriate. After reviewing your medical record and screening and assessments performed today your provider may have ordered immunizations, labs, imaging, and/or referrals for you. A list of these orders (if applicable) as well as your Preventive Care list are included within your After Visit Summary for your review. Other Preventive Recommendations:    · A preventive eye exam performed by an eye specialist is recommended every 1-2 years to screen for glaucoma; cataracts, macular degeneration, and other eye disorders. · A preventive dental visit is recommended every 6 months. · Try to get at least 150 minutes of exercise per week or 10,000 steps per day on a pedometer . · Order or download the FREE \"Exercise & Physical Activity: Your Everyday Guide\" from [employer]. Call 8-208.640.9101 or search [employer] online. · You need 0916-1924 mg of calcium and 2588-7264 IU of vitamin D per day. It is possible to meet your calcium requirement with diet alone, but a vitamin D supplement is usually necessary to meet this goal.  · When exposed to the sun, use a sunscreen that protects against both UVA and UVB radiation with an SPF of 30 or greater. Reapply every 2 to 3 hours or after sweating, drying off with a towel, or swimming. · Always wear a seat belt when traveling in a car. Always wear a helmet when riding a bicycle or motorcycle.

## 2020-10-19 NOTE — PROGRESS NOTES
Medicare Annual Wellness Visit  Name: Ally Powell Date: 10/19/2020   MRN: [id number] Sex: Female   Age: 72 y.o. Ethnicity: Non-/Non    : 1950 Race: Liya Plummer is here for Medicare AWV    Screenings for behavioral, psychosocial and functional/safety risks, and cognitive dysfunction are all negative except as indicated below. These results, as well as other patient data from the [address] form, are documented in Flowsheets linked to this Encounter. No Known Allergies      Prior to Visit Medications    Medication Sig Taking? Authorizing Provider   ibuprofen (ADVIL;MOTRIN) 600 MG tablet Take 600 mg by mouth daily as needed for Pain Yes Historical Provider, MD   acetaminophen (TYLENOL) 500 MG tablet Take 650 mg by mouth daily as needed for Pain Yes Historical Provider, MD   lovastatin (MEVACOR) 20 MG tablet Take 1 tablet by mouth nightly Yes ERIN Kenney CNP   lisinopril-hydrochlorothiazide (PRINZIDE;ZESTORETIC) 10-12.5 MG per tablet Take 1 tablet by mouth daily Yes ERIN Marcus CNP   Magnesium 500 MG TABS Take 1 tablet by mouth daily Yes Historical Provider, MD   vitamin B-12 (CYANOCOBALAMIN) 500 MCG tablet Take 500 mcg by mouth daily Yes Historical Provider, MD   Calcium Carbonate-Vitamin D (CALCIUM + D PO) Take 1 tablet by mouth daily  Yes Historical Provider, MD   Glucosamine-Chondroit-Vit C-Mn (GLUCOSAMINE CHONDR 1500 COMPLX) CAPS Take 1 tablet by mouth daily  Yes Historical Provider, MD   Multiple Vitamins-Minerals (ONE-A-DAY WOMENS 50 PLUS PO) Take 1 tablet by mouth daily. Yes Historical Provider, MD   Loratadine (CLARITIN PO) Take 1 tablet by mouth daily. Yes Historical Provider, MD   Cranberry 1000 MG CAPS Take 1 tablet by mouth daily  Yes Historical Provider, MD   niacin 250 MG tablet Take 250 mg by mouth daily (with breakfast).  Yes Historical Provider, MD   Polyethylene Glycol 3350 (MIRALAX PO) Take by mouth daily  Yes Historical Provider, MD       Past Medical History:   Diagnosis Date    Arthritis     knee    Hyperlipidemia     Hypertension        Past Surgical History:   Procedure Laterality Date    MIDDLE EAR SURGERY      Done 2 times.  TOTAL KNEE ARTHROPLASTY Right 1/28/2020    RIGHT ROBOTIC ASSISTED TOTAL KNEE ARTHROPLASTY - JEFFERS & NEPHEW ADVANCED performed by David Browne MD at . [facility] [place] [address]         Family History   Problem Relation Age of Onset    Mental Illness Mother     Depression Mother     High Blood Pressure Father     Cancer Paternal Aunt        CareTeam (Including outside providers/suppliers regularly involved in providing care):   Patient Care Team:  Pam Smith MD as PCP - General (Internal Medicine)  Pam Smith MD as PCP - [place] [facility] [facility]    Wt Readings from Last 3 Encounters:   10/19/20 143 lb 6.4 oz (65 kg)   05/20/20 145 lb (65.8 kg)   04/08/20 145 lb (65.8 kg)     Vitals:    10/19/20 0938   BP: 130/80   Pulse: 84   Temp: 96.6 °F (35.9 °C)   TempSrc: Temporal   Weight: 143 lb 6.4 oz (65 kg)   Height: 5' 8\" (1.727 m)     Body mass index is 21.8 kg/m². Based upon direct observation of the patient, evaluation of cognition reveals recent and remote memory intact. GEN: WN/WD, NAD  CV: regular rate and rhythm, no murmurs rubs or gallops  Resp: normal effort, clear auscultation bilaterally  No peripheral edema     Patient's complete Health Risk Assessment and screening values have been reviewed and are found in Flowsheets. The following problems were reviewed today and where indicated follow up appointments were made and/or referrals ordered.     Positive Risk Factor Screenings with Interventions:     Hearing/Vision:  No exam data present  Hearing/Vision  Do you or your family notice any trouble with your hearing?: (!) Yes  Do you have difficulty driving, watching TV, or doing any of your daily activities because of your eyesight?: No  Have you had an eye exam within the past year?: Yes  Hearing/Vision Interventions:  · Hearing concerns:  has hearing aids    Safety:  Safety  Do you have working smoke detectors?: Yes  Have all throw rugs been removed or fastened?: (!) No  Do you have non-slip mats or surfaces in all bathtubs/showers?: Yes  Do all of your stairways have a railing or banister?: Yes  Are your doorways, halls and stairs free of clutter?: Yes  Do you always fasten your seatbelt when you are in a car?: Yes  Safety Interventions:  · Home safety tips provided    Personalized Preventive Plan   Current Health Maintenance Status  Immunization History   Administered Date(s) Administered    Influenza Virus Vaccine 10/01/2015    Influenza, High Dose (Fluzone 65 yrs and older) 09/14/2016, 09/27/2017, 09/26/2018    Influenza, Quadv, adjuvanted, 65 yrs +, IM, PF (Fluad) 10/19/2020    Influenza, Triv, inactivated, subunit, adjuvanted, IM (Fluad 65 yrs and older) 09/26/2019    Pneumococcal Conjugate 13-valent ([id number]) 04/07/2017    Pneumococcal Polysaccharide ([id number]) 04/17/2018    Zoster Recombinant (Shingrix) 06/19/2019, 09/02/2019        Health Maintenance   Topic Date Due    Annual Wellness Visit (AWV)  06/03/2019    Lipid screen  06/04/2020    DTaP/Tdap/Td vaccine (1 - Tdap) 01/01/2099 (Originally 8/28/1970)    Potassium monitoring  01/29/2021    Creatinine monitoring  01/29/2021    Breast cancer screen  12/03/2021    Colon cancer screen colonoscopy  01/25/2028    DEXA (modify frequency per FRAX score)  Completed    Flu vaccine  Completed    Shingles Vaccine  Completed    Pneumococcal 65+ years Vaccine  Completed    Hepatitis A vaccine  Aged Out    Hepatitis B vaccine  Aged Out    Hib vaccine  Aged Out    Meningococcal (ACWY) vaccine  Aged Out    Hepatitis C screen  Discontinued     Recommendations for [employer] Due: see orders and patient instructions/AVS.  .   Recommended screening schedule for the next 5-10 years is provided to the patient in written form: see Patient Instructions/AVS.    Audelia Triana was seen today for medicare awv.     Diagnoses and all orders for this visit:    Routine general medical examination at a health care facility    Need for influenza vaccination  -     INFLUENZA, QUADV, ADJUVANTED, 72 YRS =, IM, PF, PREFILL SYR, 0.5ML (FLUAD)    Essential hypertension, benign  -     Renal Function Panel  -     Lipid Panel

## 2021-02-03 ENCOUNTER — OFFICE VISIT (OUTPATIENT)
Dept: ORTHOPEDIC SURGERY | Age: 71
End: 2021-02-03

## 2021-02-03 VITALS — BODY MASS INDEX: 22.28 KG/M2 | TEMPERATURE: 97.8 F | HEIGHT: 68 IN | WEIGHT: 147 LBS

## 2021-02-03 DIAGNOSIS — M17.11 PRIMARY OSTEOARTHRITIS OF RIGHT KNEE: Primary | ICD-10-CM

## 2021-02-03 PROCEDURE — 99213 OFFICE O/P EST LOW 20 MIN: CPT | Performed by: PHYSICIAN ASSISTANT

## 2021-02-03 NOTE — PROGRESS NOTES
Patient Name: Landon Panda Record Number: [id number]  YOB: 1950  Date of Encounter: 2/3/2021     Chief Complaint   Patient presents with    Follow-up     Right knee. S/P TKA; DOS 1/28/20. History of Present Illness:   Ms. Lei Nguyen is here in 1 year follow up regarding her right total knee arthroplasty. Patient feels she is doing well. She still has mild pain on occasion she rates a 1/10. She denies right knee swelling, redness or warmth. She is pleased with her range of motion and strength. The patient's past medical history, medications, allergies, family history, social history, and review of systems have been reviewed, and dated and are recorded in the chart under the 'MEDIA\" tab. Physical Exam:    Ms. Lei Nguyen appears well, she is in no apparent distress, she demonstrates appropriate mood & affect. She is alert and oriented to person, place and time. Temp 97.8 °F (36.6 °C) (Infrared)   Ht 5' 8\" (1.727 m)   Wt 147 lb (66.7 kg)   LMP 02/14/1992   BMI 22.35 kg/m²     On examination of patient's right knee right knee there is no swelling or joint effusion. Patient has 1 tiny bump over the front of the patella which could be scar tissue from a suture. There is no erythema, induration, warmth, drainage or fluctuance to suggest any type of infectious process. Patient has great range of motion with 4+/5 motor strength. Radiology:  X-rays obtained and reviewed in office:   Views: 3 view right knee including AP, lateral and sunrise views  Impression: Patient is status post right total knee arthroplasty. There is no evidence of loosening or hardware failure. There are no fractures. There are no lytic or blastic lesions.     Orders:  Orders Placed This Encounter   Procedures    XR KNEE RIGHT (3 VIEWS)       Impression:   Diagnosis Orders   1. 1/28/20 RIGHT TKA  XR KNEE RIGHT (3 VIEWS)       Treatment Plan:    Patient is doing very well 1 year status post right total knee arthroplasty. She is still having very minimal occasional right knee pain that she rates a 1/10. Overall, she feels the knee is doing very well. She is not using any assistive device for ambulation. She is advised to stay active and continue home exercises. Patient will continue to be very cautious during inclement weather. She no longer requires antibiotic prophylaxis. Patient was advised to return in 2 years for repeat imaging to ensure there is no hardware loosening or other concerns. Patient is given strict instructions to return before that time if she has any problems or concerns. Almita Abbott was informed of the results of any imaging. We discussed treatment options and a time was given to answer questions. A plan was proposed and Almita Scar understand and accepts this course of care. Electronically signed by Ravin Tracy PA-C on 1/5/2846  Board Certified [facility]    Please note that portions of this note were completed with a voice recognition program.  Efforts were made to edit the dictations but occasionally words are mis-transcribed.

## 2021-02-17 ENCOUNTER — IMMUNIZATION (OUTPATIENT)
Dept: PRIMARY CARE CLINIC | Age: 71
End: 2021-02-17
Payer: MEDICARE

## 2021-02-17 PROCEDURE — 91301 COVID-19, MODERNA VACCINE 100MCG/0.5ML DOSE: CPT | Performed by: FAMILY MEDICINE

## 2021-02-17 PROCEDURE — 0011A COVID-19, MODERNA VACCINE 100MCG/0.5ML DOSE: CPT | Performed by: FAMILY MEDICINE

## 2021-03-01 ENCOUNTER — TELEPHONE (OUTPATIENT)
Dept: AUDIOLOGY | Age: 71
End: 2021-03-01

## 2021-03-03 ENCOUNTER — PROCEDURE VISIT (OUTPATIENT)
Dept: AUDIOLOGY | Age: 71
End: 2021-03-03
Payer: MEDICARE

## 2021-03-03 DIAGNOSIS — H90.6 MIXED CONDUCTIVE AND SENSORINEURAL HEARING LOSS OF BOTH EARS: Primary | ICD-10-CM

## 2021-03-03 DIAGNOSIS — Z97.4 WEARS HEARING AID IN RIGHT EAR: Primary | ICD-10-CM

## 2021-03-03 PROCEDURE — 92567 TYMPANOMETRY: CPT | Performed by: AUDIOLOGIST

## 2021-03-03 PROCEDURE — 92557 COMPREHENSIVE HEARING TEST: CPT | Performed by: AUDIOLOGIST

## 2021-03-03 PROCEDURE — V5014 HEARING AID REPAIR/MODIFYING: HCPCS | Performed by: AUDIOLOGIST

## 2021-03-03 NOTE — PROGRESS NOTES
Right  replaced. $65.00 charge for hearing aid repair/adjustment. 65.00 was paid in full by patient. No insurance involved. CPT code:     Return on annual basis to monitor hearing levels.

## 2021-03-03 NOTE — PROGRESS NOTES
[facility]  Division of Audiology/Otolaryngology    3/3/2021     PCP: Félix Pérez MD   MRN: [id number]     [facility]    Seen for hearing middle ear evaluation, and hearing device check. Patient presents with longstanding history of debilitating hearing loss. She reports a major ear surgery done on the left side more than 20 years ago. She is an experienced hearing aid user. AUDIOGRAM/IMMITTANCE    Audiogram demonstrates moderate degree of mixed loss, from right ear. Severe degree of mixed loss from left ear. Speech discernment ability was excellent from right, some difficulty on left side, in quiet, at elevated levels. Immittance consistent with normal middle ear function (Type A curve), bilaterally. Essentially absent Ipsilateral acoustic reflexes. COUNSELING      Audiogram results were reviewed with patient.             RECOMMENDATION     Return on annual basis to monitor hearing levels    Electronically signed by Veronique Ortez on 3/3/21 at 12:41 PM.

## 2021-03-08 DIAGNOSIS — E78.5 HYPERLIPIDEMIA, UNSPECIFIED HYPERLIPIDEMIA TYPE: ICD-10-CM

## 2021-03-08 DIAGNOSIS — I10 ESSENTIAL HYPERTENSION, BENIGN: ICD-10-CM

## 2021-03-08 RX ORDER — LISINOPRIL AND HYDROCHLOROTHIAZIDE 12.5; 1 MG/1; MG/1
TABLET ORAL
Qty: 90 TABLET | Refills: 3 | Status: SHIPPED | OUTPATIENT
Start: 2021-03-08 | End: 2022-02-07

## 2021-03-08 RX ORDER — LOVASTATIN 20 MG/1
20 TABLET ORAL NIGHTLY
Qty: 90 TABLET | Refills: 3 | Status: SHIPPED | OUTPATIENT
Start: 2021-03-08 | End: 2022-02-07

## 2021-03-17 ENCOUNTER — IMMUNIZATION (OUTPATIENT)
Dept: PRIMARY CARE CLINIC | Age: 71
End: 2021-03-17
Payer: MEDICARE

## 2021-03-17 PROCEDURE — 0012A COVID-19, MODERNA VACCINE 100MCG/0.5ML DOSE: CPT | Performed by: FAMILY MEDICINE

## 2021-03-17 PROCEDURE — 91301 COVID-19, MODERNA VACCINE 100MCG/0.5ML DOSE: CPT | Performed by: FAMILY MEDICINE

## 2021-04-12 ENCOUNTER — HOSPITAL ENCOUNTER (OUTPATIENT)
Dept: WOMENS IMAGING | Age: 71
Discharge: HOME | End: 2021-04-12
Payer: MEDICARE

## 2021-04-12 DIAGNOSIS — Z12.31 VISIT FOR SCREENING MAMMOGRAM: ICD-10-CM

## 2021-04-12 PROCEDURE — 77063 BREAST TOMOSYNTHESIS BI: CPT

## 2021-06-02 ENCOUNTER — PROCEDURE VISIT (OUTPATIENT)
Dept: AUDIOLOGY | Age: 71
End: 2021-06-02

## 2021-06-02 DIAGNOSIS — Z97.4 WEARS HEARING AID IN LEFT EAR: Primary | ICD-10-CM

## 2021-06-02 NOTE — PROGRESS NOTES
Established user of amplification. Both hearing aids were dropped off for re-furbishing. Has worn current devices for over 5 years. Aids were thoroughly cleaned and examined and cleaned. Patient paid $35 following today's service. She will followup for new impression of right ear.

## 2021-06-25 ENCOUNTER — PROCEDURE VISIT (OUTPATIENT)
Dept: AUDIOLOGY | Age: 71
End: 2021-06-25

## 2021-06-25 DIAGNOSIS — H90.3 SENSORINEURAL HEARING LOSS (SNHL) OF BOTH EARS: Primary | ICD-10-CM

## 2021-06-25 PROCEDURE — V5275 EAR IMPRESSION: HCPCS | Performed by: AUDIOLOGIST

## 2021-06-25 NOTE — PROGRESS NOTES
Patient is longtime user of amplification. Was seen today to obtain impressions for new earmolds.    paid 30.00 per ear for impression services, totaling $60.00    She will return for fitting of earmolds which will be $75.00/per ear totaling $150

## 2021-08-02 ENCOUNTER — PROCEDURE VISIT (OUTPATIENT)
Dept: AUDIOLOGY | Age: 71
End: 2021-08-02

## 2021-08-02 DIAGNOSIS — H90.3 SENSORINEURAL HEARING LOSS (SNHL) OF BOTH EARS: Primary | ICD-10-CM

## 2021-08-02 PROCEDURE — V5264 EAR MOLD/INSERT: HCPCS | Performed by: AUDIOLOGIST

## 2021-08-02 NOTE — PROGRESS NOTES
Fitting of bilateral earmolds. Patient reported better hearing after insertion of earmolds. Patient will return should any adjustments be needed.    $150.00 paid for today's services. Follow up in 6 months for repeat audio and verification measures.

## 2021-08-18 ENCOUNTER — TELEPHONE (OUTPATIENT)
Dept: ENT CLINIC | Age: 71
End: 2021-08-18

## 2021-09-17 ENCOUNTER — OFFICE VISIT (OUTPATIENT)
Dept: ORTHOPEDIC SURGERY | Age: 71
End: 2021-09-17
Payer: MEDICARE

## 2021-09-17 VITALS — WEIGHT: 145.4 LBS | HEIGHT: 68 IN | BODY MASS INDEX: 22.04 KG/M2

## 2021-09-17 DIAGNOSIS — S83.91XA SPRAIN OF RIGHT KNEE, UNSPECIFIED LIGAMENT, INITIAL ENCOUNTER: Primary | ICD-10-CM

## 2021-09-17 DIAGNOSIS — M17.11 PRIMARY OSTEOARTHRITIS OF RIGHT KNEE: ICD-10-CM

## 2021-09-17 PROCEDURE — 99213 OFFICE O/P EST LOW 20 MIN: CPT | Performed by: PHYSICIAN ASSISTANT

## 2021-09-17 NOTE — PROGRESS NOTES
Patient Name: Lyndsey Jacques Record Number: [id number]  YOB: 1950  Date of Encounter: 9/17/2021     Chief Complaint   Patient presents with    Follow-up     right TKA, DOS - 1/28/20, pt slipped on step going in pool a month ago       History of Present Illness:   Ms. Kannan Velasquez is here in follow up regarding her right knee. Patient is almost 2 years status post right total knee arthroplasty on 1/28/2020. Patient states 1 month ago she was getting into a swimming pool holding several items when she slipped and hyperflexed her right knee. Patient states she has continued having lateral knee pain with certain steps. Pain goes up to a 5/10. The patient's past medical history, medications, allergies, family history, social history, and review of systems have been reviewed, and dated and are recorded in the chart under the 'MEDIA\" tab. Physical Exam:    Ms. Kannan Velasquez appears well, she is in no apparent distress, she demonstrates appropriate mood & affect. She is alert and oriented to person, place and time. Ht 5' 8\" (1.727 m)   Wt 145 lb 6.4 oz (66 kg)   LMP 02/14/1992   BMI 22.11 kg/m²     On examination of patient's right knee there is no swelling or joint effusion. Patient denies tenderness on palpation of the right knee. She has great range of motion from 0 to 120 degrees. Strength is 4+/5. There is no instability of the right knee. Radiology:  X-rays obtained and reviewed in office:   Views: 3 view right knee including AP, lateral and sunrise views  Impression: Patient is status post right total knee arthroplasty. There is no evidence of loosening or hardware failure. There are no fractures    Impression:   Diagnosis Orders   1. Sprain of right knee, unspecified ligament, initial encounter     2. 1/28/20 RIGHT TKA  XR KNEE RIGHT (3 VIEWS)       Treatment Plan:    Patient hyperflexed her right knee 1 month ago while getting into a swimming pool.   She is almost 2 years status post right total knee arthroplasty. X-rays show no evidence of fracture or hardware loosening or failure. Patient denies tenderness on palpation of the right knee. She has great range of motion and strength. She states pain only occurs with certain movements of the right knee. She is offered physical therapy but declines. She will continue with home exercises and stretches. She will continue to rest, ice and elevate as needed. She will avoid any high impact activities. Patient is advised to follow-up in 4 weeks or before that time with any concerns. If pain continues we will again consider formal physical therapy. Patient may require bone scan to rule out microinstability of her hardware. Avedavey Rivera was informed of the results of any imaging. We discussed treatment options and a time was given to answer questions. A plan was proposed and Yris Rivera understand and accepts this course of care. Electronically signed by Mahesh Marroquin PA-C on 7/19/6007  Board Certified [facility]    Please note that portions of this note were completed with a voice recognition program.  Efforts were made to edit the dictations but occasionally words are mis-transcribed.

## 2021-10-11 ENCOUNTER — OFFICE VISIT (OUTPATIENT)
Dept: INTERNAL MEDICINE CLINIC | Age: 71
End: 2021-10-11
Payer: MEDICARE

## 2021-10-11 VITALS
BODY MASS INDEX: 21.44 KG/M2 | WEIGHT: 141 LBS | SYSTOLIC BLOOD PRESSURE: 128 MMHG | DIASTOLIC BLOOD PRESSURE: 80 MMHG | HEART RATE: 76 BPM | OXYGEN SATURATION: 99 %

## 2021-10-11 DIAGNOSIS — Z23 NEED FOR INFLUENZA VACCINATION: ICD-10-CM

## 2021-10-11 DIAGNOSIS — I10 ESSENTIAL HYPERTENSION, BENIGN: ICD-10-CM

## 2021-10-11 DIAGNOSIS — Z01.818 PREOP EXAM FOR INTERNAL MEDICINE: Primary | ICD-10-CM

## 2021-10-11 DIAGNOSIS — E78.5 HYPERLIPIDEMIA, UNSPECIFIED HYPERLIPIDEMIA TYPE: ICD-10-CM

## 2021-10-11 PROCEDURE — 90694 VACC AIIV4 NO PRSRV 0.5ML IM: CPT | Performed by: NURSE PRACTITIONER

## 2021-10-11 PROCEDURE — 99214 OFFICE O/P EST MOD 30 MIN: CPT | Performed by: NURSE PRACTITIONER

## 2021-10-11 PROCEDURE — G0008 ADMIN INFLUENZA VIRUS VAC: HCPCS | Performed by: NURSE PRACTITIONER

## 2021-10-11 SDOH — ECONOMIC STABILITY: FOOD INSECURITY: WITHIN THE PAST 12 MONTHS, THE FOOD YOU BOUGHT JUST DIDN'T LAST AND YOU DIDN'T HAVE MONEY TO GET MORE.: NEVER TRUE

## 2021-10-11 SDOH — ECONOMIC STABILITY: FOOD INSECURITY: WITHIN THE PAST 12 MONTHS, YOU WORRIED THAT YOUR FOOD WOULD RUN OUT BEFORE YOU GOT MONEY TO BUY MORE.: NEVER TRUE

## 2021-10-11 ASSESSMENT — PATIENT HEALTH QUESTIONNAIRE - PHQ9
SUM OF ALL RESPONSES TO PHQ QUESTIONS 1-9: 0
2. FEELING DOWN, DEPRESSED OR HOPELESS: 0
SUM OF ALL RESPONSES TO PHQ QUESTIONS 1-9: 0
SUM OF ALL RESPONSES TO PHQ9 QUESTIONS 1 & 2: 0
1. LITTLE INTEREST OR PLEASURE IN DOING THINGS: 0
SUM OF ALL RESPONSES TO PHQ QUESTIONS 1-9: 0

## 2021-10-11 ASSESSMENT — ENCOUNTER SYMPTOMS
WHEEZING: 0
CHEST TIGHTNESS: 0
COUGH: 0
SHORTNESS OF BREATH: 0

## 2021-10-11 ASSESSMENT — SOCIAL DETERMINANTS OF HEALTH (SDOH): HOW HARD IS IT FOR YOU TO PAY FOR THE VERY BASICS LIKE FOOD, HOUSING, MEDICAL CARE, AND HEATING?: NOT HARD AT ALL

## 2021-10-11 NOTE — PROGRESS NOTES
10/11/2021    This is a 80 y.o. female   Chief Complaint   Patient presents with    Pre-op Exam     Cataracts 10/26 and 1/9  CEI     Other     FLU SHOT TODAY - ordered     HPI     Grant Delgadillo is a 80 y.o.  female who comes for a preoperative exam.  She  is referred by Dr. Pam Camacho for perioperative risk determination for upcoming surgery for cataract extraction by phacoemulsification with intraocular lens implant on left eye on 11/9/2021 and right eye on 10/26/2021. Will not need to stop any medications. Denies any previous complications with anesthesia, planing for topical / MAC. Grant Delgadillo returns for follow up of HTN and HLD. Patient has been taking Her medications as prescribed. Patient's blood pressure is controlled. Pt denies medication s/e. Past Medical History:   Diagnosis Date    Arthritis     knee    Hyperlipidemia     Hypertension        Past Surgical History:   Procedure Laterality Date    MIDDLE EAR SURGERY      Done 2 times.  TOTAL KNEE ARTHROPLASTY Right 1/28/2020    RIGHT ROBOTIC ASSISTED TOTAL KNEE ARTHROPLASTY - JEFFERS & NEPHEW ADVANCED performed by Kelton Wong MD at [address] [place] [address] History     Socioeconomic History    Marital status:      Spouse name: Not on file    Number of children: Not on file    Years of education: Not on file    Highest education level: Not on file   Occupational History    Not on file   Tobacco Use    Smoking status: Never Smoker    Smokeless tobacco: Never Used   Vaping Use    Vaping Use: Never used   Substance and Sexual Activity    Alcohol use:  Yes     Alcohol/week: 14.0 standard drinks     Types: 14 Glasses of wine per week     Comment: Couple of drinks nightly     Drug use: Never    Sexual activity: Not Currently     Partners: Male     Comment:    Other Topics Concern    Not on file   Social History Narrative    Not on file     Social Determinants of Health     Financial Resource Strain: Low Risk     Difficulty of Paying Living Expenses: Not hard at all   Food Insecurity: No Food Insecurity    Worried About Running Out of Food in the Last Year: Never true    Azul of Food in the Last Year: Never true   Transportation Needs:     Lack of Transportation (Medical):      Lack of Transportation (Non-Medical):    Physical Activity:     Days of Exercise per Week:     Minutes of Exercise per Session:    Stress:     Feeling of Stress :    Social Connections:     Frequency of Communication with Friends and Family:     Frequency of Social Gatherings with Friends and Family:     Attends Zoroastrian Services:     Active Member of Clubs or Organizations:     Attends Club or Organization Meetings:     Marital Status:    Intimate Partner Violence:     Fear of Current or Ex-Partner:     Emotionally Abused:     Physically Abused:     Sexually Abused:        Family History   Problem Relation Age of Onset    Mental Illness Mother     Depression Mother     High Blood Pressure Father     Cancer Paternal Aunt     Breast Cancer Paternal Aunt        Current Outpatient Medications   Medication Sig Dispense Refill    lovastatin (MEVACOR) 20 MG tablet Take 1 tablet by mouth nightly 90 tablet 3    lisinopril-hydroCHLOROthiazide (PRINZIDE;ZESTORETIC) 10-12.5 MG per tablet Take 1 tablet by mouth once daily 90 tablet 3    ibuprofen (ADVIL;MOTRIN) 600 MG tablet Take 600 mg by mouth daily as needed for Pain      acetaminophen (TYLENOL) 500 MG tablet Take 650 mg by mouth daily as needed for Pain      Magnesium 500 MG TABS Take 1 tablet by mouth daily      vitamin B-12 (CYANOCOBALAMIN) 500 MCG tablet Take 500 mcg by mouth daily      Calcium Carbonate-Vitamin D (CALCIUM + D PO) Take 1 tablet by mouth daily       Glucosamine-Chondroit-Vit C-Mn (GLUCOSAMINE CHONDR 1500 COMPLX) CAPS Take 1 tablet by mouth daily       Multiple Vitamins-Minerals (ONE-A-DAY WOMENS 50 PLUS PO) Take 1 tablet by mouth daily.  Loratadine (CLARITIN PO) Take 1 tablet by mouth daily.  Cranberry 1000 MG CAPS Take 1 tablet by mouth daily       niacin 250 MG tablet Take 250 mg by mouth daily (with breakfast).  Polyethylene Glycol 3350 (MIRALAX PO) Take by mouth daily        No current facility-administered medications for this visit. No Known Allergies    Office Visit on 10/19/2020   Component Date Value Ref Range Status    Sodium 10/19/2020 134* 136 - 145 mmol/L Final    Potassium 10/19/2020 4.3  3.5 - 5.1 mmol/L Final    Chloride 10/19/2020 96* 99 - 110 mmol/L Final    CO2 10/19/2020 26  21 - 32 mmol/L Final    Anion Gap 10/19/2020 12  3 - 16 Final    Glucose 10/19/2020 102* 70 - 99 mg/dL Final    BUN 10/19/2020 13  7 - 20 mg/dL Final    CREATININE 10/19/2020 0.6  0.6 - 1.2 mg/dL Final    GFR Non- 10/19/2020 >60  >60 Final    Comment: >60 mL/min/1.73m2 EGFR, calc. for ages 25 and older using the  MDRD formula (not corrected for weight), is valid for stable  renal function.  GFR  10/19/2020 >60  >60 Final    Comment: Chronic Kidney Disease: less than 60 ml/min/1.73 sq.m. Kidney Failure: less than 15 ml/min/1.73 sq.m. Results valid for patients 18 years and older.  Calcium 10/19/2020 10.4  8.3 - 10.6 mg/dL Final    Phosphorus 10/19/2020 3.8  2.5 - 4.9 mg/dL Final    Albumin 10/19/2020 4.5  3.4 - 5.0 g/dL Final    Cholesterol, Total 10/19/2020 186  0 - 199 mg/dL Final    Triglycerides 10/19/2020 82  0 - 150 mg/dL Final    HDL 10/19/2020 77* 40 - 60 mg/dL Final    LDL Calculated 10/19/2020 93  <100 mg/dL Final    VLDL Cholesterol Calculated 10/19/2020 16  Not Established mg/dL Final     Review of Systems   Constitutional: Negative for chills, fatigue and fever. Respiratory: Negative for cough, chest tightness, shortness of breath and wheezing. Cardiovascular: Negative for chest pain, palpitations and leg swelling.    Neurological: Negative for dizziness, tremors, light-headedness and headaches. /80   Pulse 76   Wt 141 lb (64 kg)   LMP 02/14/1992   SpO2 99%   BMI 21.44 kg/m²      Physical Exam  Vitals reviewed. Constitutional:       General: She is not in acute distress. Appearance: She is well-developed. She is not ill-appearing or diaphoretic. HENT:      Head: Normocephalic and atraumatic. Cardiovascular:      Rate and Rhythm: Normal rate and regular rhythm. Heart sounds: Normal heart sounds. No murmur heard. Pulmonary:      Effort: Pulmonary effort is normal. No respiratory distress. Breath sounds: Normal breath sounds. No wheezing or rhonchi. Skin:     General: Skin is warm and dry. Neurological:      General: No focal deficit present. Mental Status: She is alert and oriented to person, place, and time. Psychiatric:         Mood and Affect: Mood and affect normal.         Behavior: Behavior normal.       Diagnosis  Assessment and Plan  1. Preop exam for internal medicine  Perioperative risk related to the patient's upcoming surgery is considered acceptable. Pt is cleared for surgery. DVT prophylaxis per surgery team.  Requested work up listed below. Pre-op exam was completed on 10/11/21. 2. Need for influenza vaccination  - INFLUENZA, QUADV, ADJUVANTED, 65 YRS =, IM, PF, PREFILL SYR, 0.5ML (FLUAD)    3. Hyperlipidemia, unspecified hyperlipidemia type  Well controlled, continue current regimen. - Basic Metabolic Panel; Future    4. Essential hypertension, benign  Well controlled, continue current regimen.      - Lipid Panel;  Future    Follow up yearly and prn     Electronically signed by ERIN Perez - CNP on 10/11/2021 at 8:51 AM

## 2021-10-20 DIAGNOSIS — E78.5 HYPERLIPIDEMIA, UNSPECIFIED HYPERLIPIDEMIA TYPE: ICD-10-CM

## 2021-10-20 DIAGNOSIS — I10 ESSENTIAL HYPERTENSION, BENIGN: ICD-10-CM

## 2021-10-20 LAB
ANION GAP SERPL CALCULATED.3IONS-SCNC: 12 MMOL/L (ref 3–16)
BUN BLDV-MCNC: 13 MG/DL (ref 7–20)
CALCIUM SERPL-MCNC: 10.3 MG/DL (ref 8.3–10.6)
CHLORIDE BLD-SCNC: 99 MMOL/L (ref 99–110)
CHOLESTEROL, TOTAL: 183 MG/DL (ref 0–199)
CO2: 25 MMOL/L (ref 21–32)
CREAT SERPL-MCNC: 0.6 MG/DL (ref 0.6–1.2)
GFR AFRICAN AMERICAN: >60
GFR NON-AFRICAN AMERICAN: >60
GLUCOSE BLD-MCNC: 107 MG/DL (ref 70–99)
HDLC SERPL-MCNC: 78 MG/DL (ref 40–60)
LDL CHOLESTEROL CALCULATED: 93 MG/DL
POTASSIUM SERPL-SCNC: 4.5 MMOL/L (ref 3.5–5.1)
SODIUM BLD-SCNC: 136 MMOL/L (ref 136–145)
TRIGL SERPL-MCNC: 60 MG/DL (ref 0–150)
VLDLC SERPL CALC-MCNC: 12 MG/DL

## 2022-02-05 DIAGNOSIS — E78.5 HYPERLIPIDEMIA, UNSPECIFIED HYPERLIPIDEMIA TYPE: ICD-10-CM

## 2022-02-05 DIAGNOSIS — I10 ESSENTIAL HYPERTENSION, BENIGN: ICD-10-CM

## 2022-02-07 RX ORDER — LISINOPRIL AND HYDROCHLOROTHIAZIDE 12.5; 1 MG/1; MG/1
TABLET ORAL
Qty: 90 TABLET | Refills: 1 | Status: SHIPPED | OUTPATIENT
Start: 2022-02-07 | End: 2022-08-08

## 2022-02-07 RX ORDER — LOVASTATIN 20 MG/1
20 TABLET ORAL NIGHTLY
Qty: 90 TABLET | Refills: 1 | Status: SHIPPED | OUTPATIENT
Start: 2022-02-07 | End: 2022-08-08

## 2022-07-05 ENCOUNTER — HOSPITAL ENCOUNTER (OUTPATIENT)
Dept: WOMENS IMAGING | Age: 72
Discharge: HOME | End: 2022-07-05
Payer: MEDICARE

## 2022-07-05 VITALS — BODY MASS INDEX: 21.22 KG/M2 | HEIGHT: 68 IN | WEIGHT: 140 LBS

## 2022-07-05 DIAGNOSIS — Z12.31 BREAST CANCER SCREENING BY MAMMOGRAM: ICD-10-CM

## 2022-07-05 PROCEDURE — 77063 BREAST TOMOSYNTHESIS BI: CPT

## 2022-08-08 DIAGNOSIS — I10 ESSENTIAL HYPERTENSION, BENIGN: ICD-10-CM

## 2022-08-08 DIAGNOSIS — E78.5 HYPERLIPIDEMIA, UNSPECIFIED HYPERLIPIDEMIA TYPE: ICD-10-CM

## 2022-08-08 RX ORDER — LOVASTATIN 20 MG/1
20 TABLET ORAL NIGHTLY
Qty: 90 TABLET | Refills: 0 | Status: SHIPPED | OUTPATIENT
Start: 2022-08-08 | End: 2022-08-22 | Stop reason: SDUPTHER

## 2022-08-08 RX ORDER — LISINOPRIL AND HYDROCHLOROTHIAZIDE 12.5; 1 MG/1; MG/1
TABLET ORAL
Qty: 90 TABLET | Refills: 0 | Status: SHIPPED | OUTPATIENT
Start: 2022-08-08 | End: 2022-08-22 | Stop reason: SDUPTHER

## 2022-08-08 NOTE — TELEPHONE ENCOUNTER
Medication:   Requested Prescriptions     Pending Prescriptions Disp Refills    lisinopril-hydroCHLOROthiazide (PRINZIDE;ZESTORETIC) 10-12.5 MG per tablet [Pharmacy Med Name: Lisinopril-hydroCHLOROthiazide 10-12.5 MG Oral Tablet] 90 tablet 0     Sig: Take 1 tablet by mouth once daily    lovastatin (MEVACOR) 20 MG tablet [Pharmacy Med Name: Lovastatin 20 MG Oral Tablet] 90 tablet 0     Sig: Take 1 tablet by mouth nightly       Last Filled:      Patient Phone Number: [phone] (home)     Last appt: 10/11/2021   Next appt: 8/22/2022

## 2022-08-15 SDOH — HEALTH STABILITY: PHYSICAL HEALTH: ON AVERAGE, HOW MANY DAYS PER WEEK DO YOU ENGAGE IN MODERATE TO STRENUOUS EXERCISE (LIKE A BRISK WALK)?: 2 DAYS

## 2022-08-15 ASSESSMENT — LIFESTYLE VARIABLES
HOW OFTEN DURING THE LAST YEAR HAVE YOU NEEDED AN ALCOHOLIC DRINK FIRST THING IN THE MORNING TO GET YOURSELF GOING AFTER A NIGHT OF HEAVY DRINKING: 0
HOW OFTEN DURING THE LAST YEAR HAVE YOU HAD A FEELING OF GUILT OR REMORSE AFTER DRINKING: 0
HOW OFTEN DURING THE LAST YEAR HAVE YOU NEEDED AN ALCOHOLIC DRINK FIRST THING IN THE MORNING TO GET YOURSELF GOING AFTER A NIGHT OF HEAVY DRINKING: NEVER
HAVE YOU OR SOMEONE ELSE BEEN INJURED AS A RESULT OF YOUR DRINKING: NO
HOW OFTEN DO YOU HAVE SIX OR MORE DRINKS ON ONE OCCASION: 1
HOW OFTEN DURING THE LAST YEAR HAVE YOU FOUND THAT YOU WERE NOT ABLE TO STOP DRINKING ONCE YOU HAD STARTED: 0
HAS A RELATIVE, FRIEND, DOCTOR, OR ANOTHER HEALTH PROFESSIONAL EXPRESSED CONCERN ABOUT YOUR DRINKING OR SUGGESTED YOU CUT DOWN: NO
HAS A RELATIVE, FRIEND, DOCTOR, OR ANOTHER HEALTH PROFESSIONAL EXPRESSED CONCERN ABOUT YOUR DRINKING OR SUGGESTED YOU CUT DOWN: 0
HOW OFTEN DURING THE LAST YEAR HAVE YOU FOUND THAT YOU WERE NOT ABLE TO STOP DRINKING ONCE YOU HAD STARTED: NEVER
HAVE YOU OR SOMEONE ELSE BEEN INJURED AS A RESULT OF YOUR DRINKING: 0
HOW MANY STANDARD DRINKS CONTAINING ALCOHOL DO YOU HAVE ON A TYPICAL DAY: 1 OR 2
HOW OFTEN DO YOU HAVE A DRINK CONTAINING ALCOHOL: 4 OR MORE TIMES A WEEK
HOW OFTEN DURING THE LAST YEAR HAVE YOU HAD A FEELING OF GUILT OR REMORSE AFTER DRINKING: NEVER
HOW OFTEN DURING THE LAST YEAR HAVE YOU BEEN UNABLE TO REMEMBER WHAT HAPPENED THE NIGHT BEFORE BECAUSE YOU HAD BEEN DRINKING: 0
HOW OFTEN DURING THE LAST YEAR HAVE YOU FAILED TO DO WHAT WAS NORMALLY EXPECTED FROM YOU BECAUSE OF DRINKING: NEVER
HOW OFTEN DO YOU HAVE A DRINK CONTAINING ALCOHOL: 5
HOW MANY STANDARD DRINKS CONTAINING ALCOHOL DO YOU HAVE ON A TYPICAL DAY: 1
HOW OFTEN DURING THE LAST YEAR HAVE YOU BEEN UNABLE TO REMEMBER WHAT HAPPENED THE NIGHT BEFORE BECAUSE YOU HAD BEEN DRINKING: NEVER
HOW OFTEN DURING THE LAST YEAR HAVE YOU FAILED TO DO WHAT WAS NORMALLY EXPECTED FROM YOU BECAUSE OF DRINKING: 0

## 2022-08-15 ASSESSMENT — PATIENT HEALTH QUESTIONNAIRE - PHQ9
SUM OF ALL RESPONSES TO PHQ9 QUESTIONS 1 & 2: 1
SUM OF ALL RESPONSES TO PHQ QUESTIONS 1-9: 1
SUM OF ALL RESPONSES TO PHQ QUESTIONS 1-9: 1
2. FEELING DOWN, DEPRESSED OR HOPELESS: 1
SUM OF ALL RESPONSES TO PHQ QUESTIONS 1-9: 1
SUM OF ALL RESPONSES TO PHQ QUESTIONS 1-9: 1
1. LITTLE INTEREST OR PLEASURE IN DOING THINGS: 0

## 2022-08-22 ENCOUNTER — OFFICE VISIT (OUTPATIENT)
Dept: INTERNAL MEDICINE CLINIC | Age: 72
End: 2022-08-22
Payer: MEDICARE

## 2022-08-22 VITALS
HEIGHT: 68 IN | WEIGHT: 131 LBS | BODY MASS INDEX: 19.85 KG/M2 | SYSTOLIC BLOOD PRESSURE: 136 MMHG | DIASTOLIC BLOOD PRESSURE: 84 MMHG | HEART RATE: 84 BPM

## 2022-08-22 DIAGNOSIS — I10 ESSENTIAL HYPERTENSION, BENIGN: ICD-10-CM

## 2022-08-22 DIAGNOSIS — Z00.00 MEDICARE ANNUAL WELLNESS VISIT, SUBSEQUENT: Primary | ICD-10-CM

## 2022-08-22 DIAGNOSIS — E78.5 HYPERLIPIDEMIA, UNSPECIFIED HYPERLIPIDEMIA TYPE: ICD-10-CM

## 2022-08-22 PROCEDURE — G0439 PPPS, SUBSEQ VISIT: HCPCS | Performed by: INTERNAL MEDICINE

## 2022-08-22 PROCEDURE — 1123F ACP DISCUSS/DSCN MKR DOCD: CPT | Performed by: INTERNAL MEDICINE

## 2022-08-22 RX ORDER — LISINOPRIL AND HYDROCHLOROTHIAZIDE 12.5; 1 MG/1; MG/1
TABLET ORAL
Qty: 90 TABLET | Refills: 3 | Status: ON HOLD | OUTPATIENT
Start: 2022-08-22 | End: 2022-09-26 | Stop reason: SINTOL

## 2022-08-22 RX ORDER — LOVASTATIN 20 MG/1
20 TABLET ORAL NIGHTLY
Qty: 90 TABLET | Refills: 3 | Status: SHIPPED | OUTPATIENT
Start: 2022-08-22

## 2022-08-22 NOTE — PROGRESS NOTES
Medicare Annual Wellness Visit    Casie Carmona is here for Medicare AWV, Knee Pain (Pt had a knee replacement in rt knee. Has a sharp pain during the night.), and Anxiety (Pt states she gets some anxiety. She feels lonely and worry about little things. )    Assessment & Plan   Medicare annual wellness visit, subsequent  Essential hypertension, benign  -     lisinopril-hydroCHLOROthiazide (PRINZIDE;ZESTORETIC) 10-12.5 MG per tablet; Take 1 tablet by mouth once daily, Disp-90 tablet, R-3Normal  -     Renal Function Panel  Hyperlipidemia, unspecified hyperlipidemia type  -     lovastatin (MEVACOR) 20 MG tablet; Take 1 tablet by mouth nightly, Disp-90 tablet, R-3Normal  -     Lipid Panel    Recommendations for Preventive Services Due: see orders and patient instructions/AVS.  Recommended screening schedule for the next 5-10 years is provided to the patient in written form: see Patient Instructions/AVS.     No follow-ups on file. Subjective       Patient's complete Health Risk Assessment and screening values have been reviewed and are found in Flowsheets. The following problems were reviewed today and where indicated follow up appointments were made and/or referrals ordered.     Positive Risk Factor Screenings with Interventions:             General Health and ACP:  General  In general, how would you say your health is?: Good  In the past 7 days, have you experienced any of the following: New or Increased Pain, New or Increased Fatigue, Loneliness, Social Isolation, Stress or Anger?: (!) Yes  Select all that apply: (!) Loneliness, Social Isolation  Do you get the social and emotional support that you need?: Yes  Do you have a Living Will?: Yes    Advance Directives       Power of  Living Will ACP-Advance Directive ACP-Power of     Not on File [place] on 01/28/20 Filed Not on File        General Health Risk Interventions:  Loneliness: declines SSRI and therapy but if she changes her mind we can try Carbonate-Vitamin D (CALCIUM + D PO) Take 1 tablet by mouth daily   Historical Provider, MD   Glucosamine-Chondroit-Vit C-Mn (GLUCOSAMINE CHONDR 1500 COMPLX) CAPS Take 1 tablet by mouth daily   Historical Provider, MD   Multiple Vitamins-Minerals (ONE-A-DAY WOMENS 50 PLUS PO) Take 1 tablet by mouth daily. Historical Provider, MD   Loratadine (CLARITIN PO) Take 1 tablet by mouth daily. Historical Provider, MD   Cranberry 1000 MG CAPS Take 1 tablet by mouth daily   Historical Provider, MD   niacin 250 MG tablet Take 250 mg by mouth daily (with breakfast).   Historical Provider, MD   Polyethylene Glycol 3350 (MIRALAX PO) Take by mouth daily   Historical Provider, MD       [facility] (Including outside providers/suppliers regularly involved in providing care):   Patient Care Team:  Anibal Mcgowan MD as PCP - General (Internal Medicine)  Anibal Mcgowan MD as PCP - [facility] Empaneled Provider     Reviewed and updated this visit:  Tobacco  Allergies  Meds  Med Hx  Surg Hx  Soc Hx  Fam Hx

## 2022-08-22 NOTE — PATIENT INSTRUCTIONS
Please remove or fasten any throw rugs  Personalized Preventive Plan for Ro Garcia - 8/22/2022  Medicare offers a range of preventive health benefits. Some of the tests and screenings are paid in full while other may be subject to a deductible, co-insurance, and/or copay. Some of these benefits include a comprehensive review of your medical history including lifestyle, illnesses that may run in your family, and various assessments and screenings as appropriate. After reviewing your medical record and screening and assessments performed today your provider may have ordered immunizations, labs, imaging, and/or referrals for you. A list of these orders (if applicable) as well as your Preventive Care list are included within your After Visit Summary for your review. Other Preventive Recommendations:    A preventive eye exam performed by an eye specialist is recommended every 1-2 years to screen for glaucoma; cataracts, macular degeneration, and other eye disorders. A preventive dental visit is recommended every 6 months. Try to get at least 150 minutes of exercise per week or 10,000 steps per day on a pedometer . Order or download the FREE \"Exercise & Physical Activity: Your Everyday Guide\" from [employer]. Call 2-257.486.4616 or search [employer] online. You need 5909-2968 mg of calcium and 7726-2986 IU of vitamin D per day. It is possible to meet your calcium requirement with diet alone, but a vitamin D supplement is usually necessary to meet this goal.  When exposed to the sun, use a sunscreen that protects against both UVA and UVB radiation with an SPF of 30 or greater. Reapply every 2 to 3 hours or after sweating, drying off with a towel, or swimming. Always wear a seat belt when traveling in a car. Always wear a helmet when riding a bicycle or motorcycle.

## 2022-08-24 LAB
ALBUMIN SERPL-MCNC: 4.9 G/DL (ref 3.4–5)
ANION GAP SERPL CALCULATED.3IONS-SCNC: 9 MMOL/L (ref 3–16)
BUN BLDV-MCNC: 14 MG/DL (ref 7–20)
CALCIUM SERPL-MCNC: 10.2 MG/DL (ref 8.3–10.6)
CHLORIDE BLD-SCNC: 99 MMOL/L (ref 99–110)
CHOLESTEROL, TOTAL: 176 MG/DL (ref 0–199)
CO2: 27 MMOL/L (ref 21–32)
CREAT SERPL-MCNC: 0.6 MG/DL (ref 0.6–1.2)
GFR AFRICAN AMERICAN: >60
GFR NON-AFRICAN AMERICAN: >60
GLUCOSE BLD-MCNC: 107 MG/DL (ref 70–99)
HDLC SERPL-MCNC: 74 MG/DL (ref 40–60)
LDL CHOLESTEROL CALCULATED: 86 MG/DL
PHOSPHORUS: 4.2 MG/DL (ref 2.5–4.9)
POTASSIUM SERPL-SCNC: 4.5 MMOL/L (ref 3.5–5.1)
SODIUM BLD-SCNC: 135 MMOL/L (ref 136–145)
TRIGL SERPL-MCNC: 81 MG/DL (ref 0–150)
VLDLC SERPL CALC-MCNC: 16 MG/DL

## 2022-09-13 ENCOUNTER — TELEPHONE (OUTPATIENT)
Dept: ORTHOPEDIC SURGERY | Age: 72
End: 2022-09-13

## 2022-09-16 ENCOUNTER — PROCEDURE VISIT (OUTPATIENT)
Dept: AUDIOLOGY | Age: 72
End: 2022-09-16

## 2022-09-16 DIAGNOSIS — Z97.4 WEARS HEARING AID IN BOTH EARS: Primary | ICD-10-CM

## 2022-09-16 PROCEDURE — 99999 PR OFFICE/OUTPT VISIT,PROCEDURE ONLY: CPT | Performed by: AUDIOLOGIST

## 2022-09-19 ENCOUNTER — APPOINTMENT (OUTPATIENT)
Dept: CT IMAGING | Age: 72
DRG: 481 | End: 2022-09-19
Payer: MEDICARE

## 2022-09-19 ENCOUNTER — APPOINTMENT (OUTPATIENT)
Dept: GENERAL RADIOLOGY | Age: 72
DRG: 481 | End: 2022-09-19
Payer: MEDICARE

## 2022-09-19 ENCOUNTER — HOSPITAL ENCOUNTER (INPATIENT)
Age: 72
LOS: 7 days | Discharge: NURSING, REHAB OR LONG-TERM CARE | DRG: 481 | End: 2022-09-26
Attending: EMERGENCY MEDICINE | Admitting: INTERNAL MEDICINE
Payer: MEDICARE

## 2022-09-19 DIAGNOSIS — F41.9 ANXIETY DISORDER, UNSPECIFIED TYPE: Primary | ICD-10-CM

## 2022-09-19 DIAGNOSIS — E87.1 HYPONATREMIA: ICD-10-CM

## 2022-09-19 DIAGNOSIS — S72.401A CLOSED FRACTURE OF DISTAL END OF RIGHT FEMUR, UNSPECIFIED FRACTURE MORPHOLOGY, INITIAL ENCOUNTER (HCC): ICD-10-CM

## 2022-09-19 LAB
A/G RATIO: 2 (ref 1.1–2.2)
ALBUMIN SERPL-MCNC: 4.2 G/DL (ref 3.4–5)
ALP BLD-CCNC: 44 U/L (ref 40–129)
ALT SERPL-CCNC: 19 U/L (ref 10–40)
ANION GAP SERPL CALCULATED.3IONS-SCNC: 12 MMOL/L (ref 3–16)
AST SERPL-CCNC: 25 U/L (ref 15–37)
BASOPHILS ABSOLUTE: 0.1 K/UL (ref 0–0.2)
BASOPHILS RELATIVE PERCENT: 0.6 %
BILIRUB SERPL-MCNC: 0.4 MG/DL (ref 0–1)
BUN BLDV-MCNC: 18 MG/DL (ref 7–20)
CALCIUM SERPL-MCNC: 8.9 MG/DL (ref 8.3–10.6)
CHLORIDE BLD-SCNC: 93 MMOL/L (ref 99–110)
CO2: 21 MMOL/L (ref 21–32)
CREAT SERPL-MCNC: <0.5 MG/DL (ref 0.6–1.2)
EOSINOPHILS ABSOLUTE: 0 K/UL (ref 0–0.6)
EOSINOPHILS RELATIVE PERCENT: 0.4 %
GFR AFRICAN AMERICAN: >60
GFR NON-AFRICAN AMERICAN: >60
GLUCOSE BLD-MCNC: 126 MG/DL (ref 70–99)
HCT VFR BLD CALC: 33.4 % (ref 36–48)
HEMOGLOBIN: 11.1 G/DL (ref 12–16)
LYMPHOCYTES ABSOLUTE: 0.9 K/UL (ref 1–5.1)
LYMPHOCYTES RELATIVE PERCENT: 8.9 %
MCH RBC QN AUTO: 30.9 PG (ref 26–34)
MCHC RBC AUTO-ENTMCNC: 33.3 G/DL (ref 31–36)
MCV RBC AUTO: 92.9 FL (ref 80–100)
MONOCYTES ABSOLUTE: 0.4 K/UL (ref 0–1.3)
MONOCYTES RELATIVE PERCENT: 4.4 %
NEUTROPHILS ABSOLUTE: 8.3 K/UL (ref 1.7–7.7)
NEUTROPHILS RELATIVE PERCENT: 85.7 %
PDW BLD-RTO: 12.3 % (ref 12.4–15.4)
PLATELET # BLD: 284 K/UL (ref 135–450)
PMV BLD AUTO: 8.5 FL (ref 5–10.5)
POTASSIUM SERPL-SCNC: 3.7 MMOL/L (ref 3.5–5.1)
RBC # BLD: 3.59 M/UL (ref 4–5.2)
SODIUM BLD-SCNC: 126 MMOL/L (ref 136–145)
TOTAL PROTEIN: 6.3 G/DL (ref 6.4–8.2)
WBC # BLD: 9.7 K/UL (ref 4–11)

## 2022-09-19 PROCEDURE — 6360000002 HC RX W HCPCS: Performed by: EMERGENCY MEDICINE

## 2022-09-19 PROCEDURE — 1200000000 HC SEMI PRIVATE

## 2022-09-19 PROCEDURE — 6360000002 HC RX W HCPCS: Performed by: PHYSICIAN ASSISTANT

## 2022-09-19 PROCEDURE — 96374 THER/PROPH/DIAG INJ IV PUSH: CPT

## 2022-09-19 PROCEDURE — 85025 COMPLETE CBC W/AUTO DIFF WBC: CPT

## 2022-09-19 PROCEDURE — 2580000003 HC RX 258: Performed by: EMERGENCY MEDICINE

## 2022-09-19 PROCEDURE — 99152 MOD SED SAME PHYS/QHP 5/>YRS: CPT

## 2022-09-19 PROCEDURE — 6370000000 HC RX 637 (ALT 250 FOR IP)

## 2022-09-19 PROCEDURE — 73560 X-RAY EXAM OF KNEE 1 OR 2: CPT

## 2022-09-19 PROCEDURE — 6360000002 HC RX W HCPCS: Performed by: NURSE PRACTITIONER

## 2022-09-19 PROCEDURE — 36415 COLL VENOUS BLD VENIPUNCTURE: CPT

## 2022-09-19 PROCEDURE — 96375 TX/PRO/DX INJ NEW DRUG ADDON: CPT

## 2022-09-19 PROCEDURE — 99285 EMERGENCY DEPT VISIT HI MDM: CPT

## 2022-09-19 PROCEDURE — 27502 TREATMENT OF THIGH FRACTURE: CPT

## 2022-09-19 PROCEDURE — 2700000000 HC OXYGEN THERAPY PER DAY

## 2022-09-19 PROCEDURE — 73700 CT LOWER EXTREMITY W/O DYE: CPT

## 2022-09-19 PROCEDURE — 80053 COMPREHEN METABOLIC PANEL: CPT

## 2022-09-19 PROCEDURE — 73502 X-RAY EXAM HIP UNI 2-3 VIEWS: CPT

## 2022-09-19 RX ORDER — ONDANSETRON 2 MG/ML
4 INJECTION INTRAMUSCULAR; INTRAVENOUS EVERY 6 HOURS PRN
Status: DISCONTINUED | OUTPATIENT
Start: 2022-09-19 | End: 2022-09-26 | Stop reason: HOSPADM

## 2022-09-19 RX ORDER — ONDANSETRON 4 MG/1
TABLET, ORALLY DISINTEGRATING ORAL
Status: COMPLETED
Start: 2022-09-19 | End: 2022-09-19

## 2022-09-19 RX ORDER — SODIUM CHLORIDE, SODIUM LACTATE, POTASSIUM CHLORIDE, AND CALCIUM CHLORIDE .6; .31; .03; .02 G/100ML; G/100ML; G/100ML; G/100ML
500 INJECTION, SOLUTION INTRAVENOUS ONCE
Status: COMPLETED | OUTPATIENT
Start: 2022-09-19 | End: 2022-09-19

## 2022-09-19 RX ORDER — HYDROMORPHONE HYDROCHLORIDE 1 MG/ML
0.5 INJECTION, SOLUTION INTRAMUSCULAR; INTRAVENOUS; SUBCUTANEOUS ONCE
Status: COMPLETED | OUTPATIENT
Start: 2022-09-19 | End: 2022-09-19

## 2022-09-19 RX ORDER — PROPOFOL 10 MG/ML
1 INJECTION, EMULSION INTRAVENOUS ONCE
Status: DISCONTINUED | OUTPATIENT
Start: 2022-09-19 | End: 2022-09-20

## 2022-09-19 RX ORDER — FENTANYL CITRATE 50 UG/ML
50 INJECTION, SOLUTION INTRAMUSCULAR; INTRAVENOUS ONCE
Status: COMPLETED | OUTPATIENT
Start: 2022-09-19 | End: 2022-09-19

## 2022-09-19 RX ORDER — HYDROMORPHONE HYDROCHLORIDE 1 MG/ML
0.5 INJECTION, SOLUTION INTRAMUSCULAR; INTRAVENOUS; SUBCUTANEOUS
Status: DISCONTINUED | OUTPATIENT
Start: 2022-09-19 | End: 2022-09-23

## 2022-09-19 RX ORDER — HYDROMORPHONE HYDROCHLORIDE 1 MG/ML
1 INJECTION, SOLUTION INTRAMUSCULAR; INTRAVENOUS; SUBCUTANEOUS
Status: DISCONTINUED | OUTPATIENT
Start: 2022-09-19 | End: 2022-09-23

## 2022-09-19 RX ORDER — ONDANSETRON 4 MG/1
4 TABLET, ORALLY DISINTEGRATING ORAL EVERY 8 HOURS PRN
Status: DISCONTINUED | OUTPATIENT
Start: 2022-09-19 | End: 2022-09-26 | Stop reason: HOSPADM

## 2022-09-19 RX ADMIN — ONDANSETRON 4 MG: 4 TABLET, ORALLY DISINTEGRATING ORAL at 23:40

## 2022-09-19 RX ADMIN — PROPOFOL 30 MG: 10 INJECTION, EMULSION INTRAVENOUS at 22:24

## 2022-09-19 RX ADMIN — PROPOFOL 70 MG: 10 INJECTION, EMULSION INTRAVENOUS at 22:22

## 2022-09-19 RX ADMIN — HYDROMORPHONE HYDROCHLORIDE 1 MG: 1 INJECTION, SOLUTION INTRAMUSCULAR; INTRAVENOUS; SUBCUTANEOUS at 23:31

## 2022-09-19 RX ADMIN — SODIUM CHLORIDE, POTASSIUM CHLORIDE, SODIUM LACTATE AND CALCIUM CHLORIDE 500 ML: 600; 310; 30; 20 INJECTION, SOLUTION INTRAVENOUS at 22:40

## 2022-09-19 RX ADMIN — HYDROMORPHONE HYDROCHLORIDE 0.5 MG: 1 INJECTION, SOLUTION INTRAMUSCULAR; INTRAVENOUS; SUBCUTANEOUS at 22:15

## 2022-09-19 RX ADMIN — FENTANYL CITRATE 50 MCG: 50 INJECTION, SOLUTION INTRAMUSCULAR; INTRAVENOUS at 20:19

## 2022-09-19 ASSESSMENT — ENCOUNTER SYMPTOMS
ABDOMINAL PAIN: 0
CHEST TIGHTNESS: 0
SHORTNESS OF BREATH: 0
DIARRHEA: 0
NAUSEA: 0
VOMITING: 0

## 2022-09-19 ASSESSMENT — PAIN SCALES - GENERAL
PAINLEVEL_OUTOF10: 8
PAINLEVEL_OUTOF10: 10
PAINLEVEL_OUTOF10: 9
PAINLEVEL_OUTOF10: 8

## 2022-09-19 ASSESSMENT — PAIN DESCRIPTION - LOCATION
LOCATION: LEG;HIP
LOCATION: KNEE;OTHER (COMMENT)
LOCATION: LEG
LOCATION: LEG

## 2022-09-19 ASSESSMENT — PAIN DESCRIPTION - DESCRIPTORS
DESCRIPTORS: DISCOMFORT
DESCRIPTORS: SHARP
DESCRIPTORS: DISCOMFORT

## 2022-09-19 ASSESSMENT — PAIN DESCRIPTION - PAIN TYPE
TYPE: ACUTE PAIN
TYPE: ACUTE PAIN

## 2022-09-19 ASSESSMENT — PAIN DESCRIPTION - ONSET: ONSET: ON-GOING

## 2022-09-19 ASSESSMENT — PAIN DESCRIPTION - ORIENTATION
ORIENTATION: RIGHT

## 2022-09-19 ASSESSMENT — PAIN - FUNCTIONAL ASSESSMENT
PAIN_FUNCTIONAL_ASSESSMENT: 0-10
PAIN_FUNCTIONAL_ASSESSMENT: PREVENTS OR INTERFERES WITH MANY ACTIVE NOT PASSIVE ACTIVITIES

## 2022-09-19 ASSESSMENT — LIFESTYLE VARIABLES
HOW MANY STANDARD DRINKS CONTAINING ALCOHOL DO YOU HAVE ON A TYPICAL DAY: PATIENT DOES NOT DRINK
HOW OFTEN DO YOU HAVE A DRINK CONTAINING ALCOHOL: NEVER

## 2022-09-19 ASSESSMENT — PAIN DESCRIPTION - FREQUENCY: FREQUENCY: CONTINUOUS

## 2022-09-19 NOTE — ED PROVIDER NOTES
[facility]        Pt Name: Ines Roger  MRN: [id number]  [place] 1950  Date of evaluation: 9/19/2022  Provider: ERIN Diaz - SCOTT  PCP: Terrie Nelson MD  Note Started: 7:30 PM EDT        I have seen and evaluated this patient with my supervising physician Rashel Alberto, [facility]       Chief Complaint   Patient presents with    Knee Injury     Total knee replacement knee landed underneath her as she fell down two stairs. Knee visibly deformed  Pt reports not hitting her head. Pt reports very little pain with no movement, excruciating pain with movement. 10/10        HISTORY OF PRESENT ILLNESS   (Location, Timing/Onset, Context/Setting, Quality, Duration, Modifying Factors, Severity, Associated Signs and Symptoms)  Note limiting factors. Chief Complaint: right leg injury     Ines Roger is a 71 y.o. female who presents to the ER with right leg injury. States that she tripped on the second to last step and her leg was completely behind her. She is unable to stand. or bend her knee. Denies other injury. Pain medication given prior to arrival per EMS. Denies any headache, fever, lightheadedness, dizziness, visual disturbances. No chest pain or pressure. No neck or back pain. No shortness of breath, cough, or congestion. No abdominal pain, nausea, vomiting, diarrhea, constipation, or dysuria. No rash. Nursing Notes were all reviewed and agreed with or any disagreements were addressed in the HPI. REVIEW OF SYSTEMS    (2-9 systems for level 4, 10 or more for level 5)     Review of Systems   Constitutional:  Negative for activity change, chills and fever. Respiratory:  Negative for chest tightness and shortness of breath. Cardiovascular:  Negative for chest pain. Gastrointestinal:  Negative for abdominal pain, diarrhea, nausea and vomiting. Genitourinary:  Negative for dysuria. Musculoskeletal:         Right leg injury/deformity   All other systems reviewed and are negative. Positives and Pertinent negatives as per HPI. Except as noted above in the ROS, all other systems were reviewed and negative. PAST MEDICAL HISTORY     Past Medical History:   Diagnosis Date    Arthritis     knee    Hyperlipidemia     Hypertension          SURGICAL HISTORY     Past Surgical History:   Procedure Laterality Date    MIDDLE EAR SURGERY      Done 2 times. TOTAL KNEE ARTHROPLASTY Right 1/28/2020    RIGHT ROBOTIC ASSISTED TOTAL KNEE ARTHROPLASTY - JEFFERS & NEPHEW ADVANCED performed by Aileen Villalobos MD at [address]       Previous Medications    ACETAMINOPHEN (TYLENOL) 500 MG TABLET    Take 650 mg by mouth daily as needed for Pain    CALCIUM CARBONATE-VITAMIN D (CALCIUM + D PO)    Take 1 tablet by mouth daily     CRANBERRY 1000 MG CAPS    Take 1 tablet by mouth daily     GLUCOSAMINE-CHONDROIT-VIT C-MN (GLUCOSAMINE CHONDR 1500 COMPLX) CAPS    Take 1 tablet by mouth daily     IBUPROFEN (ADVIL;MOTRIN) 600 MG TABLET    Take 600 mg by mouth daily as needed for Pain    LISINOPRIL-HYDROCHLOROTHIAZIDE (PRINZIDE;ZESTORETIC) 10-12.5 MG PER TABLET    Take 1 tablet by mouth once daily    LORATADINE (CLARITIN PO)    Take 1 tablet by mouth daily. LOVASTATIN (MEVACOR) 20 MG TABLET    Take 1 tablet by mouth nightly    MULTIPLE VITAMINS-MINERALS (ONE-A-DAY WOMENS 50 PLUS PO)    Take 1 tablet by mouth daily. NIACIN 250 MG TABLET    Take 250 mg by mouth daily (with breakfast).     POLYETHYLENE GLYCOL 3350 (MIRALAX PO)    Take by mouth daily     VITAMIN B-12 (CYANOCOBALAMIN) 500 MCG TABLET    Take 500 mcg by mouth daily         ALLERGIES     No known allergies    FAMILYHISTORY       Family History   Problem Relation Age of Onset    Mental Illness Mother     Depression Mother     High Blood Pressure Father     Cancer Paternal Aunt     Breast Cancer Paternal Aunt           SOCIAL HISTORY       Social History     Tobacco Use    Smoking status: Never    Smokeless tobacco: Never   Vaping Use    Vaping Use: Never used   Substance Use Topics    Alcohol use: Yes     Alcohol/week: 14.0 standard drinks     Types: 14 Glasses of wine per week     Comment: Couple of drinks nightly     Drug use: Never       SCREENINGS    Jackson Coma Scale  Eye Opening: Spontaneous  Best Verbal Response: Oriented  Best Motor Response: Obeys commands  Jackson Coma Scale Score: 15        PHYSICAL EXAM    (up to 7 for level 4, 8 or more for level 5)     ED Triage Vitals [09/19/22 1908]   BP Temp Temp Source Heart Rate Resp SpO2 Height Weight   (!) 169/89 98.4 °F (36.9 °C) Oral 75 18 96 % -- --       Physical Exam  Vitals and nursing note reviewed. Constitutional:       Appearance: She is well-developed. She is not diaphoretic. HENT:      Head: Normocephalic and atraumatic. Right Ear: External ear normal.      Left Ear: External ear normal.   Eyes:      General:         Right eye: No discharge. Left eye: No discharge. Neck:      Vascular: No JVD. Cardiovascular:      Rate and Rhythm: Normal rate and regular rhythm. Pulses: Normal pulses. Heart sounds: Normal heart sounds. Pulmonary:      Effort: Pulmonary effort is normal. No respiratory distress. Breath sounds: Normal breath sounds. Abdominal:      Palpations: Abdomen is soft. Musculoskeletal:         General: Normal range of motion. Right knee: Deformity present. Legs:    Skin:     General: Skin is warm and dry. Coloration: Skin is not pale. Neurological:      Mental Status: She is alert.    Psychiatric:         Behavior: Behavior normal.       DIAGNOSTIC RESULTS   LABS:    Labs Reviewed   CBC WITH AUTO DIFFERENTIAL - Abnormal; Notable for the following components:       Result Value    RBC 3.59 (*)     Hemoglobin 11.1 (*)     Hematocrit 33.4 (*)     RDW 12.3 (*)     Neutrophils products, implants, devices, and special equipment available: yes      Site/side marked: yes      Immediately prior to procedure a time out was called: yes      Patient identity confirmed:  Verbally with patient, hospital-assigned identification number and arm band  Pre-procedure details:     Distal neurologic exam:  Normal    Distal perfusion: distal pulses strong and brisk capillary refill    Procedure details:     Location:  Leg    Leg location:  R upper leg    Cast type:  Long leg    Supplies:  Elastic bandage, fiberglass and cotton padding    Attestation: Splint applied and adjusted personally by me    Post-procedure details:     Distal neurologic exam:  Normal    Distal perfusion: distal pulses strong and brisk capillary refill      Procedure completion:  Tolerated    Post-procedure imaging: reviewed      CRITICAL CARE TIME       CONSULTS:  None      EMERGENCY DEPARTMENT COURSE and DIFFERENTIAL DIAGNOSIS/MDM:   Vitals:    Vitals:    09/19/22 2219 09/19/22 2224 09/19/22 2227 09/19/22 2230   BP: (!) 176/80 (!) 157/101 (!) 176/93 (!) 166/90   Pulse: 72 72 66 65   Resp: 16 16 16 13   Temp: 99.2 °F (37.3 °C)      TempSrc:       SpO2: 100% 100% 100% 100%   Weight:           Patient was given the following medications:  Medications   lactated ringers bolus (has no administration in time range)   propofol injection 64 mg (30 mg IntraVENous Bolus from Bag 9/19/22 2224)   fentaNYL (SUBLIMAZE) injection 50 mcg (50 mcg IntraVENous Given 9/19/22 2019)   HYDROmorphone HCl PF (DILAUDID) injection 0.5 mg (0.5 mg IntraVENous Given 9/19/22 2215)         Is this patient to be included in the SEP-1 Core Measure due to severe sepsis or septic shock? No   Exclusion criteria - the patient is NOT to be included for SEP-1 Core Measure due to: Infection is not suspected    Briefly, this is a 71year old female that fell down 2 steps. She does have a distal femur fracture.     Labs collected patient given multiple doses of pain medication. She is hyponatremic. I did speak with Dr. Lottie Hardy, orthopedics, he did request CT right femur without contrast, this was ordered. Results pending. This is a preop study. The fracture was reduced by attending physician with my assistance at bedside. Splint was placed by myself. Distal pulse remains strong and intact. Brisk cap refill. Post reduction film ordered. FINAL IMPRESSION      1. Closed fracture of distal end of right femur, unspecified fracture morphology, initial encounter ([facility] [address]    2. Hyponatremia          DISPOSITION/PLAN   DISPOSITION Decision To Admit 09/19/2022 10:33:55 PM      PATIENT REFERRED TO:  No follow-up provider specified.     DISCHARGE MEDICATIONS:  New Prescriptions    No medications on file       DISCONTINUED MEDICATIONS:  Discontinued Medications    No medications on file              (Please note that portions of this note were completed with a voice recognition program.  Efforts were made to edit the dictations but occasionally words are mis-transcribed.)    ERIN Ann CNP (electronically signed)            ERIN Ann CNP  09/19/22 [address], ERIN Weeks CNP  09/19/22 2236

## 2022-09-19 NOTE — PROGRESS NOTES
[facility]  Division of Audiology/Otolaryngology    9/16/2022     PCP: Cat Guerra MD   MRN: [id number]          Erika Alonso was seen today for left ear hearing aid check. PROCEDURES:        Patient noted less benefit with devices than she's previously had. Microphone and  ports were suctioned. She has [employer] devices that is not compatible with software in the office. Patient has had aids for close to 7 years. PATIENT EDUCATION:      - Verbally and visually reviewed importance of consistent use and care and maintenance of devices. - consider new amplification, as current hearing aids are not suffice. Information was verbally shared with patient during appointment. RECOMMENDATIONS:         Continue consistent hearing aid use  Retest hearing as medically indicated and/or sooner if a change in hearing is noted.   Contact audiologist with questions/concerns as needed  Return to trial new hearing aids     Veronique Draper  Audiologist     NO CHARGE

## 2022-09-20 ENCOUNTER — ANESTHESIA EVENT (OUTPATIENT)
Dept: OPERATING ROOM | Age: 72
DRG: 481 | End: 2022-09-20
Payer: MEDICARE

## 2022-09-20 LAB
ANION GAP SERPL CALCULATED.3IONS-SCNC: 8 MMOL/L (ref 3–16)
BUN BLDV-MCNC: 16 MG/DL (ref 7–20)
CALCIUM SERPL-MCNC: 8.7 MG/DL (ref 8.3–10.6)
CHLORIDE BLD-SCNC: 98 MMOL/L (ref 99–110)
CO2: 24 MMOL/L (ref 21–32)
CREAT SERPL-MCNC: <0.5 MG/DL (ref 0.6–1.2)
EKG ATRIAL RATE: 79 BPM
EKG DIAGNOSIS: NORMAL
EKG P AXIS: 58 DEGREES
EKG P-R INTERVAL: 168 MS
EKG Q-T INTERVAL: 420 MS
EKG QRS DURATION: 90 MS
EKG QTC CALCULATION (BAZETT): 481 MS
EKG R AXIS: 34 DEGREES
EKG T AXIS: 51 DEGREES
EKG VENTRICULAR RATE: 79 BPM
GFR AFRICAN AMERICAN: >60
GFR NON-AFRICAN AMERICAN: >60
GLUCOSE BLD-MCNC: 153 MG/DL (ref 70–99)
POTASSIUM REFLEX MAGNESIUM: 4.3 MMOL/L (ref 3.5–5.1)
SODIUM BLD-SCNC: 130 MMOL/L (ref 136–145)

## 2022-09-20 PROCEDURE — APPNB45 APP NON BILLABLE 31-45 MINUTES: Performed by: NURSE PRACTITIONER

## 2022-09-20 PROCEDURE — 80048 BASIC METABOLIC PNL TOTAL CA: CPT

## 2022-09-20 PROCEDURE — 1200000000 HC SEMI PRIVATE

## 2022-09-20 PROCEDURE — 93005 ELECTROCARDIOGRAM TRACING: CPT | Performed by: NURSE PRACTITIONER

## 2022-09-20 PROCEDURE — 94760 N-INVAS EAR/PLS OXIMETRY 1: CPT

## 2022-09-20 PROCEDURE — 6360000002 HC RX W HCPCS: Performed by: PHYSICIAN ASSISTANT

## 2022-09-20 PROCEDURE — 2580000003 HC RX 258: Performed by: PHYSICIAN ASSISTANT

## 2022-09-20 PROCEDURE — 6370000000 HC RX 637 (ALT 250 FOR IP): Performed by: PHYSICIAN ASSISTANT

## 2022-09-20 PROCEDURE — 6370000000 HC RX 637 (ALT 250 FOR IP): Performed by: INTERNAL MEDICINE

## 2022-09-20 PROCEDURE — 36415 COLL VENOUS BLD VENIPUNCTURE: CPT

## 2022-09-20 PROCEDURE — 6370000000 HC RX 637 (ALT 250 FOR IP): Performed by: NURSE PRACTITIONER

## 2022-09-20 PROCEDURE — 93010 ELECTROCARDIOGRAM REPORT: CPT | Performed by: INTERNAL MEDICINE

## 2022-09-20 RX ORDER — ACETAMINOPHEN 325 MG/1
650 TABLET ORAL EVERY 6 HOURS PRN
Status: DISCONTINUED | OUTPATIENT
Start: 2022-09-20 | End: 2022-09-26

## 2022-09-20 RX ORDER — ATORVASTATIN CALCIUM 10 MG/1
10 TABLET, FILM COATED ORAL DAILY
Status: DISCONTINUED | OUTPATIENT
Start: 2022-09-20 | End: 2022-09-26 | Stop reason: HOSPADM

## 2022-09-20 RX ORDER — OXYCODONE HYDROCHLORIDE 5 MG/1
10 TABLET ORAL EVERY 4 HOURS PRN
Status: DISCONTINUED | OUTPATIENT
Start: 2022-09-20 | End: 2022-09-26 | Stop reason: HOSPADM

## 2022-09-20 RX ORDER — LABETALOL HYDROCHLORIDE 5 MG/ML
10 INJECTION, SOLUTION INTRAVENOUS EVERY 6 HOURS PRN
Status: DISCONTINUED | OUTPATIENT
Start: 2022-09-20 | End: 2022-09-26 | Stop reason: HOSPADM

## 2022-09-20 RX ORDER — DIAZEPAM 2 MG/1
2 TABLET ORAL EVERY 6 HOURS PRN
Status: DISCONTINUED | OUTPATIENT
Start: 2022-09-20 | End: 2022-09-20 | Stop reason: SDUPTHER

## 2022-09-20 RX ORDER — ACETAMINOPHEN 500 MG
1000 TABLET ORAL 3 TIMES DAILY
Status: DISCONTINUED | OUTPATIENT
Start: 2022-09-20 | End: 2022-09-26 | Stop reason: HOSPADM

## 2022-09-20 RX ORDER — ACETAMINOPHEN 650 MG/1
650 SUPPOSITORY RECTAL EVERY 6 HOURS PRN
Status: DISCONTINUED | OUTPATIENT
Start: 2022-09-20 | End: 2022-09-26

## 2022-09-20 RX ORDER — POLYETHYLENE GLYCOL 3350 17 G/17G
17 POWDER, FOR SOLUTION ORAL DAILY
Status: DISCONTINUED | OUTPATIENT
Start: 2022-09-20 | End: 2022-09-26 | Stop reason: HOSPADM

## 2022-09-20 RX ORDER — SODIUM CHLORIDE 9 MG/ML
INJECTION, SOLUTION INTRAVENOUS CONTINUOUS
Status: DISCONTINUED | OUTPATIENT
Start: 2022-09-20 | End: 2022-09-24

## 2022-09-20 RX ORDER — SODIUM CHLORIDE 9 MG/ML
INJECTION, SOLUTION INTRAVENOUS PRN
Status: DISCONTINUED | OUTPATIENT
Start: 2022-09-20 | End: 2022-09-26 | Stop reason: HOSPADM

## 2022-09-20 RX ORDER — ENOXAPARIN SODIUM 100 MG/ML
40 INJECTION SUBCUTANEOUS NIGHTLY
Status: DISCONTINUED | OUTPATIENT
Start: 2022-09-20 | End: 2022-09-26 | Stop reason: HOSPADM

## 2022-09-20 RX ORDER — TIZANIDINE 4 MG/1
4 TABLET ORAL EVERY 6 HOURS PRN
Status: DISCONTINUED | OUTPATIENT
Start: 2022-09-20 | End: 2022-09-26 | Stop reason: HOSPADM

## 2022-09-20 RX ORDER — LANOLIN ALCOHOL/MO/W.PET/CERES
500 CREAM (GRAM) TOPICAL DAILY
Status: DISCONTINUED | OUTPATIENT
Start: 2022-09-20 | End: 2022-09-26 | Stop reason: HOSPADM

## 2022-09-20 RX ORDER — OXYCODONE HYDROCHLORIDE 5 MG/1
5 TABLET ORAL EVERY 4 HOURS PRN
Status: DISCONTINUED | OUTPATIENT
Start: 2022-09-20 | End: 2022-09-26 | Stop reason: HOSPADM

## 2022-09-20 RX ORDER — DIAZEPAM 2 MG/1
2 TABLET ORAL EVERY 12 HOURS PRN
Status: DISCONTINUED | OUTPATIENT
Start: 2022-09-20 | End: 2022-09-26 | Stop reason: HOSPADM

## 2022-09-20 RX ORDER — SODIUM CHLORIDE 0.9 % (FLUSH) 0.9 %
10 SYRINGE (ML) INJECTION EVERY 12 HOURS SCHEDULED
Status: DISCONTINUED | OUTPATIENT
Start: 2022-09-20 | End: 2022-09-26 | Stop reason: HOSPADM

## 2022-09-20 RX ORDER — SODIUM CHLORIDE 0.9 % (FLUSH) 0.9 %
10 SYRINGE (ML) INJECTION PRN
Status: DISCONTINUED | OUTPATIENT
Start: 2022-09-20 | End: 2022-09-26 | Stop reason: HOSPADM

## 2022-09-20 RX ADMIN — OXYCODONE 10 MG: 5 TABLET ORAL at 21:42

## 2022-09-20 RX ADMIN — HYDROMORPHONE HYDROCHLORIDE 1 MG: 1 INJECTION, SOLUTION INTRAMUSCULAR; INTRAVENOUS; SUBCUTANEOUS at 06:30

## 2022-09-20 RX ADMIN — ATORVASTATIN CALCIUM 10 MG: 10 TABLET, FILM COATED ORAL at 08:46

## 2022-09-20 RX ADMIN — ACETAMINOPHEN 1000 MG: 500 TABLET ORAL at 13:43

## 2022-09-20 RX ADMIN — HYDROMORPHONE HYDROCHLORIDE 1 MG: 1 INJECTION, SOLUTION INTRAMUSCULAR; INTRAVENOUS; SUBCUTANEOUS at 16:34

## 2022-09-20 RX ADMIN — DIAZEPAM 2 MG: 2 TABLET ORAL at 21:42

## 2022-09-20 RX ADMIN — POLYETHYLENE GLYCOL 3350 17 G: 17 POWDER, FOR SOLUTION ORAL at 21:42

## 2022-09-20 RX ADMIN — SODIUM CHLORIDE: 9 INJECTION, SOLUTION INTRAVENOUS at 13:45

## 2022-09-20 RX ADMIN — HYDROMORPHONE HYDROCHLORIDE 1 MG: 1 INJECTION, SOLUTION INTRAMUSCULAR; INTRAVENOUS; SUBCUTANEOUS at 10:19

## 2022-09-20 RX ADMIN — ENOXAPARIN SODIUM 40 MG: 100 INJECTION SUBCUTANEOUS at 21:42

## 2022-09-20 RX ADMIN — ACETAMINOPHEN 1000 MG: 500 TABLET ORAL at 21:42

## 2022-09-20 RX ADMIN — HYDROMORPHONE HYDROCHLORIDE 1 MG: 1 INJECTION, SOLUTION INTRAMUSCULAR; INTRAVENOUS; SUBCUTANEOUS at 02:45

## 2022-09-20 RX ADMIN — CYANOCOBALAMIN TAB 1000 MCG 500 MCG: 1000 TAB at 08:46

## 2022-09-20 RX ADMIN — SODIUM CHLORIDE: 9 INJECTION, SOLUTION INTRAVENOUS at 01:08

## 2022-09-20 ASSESSMENT — PAIN DESCRIPTION - DESCRIPTORS
DESCRIPTORS: SHARP
DESCRIPTORS: ACHING

## 2022-09-20 ASSESSMENT — PAIN SCALES - GENERAL
PAINLEVEL_OUTOF10: 7
PAINLEVEL_OUTOF10: 3
PAINLEVEL_OUTOF10: 5
PAINLEVEL_OUTOF10: 5
PAINLEVEL_OUTOF10: 7
PAINLEVEL_OUTOF10: 5
PAINLEVEL_OUTOF10: 5

## 2022-09-20 ASSESSMENT — PAIN DESCRIPTION - LOCATION
LOCATION: LEG
LOCATION: BACK;LEG
LOCATION: LEG;BACK
LOCATION: LEG

## 2022-09-20 ASSESSMENT — PAIN - FUNCTIONAL ASSESSMENT
PAIN_FUNCTIONAL_ASSESSMENT: PREVENTS OR INTERFERES SOME ACTIVE ACTIVITIES AND ADLS
PAIN_FUNCTIONAL_ASSESSMENT: PREVENTS OR INTERFERES SOME ACTIVE ACTIVITIES AND ADLS
PAIN_FUNCTIONAL_ASSESSMENT: ACTIVITIES ARE NOT PREVENTED

## 2022-09-20 ASSESSMENT — PAIN DESCRIPTION - ORIENTATION
ORIENTATION: RIGHT
ORIENTATION: LEFT

## 2022-09-20 NOTE — CONSULTS
[facility] Orthopedic Surgery  Consult Note    Patient: Óscar Montesinos  Admit Date: 9/19/2022  Requesting Physician: Sherryle Blanco, MD  Room: [address] [place], [place] [id number]    Chief complaint: RIGHT leg pain    HPI: Óscar Montesinos is a 71 y.o. female who presented to [facility] ER last night with complaints of right knee/thigh pain after she missed a step at home and fell. Unable to ambulate afterwards. Family at bedside. Underwent RIGHT TKA with Dr. Jaguar Bliss on 1/28/20. Has had some ongoing knee pain since that time intermittently. Describes pain in the right leg/thigh of severe intensity and of aching nature since the fall which is relieved by narcotics partially. Denies new numbness/tingling. Imaging review of the right femur via CT scan and plain films demonstrated: displaced comminuted periprosthetic distal femur fracture    Patient lives in a quad level home. Medical History:  Past Medical History:   Diagnosis Date    Arthritis     knee    Hyperlipidemia     Hypertension      Past Surgical History:   Procedure Laterality Date    MIDDLE EAR SURGERY      Done 2 times. TOTAL KNEE ARTHROPLASTY Right 1/28/2020    RIGHT ROBOTIC ASSISTED TOTAL KNEE ARTHROPLASTY - JEFFERS & NEPHEW ADVANCED performed by Dewayne Ng MD at [address] [place] [address] History:    reports that she has never smoked.  She has never used smokeless tobacco.    Family History:        Problem Relation Age of Onset    Mental Illness Mother     Depression Mother     High Blood Pressure Father     Cancer Paternal Aunt     Breast Cancer Paternal Aunt        Medications:  ALL MEDICATIONS HAVE BEEN REVIEWED:  Scheduled:   atorvastatin  10 mg Oral Daily    vitamin B-12  500 mcg Oral Daily    sodium chloride flush  10 mL IntraVENous 2 times per day    enoxaparin  40 mg SubCUTAneous Nightly     Continuous:   sodium chloride      sodium chloride 75 mL/hr at 09/20/22 0108     PRN:sodium chloride flush, sodium chloride, magnesium hydroxide, acetaminophen **OR** acetaminophen, labetalol, ondansetron **OR** ondansetron, HYDROmorphone **OR** HYDROmorphone    Allergies: Allergies   Allergen Reactions    No Known Allergies        Review of Systems:  Constitutional: Negative for fever, chills, fatigue. Skin:  Negative for pruritis, rash  Eyes: Negative for photophobia and visual disturbance. ENT:  Negative for rhinorrhea, epistaxis, sore throat  Respiratory:  Negative for cough and shortness of breath. Cardiovascular: Negative for chest pain. Gastrointestinal: Negative for nausea, vomiting, diarrhea. Genitourinary: Negative for dysuria and difficulty urinating. Neurological: Negative for confusion, dysarthria, tremors, seizures. Psychiatric:  Negative for depression or anxiety  Musculoskeletal:  Positive for right leg pain    Objective:  Vitals:    09/20/22 0315   BP:    Pulse:    Resp: 16   Temp:    SpO2:       Physical Examination:  GENERAL: No apparent distress, well-nourished  SKIN:  Warm and dry  EYES: Nonicteric. ENT: Mucous membranes moist  HEAD: Normocephalic, atraumatic  RESPIRATORY: Resp easy and unlabored  CARDIOVASCULAR: Regular rate and rhythm  GI: Abdomen soft, nontender  NEURO: Awake and alert. No speech defect  PSYCHIATRIC: Appropriate affect; not agitated  MUSCULOSKELETAL:  RIGHT leg  Inspection: On exam there are no ulcerations, rashes or lesions about the right knee/leg. There is pain to palpation of the right femur with associated swelling. Motor: Intact DF/PF on the right  Sensation: Grossly intact to light touch throughout the right lower extremity in all nerve distributions. Vascular:  2+ right DP pulse.     Labs reviewed:  Recent Labs     09/19/22 2101   WBC 9.7   HGB 11.1*   HCT 33.4*        Recent Labs     09/19/22 2101 09/20/22  0452   * 130*   K 3.7 4.3   CL 93* 98*   CO2 21 24   BUN 18 16   CREATININE <0.5* <0.5*   GLUCOSE 126* 153*   CALCIUM 8.9 8.7     No results for input(s): INR, PROTIME in the last 72 hours. Lab Results   Component Value Date    COLORU YELLOW 01/17/2020    CLARITYU Clear 01/17/2020    PHUR 8.0 01/17/2020    GLUCOSEU Negative 01/17/2020    BLOODU Negative 01/17/2020    LEUKOCYTESUR Negative 01/17/2020    BILIRUBINUR Negative 01/17/2020    UROBILINOGEN 0.2 01/17/2020       Imaging:  CT FEMUR RIGHT WO CONTRAST   Preliminary Result   1. Comminuted periprosthetic distal femur fracture, with extension through   the lateral femoral condyle. Associated lipohemarthrosis with additional   hemorrhage and edema in the vastus intermedius muscle. XR KNEE RIGHT (1-2 VIEWS)   Final Result   Mild improved angulation of the comminuted displaced fracture of the distal   femoral metadiaphysis. XR HIP 2-3 VW W PELVIS RIGHT   Final Result      Osteoarthritic change of the right hip. No evidence of fracture or   malalignment. XR KNEE RIGHT (1-2 VIEWS)   Final Result      Comminuted fracture of the distal femoral shaft above a uninvolved total knee   arthroplasty. The fracture is displaced and angulated anteriorly. IMPRESSION:  RIGHT comminuted and displaced periprosthetic distal femur fracture  RIGHT hip osteoarthritis  S/p RIGHT TKA 2020 - Dr. Octavio Garcia Problem:    Closed fracture of distal end of right femur, unspecified fracture morphology, initial encounter [facility])  Resolved Problems:    * No resolved hospital problems. *      RECOMMENDATIONS:  We discussed both operative and non-operative treatments with our recommendation being for operative fixation. After discussion of risks and benefits, the patient agreed to proceed with surgery. - Schedule for open reduction and internal fixation of the right distal femur with Dr. Maia Bowman after midnight; OK to eat today  - NWB RLE  - Pain control  - DVT prophylaxis: Hold anti-coags on day of surgery.   - Appreciate pre-op clearance from medicine team  - Verify surgical consent  - Pre op orders placed including tomorrow AM labs  - Dispo: await post-op therapy eval    Patient denies tobacco use. I have reviewed imaging and plan with Dr. Patrick Flowers.       Diana Toth, APRN - CNP  9/20/2022  6:58 AM

## 2022-09-20 NOTE — ED PROVIDER NOTES
In addition to the advanced practice provider, I personally saw [facility] Records and performed a substantive portion of the visit including all aspects of the medical decision making. Briefly, this is a 71 y.o. female here for right hip and leg pain after a fall down 2 steps which occurred just prior to arrival.  Patient states she slipped, no head injury or loss of consciousness. Not on any anticoagulation. On exam, patient afebrile and nontoxic. No distress. Heart RRR. Lungs CTAB. Abdomen soft, nondistended, nontender to palpation in all quadrants. There is internal rotation of the right thigh with valgus deformity of the right knee. There is a large right knee effusion, no overlying erythema or increased warmth. Exquisite tenderness to palpation overlying right greater trochanteric region and distal thigh. No tenderness along malleoli of ankle or fifth metatarsal head. Calf and thigh compartments are soft and easily compressible. 5 out of 5 motor and sensation grossly intact bilateral distal lower extremities. DP 2+ and symmetric. EKG  EKG was reviewed by emergency department physician in the absence of a cardiologist    Narrow complex sinus rhythm, rate 79, normal axis, normal VA and QRS intervals, normal Qtc, no ST elevations or depressions, normal t-wave morphology, impression NSR, no STEMI      Procedures      Fracture Dislocation Reduction  Date: 9/19/22  Indication: Displaced distal femoral fracture with angulation  Consent: signed    Risk versus benefits of planned procedure including pain, worsening fracture morphology, nerve injury were discussed with patient and she was agreeable to proceed. Patient underwent procedural sedation according to below procedure note. When adequate sedation obtained, right lower extremity was manually pulled in traction at the ankle until there was reduction of deformity. A posterior long-leg splint was placed.   Dorsalis pedis pulse 2+ both pre and postprocedure. Patient tolerated the procedure well and there were no complications. Conscious Sedation Procedure Note    Indication: fracture dislocation    Consent: I have discussed with the patient and/or the patient representative the indication, alternatives, and the possible risks and/or complications of the planned procedure and the anesthesia methods. The patient and/or patient representative appear to understand and agree to proceed. Physician Involvement: The attending physician was present and supervising this procedure. Pre-Sedation Documentation and Exam: I have personally completed a history, physical exam & review of systems for this patient (see notes). Airway Assessment: dentition not prohibitive, normal neck range of motion, Mallampati Class II - (soft palate, fauces & uvula are visible)    Prior History of Anesthesia Complications: none    ASA Classification: Class 2 - A normal healthy patient with mild systemic disease    Sedation/ Anesthesia Plan: intravenous sedation    Medications Used: propofol intravenously    Monitoring and Safety: The patient was placed on a cardiac monitor and vital signs, pulse oximetry and level of consciousness were continuously evaluated throughout the procedure. The patient was closely monitored until recovery from the medications was complete and the patient had returned to baseline status. Respiratory therapy was on standby at all times during the procedure.     (The following sections must be completed)  Post-Sedation Vital Signs: Vital signs were reviewed and were stable after the procedure (see flow sheet for vitals)            Post-Sedation Exam: Lungs: clear to auscultation bilaterally and Cardiovascular: regular rate and rhythm           Complications: none                Screenings   [place] Coma Scale  Eye Opening: Spontaneous  Best Verbal Response: Oriented  Best Motor Response: Obeys commands  [place] Coma Scale Score: 15      Is this patient to be included in the SEP-1 Core Measure due to severe sepsis or septic shock? No   Exclusion criteria - the patient is NOT to be included for SEP-1 Core Measure due to: Infection is not suspected      MDM    Patient afebrile and nontoxic. She appears uncomfortable however in no silvio painful or respiratory distress. Patient previously received fentanyl by EMS, additional pain medications given here to appropriate effect. Lower extremities are neurovascularly intact, no findings to suggest limb ischemia or compartment syndrome. Plain films show comminuted distal right femur fracture. No evidence of hip fracture or dislocation. Patient underwent conscious sedation with fracture dislocation reduction according to per above procedure notes and placed in long-leg splint. Her lower extremities remain neurovascularly intact postprocedure. Case was discussed with Dr. Miriam Menard for orthopedics who will evaluate tomorrow for operative intervention. Laboratory work-up was performed, consistent with mild hyponatremia, otherwise no evidence of acute endorgan dysfunction or clinically significant electrolyte derangement. Patient received judicious IV fluids. Case discussed with internal medicine team who will admit. Patient remained alert, no silvio distress and hemodynamically stable at time of admission. I Dr. Hoang Mathew am the primary clinician of record. Patient Referrals:  No follow-up provider specified. Discharge Medications:  Current Discharge Medication List          FINAL IMPRESSION  1. Closed fracture of distal end of right femur, unspecified fracture morphology, initial encounter ([facility] [address]    2. Hyponatremia        Blood pressure (!) 145/80, pulse 85, temperature 99.2 °F (37.3 °C), resp. rate 20, height 5' 8\" (1.727 m), weight 140 lb (63.5 kg), last menstrual period 02/14/1992, SpO2 99 %.      For further details of [facility] emergency department encounter, please see documentation by advanced practice provider, Yessenia Cook NP.     Mitzi Cash DO (electronically signed)  Attending Emergency Physician       Mitzi Cash DO  09/20/22 8517

## 2022-09-20 NOTE — ANESTHESIA PRE PROCEDURE
Department of Anesthesiology  Preprocedure Note       Name:  Óscar Montesinos   Age:  71 y.o.  :  1950                                          MRN:  [id number]         Date:  2022      Surgeon: Carlene Phillips):  Monica Bermeo MD    Procedure: OPEN REDUCTION INTERNAL FIXATION OF RIGHT DISTAL FEMUR (Right)    Medications prior to admission:   Prior to Admission medications    Medication Sig Start Date End Date Taking? Authorizing Provider   lisinopril-hydroCHLOROthiazide (PRINZIDE;ZESTORETIC) 10-12.5 MG per tablet Take 1 tablet by mouth once daily 22   Maria Ines Wilson MD   lovastatin (MEVACOR) 20 MG tablet Take 1 tablet by mouth nightly 22   Maria Ines Wilson MD   ibuprofen (ADVIL;MOTRIN) 600 MG tablet Take 600 mg by mouth daily as needed for Pain    Historical Provider, MD   acetaminophen (TYLENOL) 500 MG tablet Take 650 mg by mouth daily as needed for Pain    Historical Provider, MD   vitamin B-12 (CYANOCOBALAMIN) 500 MCG tablet Take 500 mcg by mouth daily    Historical Provider, MD   Calcium Carbonate-Vitamin D (CALCIUM + D PO) Take 1 tablet by mouth daily     Historical Provider, MD   Glucosamine-Chondroit-Vit C-Mn (GLUCOSAMINE CHONDR 1500 COMPLX) CAPS Take 1 tablet by mouth daily     Historical Provider, MD   Multiple Vitamins-Minerals (ONE-A-DAY WOMENS 50 PLUS PO) Take 1 tablet by mouth daily. Historical Provider, MD   Loratadine (CLARITIN PO) Take 1 tablet by mouth daily. Historical Provider, MD   Cranberry 1000 MG CAPS Take 1 tablet by mouth daily     Historical Provider, MD   niacin 250 MG tablet Take 250 mg by mouth daily (with breakfast). Historical Provider, MD   Polyethylene Glycol 3350 (MIRALAX PO) Take by mouth daily     Historical Provider, MD       Current medications:    No current facility-administered medications for this visit. No current outpatient medications on file.      Facility-Administered Medications Ordered in Other Visits   Medication Dose Route Frequency Provider Last Rate Last Admin    atorvastatin (LIPITOR) tablet 10 mg  10 mg Oral Daily AKIL Damon-C   10 mg at 09/20/22 0846    vitamin B-12 (CYANOCOBALAMIN) tablet 500 mcg  500 mcg Oral Daily Cheryl Loja, PA-C   500 mcg at 09/20/22 0846    sodium chloride flush 0.9 % injection 10 mL  10 mL IntraVENous 2 times per day Cheryl Loja PA-C        sodium chloride flush 0.9 % injection 10 mL  10 mL IntraVENous PRN Cheryl Loja, PA-C        0.9 % sodium chloride infusion   IntraVENous PRN Cheryl Loja, PA-C        enoxaparin (LOVENOX) injection 40 mg  40 mg SubCUTAneous Nightly Inés Shelton PA-C        magnesium hydroxide (MILK OF MAGNESIA) 400 MG/5ML suspension 30 mL  30 mL Oral Daily PRN Cheryl Loja, PA-C        acetaminophen (TYLENOL) tablet 650 mg  650 mg Oral Q6H PRN Cheryl Loja PA-C        Or    acetaminophen (TYLENOL) suppository 650 mg  650 mg Rectal Q6H PRN Cheryl Loja, PA-C        0.9 % sodium chloride infusion   IntraVENous Continuous AKIL Damon-C 75 mL/hr at 09/20/22 0108 New Bag at 09/20/22 0108    labetalol (NORMODYNE;TRANDATE) injection 10 mg  10 mg IntraVENous Q6H PRN Cheryl Loja, PA-C        oxyCODONE (ROXICODONE) immediate release tablet 5 mg  5 mg Oral Q4H PRN ERIN Palacios CNP        Or    oxyCODONE (ROXICODONE) immediate release tablet 10 mg  10 mg Oral Q4H PRN ERIN Palacios - CNP        tiZANidine (ZANAFLEX) tablet 4 mg  4 mg Oral Q6H PRN ERIN Palacios - CNP        acetaminophen (TYLENOL) tablet 1,000 mg  1,000 mg Oral TID ERIN Palacios CNP        ondansetron (ZOFRAN-ODT) disintegrating tablet 4 mg  4 mg Oral Q8H PRN Cheryl Loja, PA-C   4 mg at 09/19/22 2340    Or    ondansetron (ZOFRAN) injection 4 mg  4 mg IntraVENous Q6H PRN Cheryl Loja PA-C        HYDROmorphone HCl PF (DILAUDID) injection 0.5 mg  0.5 mg IntraVENous Q3H PRN Cheryl Loja PA-C Or    HYDROmorphone HCl PF (DILAUDID) injection 1 mg  1 mg IntraVENous Q3H PRN Lady Deb PA-C   1 mg at 09/20/22 1019       Allergies: Allergies   Allergen Reactions    No Known Allergies        Problem List:    Patient Active Problem List   Diagnosis Code    Hyperlipidemia E78.5    Essential hypertension, benign I10    1/28/20 RIGHT TKA M17.11    Wears hearing aid in right ear Z97.4    Closed fracture of distal end of right femur, unspecified fracture morphology, initial encounter ([facility] S72.401A       Past Medical History:        Diagnosis Date    Arthritis     knee    Hyperlipidemia     Hypertension        Past Surgical History:        Procedure Laterality Date    MIDDLE EAR SURGERY      Done 2 times.  TOTAL KNEE ARTHROPLASTY Right 1/28/2020    RIGHT ROBOTIC ASSISTED TOTAL KNEE ARTHROPLASTY - [employer]VANCED performed by Tara Ramírez MD at [address]          Social History:    Social History     Tobacco Use    Smoking status: Never    Smokeless tobacco: Never   Substance Use Topics    Alcohol use: Yes     Alcohol/week: 14.0 standard drinks     Types: 14 Glasses of wine per week     Comment: Couple of drinks nightly                                 Counseling given: Not Answered      Vital Signs (Current): There were no vitals filed for this visit.                                            BP Readings from Last 3 Encounters:   09/20/22 (!) 155/68   08/22/22 136/84   10/11/21 128/80       NPO Status:                                                                                 BMI:   Wt Readings from Last 3 Encounters:   09/20/22 140 lb (63.5 kg)   08/22/22 131 lb (59.4 kg)   07/05/22 140 lb (63.5 kg)     There is no height or weight on file to calculate BMI.    CBC:   Lab Results   Component Value Date/Time    WBC 9.7 09/19/2022 09:01 PM    RBC 3.59 09/19/2022 09:01 PM    HGB 11.1 09/19/2022 09:01 PM    HCT 33.4 09/19/2022 09:01 PM    MCV 92.9 09/19/2022 09:01 PM    RDW 12.3 09/19/2022 09:01 PM     09/19/2022 09:01 PM       CMP:   Lab Results   Component Value Date/Time     09/20/2022 04:52 AM    K 4.3 09/20/2022 04:52 AM    CL 98 09/20/2022 04:52 AM    CO2 24 09/20/2022 04:52 AM    BUN 16 09/20/2022 04:52 AM    CREATININE <0.5 09/20/2022 04:52 AM    GFRAA >60 09/20/2022 04:52 AM    GFRAA >60 02/19/2013 06:21 AM    AGRATIO 2.0 09/19/2022 09:01 PM    LABGLOM >60 09/20/2022 04:52 AM    GLUCOSE 153 09/20/2022 04:52 AM    PROT 6.3 09/19/2022 09:01 PM    PROT 7.3 02/19/2013 06:21 AM    CALCIUM 8.7 09/20/2022 04:52 AM    BILITOT 0.4 09/19/2022 09:01 PM    ALKPHOS 44 09/19/2022 09:01 PM    AST 25 09/19/2022 09:01 PM    ALT 19 09/19/2022 09:01 PM       POC Tests:   No results for input(s): POCGLU, POCNA, POCK, POCCL, POCBUN, POCHEMO, POCHCT in the last 72 hours. Coags:   Lab Results   Component Value Date/Time    PROTIME 11.8 01/17/2020 12:31 PM    INR 1.02 01/17/2020 12:31 PM    APTT 33.0 01/17/2020 12:31 PM       HCG (If Applicable): No results found for: PREGTESTUR, PREGSERUM, HCG, HCGQUANT     ABGs: No results found for: PHART, PO2ART, MQD7WWI, FLL8TRP, BEART, B1PYYZLS     Type & Screen (If Applicable):  No results found for: LABABO, [address]    Anesthesia Evaluation  Patient summary reviewed no history of anesthetic complications:   Airway: Mallampati: II  TM distance: >3 FB   Neck ROM: full  Mouth opening: > = 3 FB   Dental:          Pulmonary:                              Cardiovascular:    (+) hypertension:, hyperlipidemia        Rhythm: regular  Rate: normal                    Neuro/Psych:               GI/Hepatic/Renal:             Endo/Other:                     Abdominal:             Vascular: Other Findings:             Anesthesia Plan      general, MAC, regional and spinal     ASA 2       Induction: intravenous. Plan discussed with attending.                     ERIN Hackett - KEAGAN   9/20/2022

## 2022-09-20 NOTE — CARE COORDINATION
Discharge Planning Assessment  Risk of Readmission Score: 8%    RN discharge planner met with patient to discuss reason for admission, current living situation, and potential needs at the time of discharge. Demographics/Insurance verified Yes    Current type of dwelling: [place] home with one step to enter and 6-7 steps to change levels. Patient from ECF/SW confirmed with: N/A    Living arrangements: Alone    Level of function/Support: Independent    PCP: Roderick Morrell    Last Visit to PCP: 3 weeks ago    DME: cane, walker, walk-in shower with a shower chair    Active with any community resources/agencies/skilled home care: N/A    Medication compliance issues: Patient states she is compliant with her medications.     Financial issues that could impact healthcare: None    Tentative discharge plan: Surgery pending, PT/OT pending after surgery, ARU vs SNF vs C, discharge plan KAREN    Transportation at the time of discharge: ISAIAS Segundo RN    [facility]  Phone: [phone]

## 2022-09-20 NOTE — PROGRESS NOTES
Shift assessment completed. Neuro WNL. Denies any pain at this time. AM meds administered per MAR. Peralta and splint to RLE remains in place. Children at bedside. The care plan and education has been reviewed and mutually agreed upon with the patient. Standard safety measures in place.

## 2022-09-20 NOTE — H&P
mg by mouth daily as needed for Pain    Historical Provider, MD   vitamin B-12 (CYANOCOBALAMIN) 500 MCG tablet Take 500 mcg by mouth daily    Historical Provider, MD   Calcium Carbonate-Vitamin D (CALCIUM + D PO) Take 1 tablet by mouth daily     Historical Provider, MD   Glucosamine-Chondroit-Vit C-Mn (GLUCOSAMINE CHONDR 1500 COMPLX) CAPS Take 1 tablet by mouth daily     Historical Provider, MD   Multiple Vitamins-Minerals (ONE-A-DAY WOMENS 50 PLUS PO) Take 1 tablet by mouth daily. Historical Provider, MD   Loratadine (CLARITIN PO) Take 1 tablet by mouth daily. Historical Provider, MD   Cranberry 1000 MG CAPS Take 1 tablet by mouth daily     Historical Provider, MD   niacin 250 MG tablet Take 250 mg by mouth daily (with breakfast). Historical Provider, MD   Polyethylene Glycol 3350 (MIRALAX PO) Take by mouth daily     Historical Provider, MD       Allergies:  No known allergies    Social History:      TOBACCO:   reports that she has never smoked. She has never used smokeless tobacco.  ETOH:   reports current alcohol use of about 14.0 standard drinks per week. DRUGS:  reports no history of drug use. Family History:      Reviewed in detail positive as follows:        Problem Relation Age of Onset    Mental Illness Mother     Depression Mother     High Blood Pressure Father     Cancer Paternal Aunt     Breast Cancer Paternal Aunt        REVIEW OF SYSTEMS:   Pertinent positives as noted in the HPI. All other systems reviewed and negative. PHYSICAL EXAM PERFORMED:    BP (!) 144/69   Pulse 66   Temp 99.2 °F (37.3 °C)   Resp 19   Wt 140 lb (63.5 kg)   LMP 02/14/1992   SpO2 98%   BMI 21.29 kg/m²     General appearance:  Awake, alert, no apparent distress  HEENT:  Normocephalic, atraumatic without obvious deformity. PERRL. EOM intact. Conjunctivae/corneas clear. Neck: Supple, with full range of motion. No JVD. Trachea midline.   Respiratory:  Clear to auscultation bilaterally without rales, wheezes, or rhonchi. Normal respiratory effort. Cardiovascular:  Regular rate and rhythm without murmurs, rubs or gallops. Abdomen: Soft, NT, ND, without rebound or guarding. Normal bowel sounds. Extremities:  Right knee immobilizer in place. No clubbing, cyanosis, or edema bilaterally. +2 palpable pulses, equal bilaterally. Capillary refill brisk,< 3 seconds   Skin: No rashes or lesions. Warm/dry. Neurologic:  Neurovascularly intact without any focal sensory/motor deficits. Cranial nerves: II-XII intact, grossly non-focal. Alert and oriented x 3. Normal speech. Psychiatric:  Thought content appropriate, normal insight. Labs:   CBC   Recent Labs     09/19/22 2101   WBC 9.7   HGB 11.1*   HCT 33.4*           RENAL  Recent Labs     09/19/22 2101   *   K 3.7   CL 93*   CO2 21   BUN 18   CREATININE <0.5*       LFTS  Recent Labs     09/19/22 2101   AST 25   ALT 19   BILITOT 0.4   ALKPHOS 44       COAG  No results for input(s): INR in the last 72 hours. CARDIAC ENZYMES  No results for input(s): TROPONINI in the last 72 hours. LIPIDS  Cholesterol, Total   Date/Time Value Ref Range Status   08/24/2022 08:29  0 - 199 mg/dL Final     Triglycerides   Date/Time Value Ref Range Status   08/24/2022 08:29 AM 81 0 - 150 mg/dL Final     HDL   Date/Time Value Ref Range Status   08/24/2022 08:29 AM 74 (H) 40 - 60 mg/dL Final   02/24/2012 06:15  (H) 40 - 60 mg/dl Final     LDL Calculated   Date/Time Value Ref Range Status   08/24/2022 08:29 AM 86 <100 mg/dL Final         Radiology:     XR HIP 2-3 VW W PELVIS RIGHT   Final Result      Osteoarthritic change of the right hip. No evidence of fracture or   malalignment. XR KNEE RIGHT (1-2 VIEWS)   Final Result      Comminuted fracture of the distal femoral shaft above a uninvolved total knee   arthroplasty. The fracture is displaced and angulated anteriorly.          XR KNEE RIGHT (1-2 VIEWS)    (Results Pending)   CT FEMUR RIGHT WO CONTRAST (Results Pending)       EKG:   Read by ED physician in the absence of a cardiologist:  \"Narrow complex sinus rhythm, rate 79, normal axis, normal NH and QRS intervals, normal Qtc, no ST elevations or depressions, normal t-wave morphology, impression NSR, no STEMI\"      ASSESSMENT/PLAN:    Closed fracture of right distal femur  Xray notable for displaced, comminuted fracture of the distal femur  Reduced in ED. Splint in place  Pain control  NPO at midnight in anticipation of OR in AM  CT right femur ordered per Ortho request.  Results pending. Ortho consulted    Hyponatremia  Na 126  Hold home lisinopril/HCTZ   IVF with NS  Monitor Na    HTN  Hold home lisinopril/HCTZ for hyponatremia  IV Labetalol PRN    HLD  Continue home lovastatin (sub w/ atorvastatin)       DVT prophylaxis: Lovenox  Probiotic if on abx: N/A    Diet: ADULT DIET; Regular  Diet NPO Exceptions are: Sips of Water with Meds  Code Status: Full Code    Consults:  IP CONSULT TO ORTHOPEDIC SURGERY    Disposition: Admit to Inpatient   ELOS: Greater than two midnights due to medical therapy     Akash Sanchez PA-C    Thank you Samantha Arroyo MD for the opportunity to be involved in this patient's care. If you have any questions or concerns please feel free to contact me at [phone].

## 2022-09-20 NOTE — PROGRESS NOTES
Hospitalist Progress Note      PCP: Arlyn Cottrell MD    Date of Admission: 9/19/2022    Chief Complaint:   Right knee pain    Hospital Course: Maria Ines Crespo is a 71 y.o. female who presents to ED for evaluation of right knee pain s/p mechanical fall. Patient reports that she slipped on the second to last step while going down the stairs and landed with her right leg underneath and behind her. She reports she has been unable to stand or bend her knee. She reports severe pain with movement but minimal pain with rest.  She denies any other injury. She denies hitting her head. She denies dizziness or other symptoms prior to fall and reports fall was entirely mechanical in nature. She denies fever, chest pain, shortness of breath, cough, abdominal pain, nausea, vomiting, diarrhea, constipation, urinary symptoms. X-ray in ED notable for displaced comminuted fracture of the distal femur. Fracture was reduced in ED and splint was placed. Ortho consulted and requested CT.   Subjective:   Still has right knee pain       Medications:  Reviewed    Infusion Medications    sodium chloride      sodium chloride 75 mL/hr at 09/20/22 1627     Scheduled Medications    atorvastatin  10 mg Oral Daily    vitamin B-12  500 mcg Oral Daily    sodium chloride flush  10 mL IntraVENous 2 times per day    enoxaparin  40 mg SubCUTAneous Nightly    acetaminophen  1,000 mg Oral TID     PRN Meds: sodium chloride flush, sodium chloride, magnesium hydroxide, acetaminophen **OR** acetaminophen, labetalol, oxyCODONE **OR** oxyCODONE, tiZANidine, diazePAM, ondansetron **OR** ondansetron, HYDROmorphone **OR** HYDROmorphone      Intake/Output Summary (Last 24 hours) at 9/20/2022 1951  Last data filed at 9/20/2022 1634  Gross per 24 hour   Intake 2158.3 ml   Output 2825 ml   Net -666.7 ml       Physical Exam Performed:    BP (!) 159/66   Pulse 85   Temp 98.3 °F (36.8 °C) (Oral)   Resp 16   Ht 5' 8\" (1.727 m)   Wt 140 lb (63.5 kg)   LMP 02/14/1992   SpO2 98%   BMI 21.29 kg/m²     General appearance:  Awake, alert, no apparent distress  HEENT:  Normocephalic, atraumatic without obvious deformity. PERRL. EOM intact. Conjunctivae/corneas clear. Neck: Supple, with full range of motion. No JVD. Trachea midline. Respiratory:  Clear to auscultation bilaterally without rales, wheezes, or rhonchi. Normal respiratory effort. Cardiovascular:  Regular rate and rhythm without murmurs, rubs or gallops. Abdomen: Soft, NT, ND, without rebound or guarding. Normal bowel sounds. Extremities:  Right knee immobilizer in place. No clubbing, cyanosis, or edema bilaterally. +2 palpable pulses, equal bilaterally. Capillary refill brisk,< 3 seconds   Skin: No rashes or lesions. Warm/dry. Neurologic:  Neurovascularly intact without any focal sensory/motor deficits. Cranial nerves: II-XII intact, grossly non-focal. Alert and oriented x 3. Normal speech. Psychiatric:  Thought content appropriate, normal insight. Labs:   Recent Labs     09/19/22  2101   WBC 9.7   HGB 11.1*   HCT 33.4*        Recent Labs     09/19/22  2101 09/20/22  0452   * 130*   K 3.7 4.3   CL 93* 98*   CO2 21 24   BUN 18 16   CREATININE <0.5* <0.5*   CALCIUM 8.9 8.7     Recent Labs     09/19/22  2101   AST 25   ALT 19   BILITOT 0.4   ALKPHOS 44     No results for input(s): INR in the last 72 hours. No results for input(s): Holly Highman in the last 72 hours. Urinalysis:      Lab Results   Component Value Date/Time    NITRU Negative 01/17/2020 01:53 PM    BLOODU Negative 01/17/2020 01:53 PM    SPECGRAV 1.010 01/17/2020 01:53 PM    GLUCOSEU Negative 01/17/2020 01:53 PM       Radiology:  CT FEMUR RIGHT WO CONTRAST   Preliminary Result   1. Comminuted periprosthetic distal femur fracture, with extension through   the lateral femoral condyle. Associated lipohemarthrosis with additional   hemorrhage and edema in the vastus intermedius muscle.          XR KNEE RIGHT (1-2 VIEWS)   Final Result   Mild improved angulation of the comminuted displaced fracture of the distal   femoral metadiaphysis. XR HIP 2-3 VW W PELVIS RIGHT   Final Result      Osteoarthritic change of the right hip. No evidence of fracture or   malalignment. XR KNEE RIGHT (1-2 VIEWS)   Final Result      Comminuted fracture of the distal femoral shaft above a uninvolved total knee   arthroplasty. The fracture is displaced and angulated anteriorly. Assessment/Plan:     ASSESSMENT/PLAN:     Closed fracture of right distal femur on x-ray. comminuted fracture of the distal femur  Reduced in ED. Splint in place  Pain control  NPO at midnight in anticipation of OR in AM  CT right femur ordered per Ortho request.  Results pending. Ortho consulted     Hyponatremia  Na 126  Hold home lisinopril/HCTZ   IVF with NS  Monitor Na     HTN  Hold home lisinopril/HCTZ for hyponatremia  IV Labetalol PRN     HLD  Continue home lovastatin (sub w/ atorvastatin)        DVT prophylaxis: Lovenox  Probiotic if on abx: N/A     Diet: ADULT DIET;  Regular  Diet NPO Exceptions are: Sips of Water with Meds  Code Status: Full Code     Consults:  IP CONSULT TO ORTHOPEDIC SURGERY     Disposition: Admit to Inpatient     ELOS: Greater than two midnights due to medical therapy     Georgie Portillo MD

## 2022-09-20 NOTE — PROGRESS NOTES
This RT attended a conscience sedation. Ambu bag at head of bed. Patient placed on ETCO2 monitor and its 22.   On 2 lpm SPO2 is 100%

## 2022-09-21 ENCOUNTER — APPOINTMENT (OUTPATIENT)
Dept: GENERAL RADIOLOGY | Age: 72
DRG: 481 | End: 2022-09-21
Payer: MEDICARE

## 2022-09-21 ENCOUNTER — ANESTHESIA (OUTPATIENT)
Dept: OPERATING ROOM | Age: 72
DRG: 481 | End: 2022-09-21
Payer: MEDICARE

## 2022-09-21 LAB
ABO/RH: NORMAL
ANION GAP SERPL CALCULATED.3IONS-SCNC: 9 MMOL/L (ref 3–16)
ANTIBODY SCREEN: NORMAL
BUN BLDV-MCNC: 8 MG/DL (ref 7–20)
CALCIUM SERPL-MCNC: 8.1 MG/DL (ref 8.3–10.6)
CHLORIDE BLD-SCNC: 102 MMOL/L (ref 99–110)
CO2: 22 MMOL/L (ref 21–32)
CREAT SERPL-MCNC: <0.5 MG/DL (ref 0.6–1.2)
GFR AFRICAN AMERICAN: >60
GFR NON-AFRICAN AMERICAN: >60
GLUCOSE BLD-MCNC: 118 MG/DL (ref 70–99)
HCT VFR BLD CALC: 24.4 % (ref 36–48)
HEMOGLOBIN: 8.1 G/DL (ref 12–16)
INR BLD: 1.11 (ref 0.87–1.14)
MCH RBC QN AUTO: 31.3 PG (ref 26–34)
MCHC RBC AUTO-ENTMCNC: 33.3 G/DL (ref 31–36)
MCV RBC AUTO: 93.9 FL (ref 80–100)
PDW BLD-RTO: 12.5 % (ref 12.4–15.4)
PLATELET # BLD: 202 K/UL (ref 135–450)
PMV BLD AUTO: 8.2 FL (ref 5–10.5)
POTASSIUM REFLEX MAGNESIUM: 3.8 MMOL/L (ref 3.5–5.1)
PROTHROMBIN TIME: 14.2 SEC (ref 11.7–14.5)
RBC # BLD: 2.59 M/UL (ref 4–5.2)
SODIUM BLD-SCNC: 133 MMOL/L (ref 136–145)
WBC # BLD: 4.7 K/UL (ref 4–11)

## 2022-09-21 PROCEDURE — 94760 N-INVAS EAR/PLS OXIMETRY 1: CPT

## 2022-09-21 PROCEDURE — 2500000003 HC RX 250 WO HCPCS: Performed by: NURSE ANESTHETIST, CERTIFIED REGISTERED

## 2022-09-21 PROCEDURE — 6370000000 HC RX 637 (ALT 250 FOR IP): Performed by: PHYSICIAN ASSISTANT

## 2022-09-21 PROCEDURE — 2580000003 HC RX 258: Performed by: ORTHOPAEDIC SURGERY

## 2022-09-21 PROCEDURE — 2500000003 HC RX 250 WO HCPCS: Performed by: ORTHOPAEDIC SURGERY

## 2022-09-21 PROCEDURE — 99223 1ST HOSP IP/OBS HIGH 75: CPT | Performed by: ORTHOPAEDIC SURGERY

## 2022-09-21 PROCEDURE — 3600000014 HC SURGERY LEVEL 4 ADDTL 15MIN: Performed by: ORTHOPAEDIC SURGERY

## 2022-09-21 PROCEDURE — 0QSB04Z REPOSITION RIGHT LOWER FEMUR WITH INTERNAL FIXATION DEVICE, OPEN APPROACH: ICD-10-PCS | Performed by: INTERNAL MEDICINE

## 2022-09-21 PROCEDURE — 6360000002 HC RX W HCPCS: Performed by: ANESTHESIOLOGY

## 2022-09-21 PROCEDURE — 2580000003 HC RX 258: Performed by: NURSE ANESTHETIST, CERTIFIED REGISTERED

## 2022-09-21 PROCEDURE — 3700000001 HC ADD 15 MINUTES (ANESTHESIA): Performed by: ORTHOPAEDIC SURGERY

## 2022-09-21 PROCEDURE — C1713 ANCHOR/SCREW BN/BN,TIS/BN: HCPCS | Performed by: ORTHOPAEDIC SURGERY

## 2022-09-21 PROCEDURE — 6360000002 HC RX W HCPCS: Performed by: NURSE ANESTHETIST, CERTIFIED REGISTERED

## 2022-09-21 PROCEDURE — 3600000004 HC SURGERY LEVEL 4 BASE: Performed by: ORTHOPAEDIC SURGERY

## 2022-09-21 PROCEDURE — 86850 RBC ANTIBODY SCREEN: CPT

## 2022-09-21 PROCEDURE — 6360000002 HC RX W HCPCS: Performed by: ORTHOPAEDIC SURGERY

## 2022-09-21 PROCEDURE — 7100000001 HC PACU RECOVERY - ADDTL 15 MIN: Performed by: ORTHOPAEDIC SURGERY

## 2022-09-21 PROCEDURE — 73552 X-RAY EXAM OF FEMUR 2/>: CPT

## 2022-09-21 PROCEDURE — C1769 GUIDE WIRE: HCPCS | Performed by: ORTHOPAEDIC SURGERY

## 2022-09-21 PROCEDURE — P9047 ALBUMIN (HUMAN), 25%, 50ML: HCPCS | Performed by: NURSE ANESTHETIST, CERTIFIED REGISTERED

## 2022-09-21 PROCEDURE — 6360000002 HC RX W HCPCS: Performed by: PHYSICIAN ASSISTANT

## 2022-09-21 PROCEDURE — 7100000000 HC PACU RECOVERY - FIRST 15 MIN: Performed by: ORTHOPAEDIC SURGERY

## 2022-09-21 PROCEDURE — 2720000010 HC SURG SUPPLY STERILE: Performed by: ORTHOPAEDIC SURGERY

## 2022-09-21 PROCEDURE — 80048 BASIC METABOLIC PNL TOTAL CA: CPT

## 2022-09-21 PROCEDURE — 85610 PROTHROMBIN TIME: CPT

## 2022-09-21 PROCEDURE — 6370000000 HC RX 637 (ALT 250 FOR IP): Performed by: NURSE PRACTITIONER

## 2022-09-21 PROCEDURE — 86901 BLOOD TYPING SEROLOGIC RH(D): CPT

## 2022-09-21 PROCEDURE — 2709999900 HC NON-CHARGEABLE SUPPLY: Performed by: ORTHOPAEDIC SURGERY

## 2022-09-21 PROCEDURE — 86923 COMPATIBILITY TEST ELECTRIC: CPT

## 2022-09-21 PROCEDURE — 1200000000 HC SEMI PRIVATE

## 2022-09-21 PROCEDURE — 36430 TRANSFUSION BLD/BLD COMPNT: CPT

## 2022-09-21 PROCEDURE — 2580000003 HC RX 258: Performed by: PHYSICIAN ASSISTANT

## 2022-09-21 PROCEDURE — P9016 RBC LEUKOCYTES REDUCED: HCPCS

## 2022-09-21 PROCEDURE — 3700000000 HC ANESTHESIA ATTENDED CARE: Performed by: ORTHOPAEDIC SURGERY

## 2022-09-21 PROCEDURE — 36415 COLL VENOUS BLD VENIPUNCTURE: CPT

## 2022-09-21 PROCEDURE — 86900 BLOOD TYPING SEROLOGIC ABO: CPT

## 2022-09-21 PROCEDURE — A4217 STERILE WATER/SALINE, 500 ML: HCPCS | Performed by: ORTHOPAEDIC SURGERY

## 2022-09-21 PROCEDURE — 85027 COMPLETE CBC AUTOMATED: CPT

## 2022-09-21 PROCEDURE — 2500000003 HC RX 250 WO HCPCS: Performed by: ANESTHESIOLOGY

## 2022-09-21 PROCEDURE — 6370000000 HC RX 637 (ALT 250 FOR IP): Performed by: INTERNAL MEDICINE

## 2022-09-21 DEVICE — SCREW BNE L38MM DIA5MM CNDYL S STL ST VAR ANG LOK T25: Type: IMPLANTABLE DEVICE | Site: FEMUR | Status: FUNCTIONAL

## 2022-09-21 DEVICE — SCREW BNE L75MM DIA5MM CNDYL S STL ST VAR ANG LOK T25: Type: IMPLANTABLE DEVICE | Site: FEMUR | Status: FUNCTIONAL

## 2022-09-21 DEVICE — SCREW BNE L80MM DIA5MM CNDYL S STL ST VAR ANG LOK T25: Type: IMPLANTABLE DEVICE | Site: FEMUR | Status: FUNCTIONAL

## 2022-09-21 DEVICE — TRIGEN META RETROGRADE FEMORAL                                    NAIL 13MM X 28CM
Type: IMPLANTABLE DEVICE | Site: FEMUR | Status: FUNCTIONAL
Brand: TRIGEN

## 2022-09-21 DEVICE — SCREW BNE L38MM DIA4.5MM PROX CORT TIB S STL ST LOK FULL: Type: IMPLANTABLE DEVICE | Site: FEMUR | Status: FUNCTIONAL

## 2022-09-21 DEVICE — SCREW BNE L90MM DIA5MM CNDYL S STL ST VAR ANG LOK FULL THRD: Type: IMPLANTABLE DEVICE | Site: FEMUR | Status: FUNCTIONAL

## 2022-09-21 DEVICE — TRIGEN LOW PROFILE SCREW 5.0MM X 37.5MM
Type: IMPLANTABLE DEVICE | Site: FEMUR | Status: FUNCTIONAL
Brand: TRIGEN

## 2022-09-21 DEVICE — PLATE BNE L336MM 16 H NONSTERILE R CNDYL S STL CRV LOK: Type: IMPLANTABLE DEVICE | Site: FEMUR | Status: FUNCTIONAL

## 2022-09-21 DEVICE — SCREW BNE L34MM DIA5MM CNDYL S STL ST VAR ANG LOK FULL THRD: Type: IMPLANTABLE DEVICE | Site: FEMUR | Status: FUNCTIONAL

## 2022-09-21 RX ORDER — DEXMEDETOMIDINE HYDROCHLORIDE 100 UG/ML
INJECTION, SOLUTION INTRAVENOUS PRN
Status: DISCONTINUED | OUTPATIENT
Start: 2022-09-21 | End: 2022-09-21 | Stop reason: SDUPTHER

## 2022-09-21 RX ORDER — SODIUM CHLORIDE, SODIUM LACTATE, POTASSIUM CHLORIDE, CALCIUM CHLORIDE 600; 310; 30; 20 MG/100ML; MG/100ML; MG/100ML; MG/100ML
INJECTION, SOLUTION INTRAVENOUS CONTINUOUS PRN
Status: DISCONTINUED | OUTPATIENT
Start: 2022-09-21 | End: 2022-09-21 | Stop reason: SDUPTHER

## 2022-09-21 RX ORDER — LABETALOL HYDROCHLORIDE 5 MG/ML
10 INJECTION, SOLUTION INTRAVENOUS
Status: DISCONTINUED | OUTPATIENT
Start: 2022-09-21 | End: 2022-09-21 | Stop reason: HOSPADM

## 2022-09-21 RX ORDER — ONDANSETRON 2 MG/ML
INJECTION INTRAMUSCULAR; INTRAVENOUS PRN
Status: DISCONTINUED | OUTPATIENT
Start: 2022-09-21 | End: 2022-09-21 | Stop reason: SDUPTHER

## 2022-09-21 RX ORDER — LIDOCAINE HYDROCHLORIDE 20 MG/ML
INJECTION, SOLUTION EPIDURAL; INFILTRATION; INTRACAUDAL; PERINEURAL PRN
Status: DISCONTINUED | OUTPATIENT
Start: 2022-09-21 | End: 2022-09-21 | Stop reason: SDUPTHER

## 2022-09-21 RX ORDER — HYDROMORPHONE HCL 110MG/55ML
PATIENT CONTROLLED ANALGESIA SYRINGE INTRAVENOUS PRN
Status: DISCONTINUED | OUTPATIENT
Start: 2022-09-21 | End: 2022-09-21 | Stop reason: SDUPTHER

## 2022-09-21 RX ORDER — ROCURONIUM BROMIDE 10 MG/ML
INJECTION, SOLUTION INTRAVENOUS PRN
Status: DISCONTINUED | OUTPATIENT
Start: 2022-09-21 | End: 2022-09-21 | Stop reason: SDUPTHER

## 2022-09-21 RX ORDER — PROCHLORPERAZINE EDISYLATE 5 MG/ML
5 INJECTION INTRAMUSCULAR; INTRAVENOUS
Status: DISCONTINUED | OUTPATIENT
Start: 2022-09-21 | End: 2022-09-21 | Stop reason: HOSPADM

## 2022-09-21 RX ORDER — OXYCODONE HYDROCHLORIDE 5 MG/1
5 TABLET ORAL
Status: DISCONTINUED | OUTPATIENT
Start: 2022-09-21 | End: 2022-09-21 | Stop reason: HOSPADM

## 2022-09-21 RX ORDER — KETAMINE HCL IN NACL, ISO-OSM 100MG/10ML
SYRINGE (ML) INJECTION PRN
Status: DISCONTINUED | OUTPATIENT
Start: 2022-09-21 | End: 2022-09-21 | Stop reason: SDUPTHER

## 2022-09-21 RX ORDER — SODIUM CHLORIDE 9 MG/ML
INJECTION, SOLUTION INTRAVENOUS CONTINUOUS PRN
Status: DISCONTINUED | OUTPATIENT
Start: 2022-09-21 | End: 2022-09-21 | Stop reason: SDUPTHER

## 2022-09-21 RX ORDER — PROPOFOL 10 MG/ML
INJECTION, EMULSION INTRAVENOUS PRN
Status: DISCONTINUED | OUTPATIENT
Start: 2022-09-21 | End: 2022-09-21 | Stop reason: SDUPTHER

## 2022-09-21 RX ORDER — FENTANYL CITRATE 50 UG/ML
25 INJECTION, SOLUTION INTRAMUSCULAR; INTRAVENOUS EVERY 5 MIN PRN
Status: COMPLETED | OUTPATIENT
Start: 2022-09-21 | End: 2022-09-21

## 2022-09-21 RX ORDER — MAGNESIUM HYDROXIDE 1200 MG/15ML
LIQUID ORAL CONTINUOUS PRN
Status: COMPLETED | OUTPATIENT
Start: 2022-09-21 | End: 2022-09-21

## 2022-09-21 RX ORDER — SODIUM CHLORIDE 9 MG/ML
INJECTION, SOLUTION INTRAVENOUS PRN
Status: DISCONTINUED | OUTPATIENT
Start: 2022-09-21 | End: 2022-09-26 | Stop reason: HOSPADM

## 2022-09-21 RX ORDER — ACETAMINOPHEN 650 MG
TABLET, EXTENDED RELEASE ORAL
Status: COMPLETED | OUTPATIENT
Start: 2022-09-21 | End: 2022-09-21

## 2022-09-21 RX ORDER — FENTANYL CITRATE 50 UG/ML
INJECTION, SOLUTION INTRAMUSCULAR; INTRAVENOUS PRN
Status: DISCONTINUED | OUTPATIENT
Start: 2022-09-21 | End: 2022-09-21 | Stop reason: SDUPTHER

## 2022-09-21 RX ORDER — HYDROMORPHONE HCL 110MG/55ML
0.5 PATIENT CONTROLLED ANALGESIA SYRINGE INTRAVENOUS EVERY 5 MIN PRN
Status: COMPLETED | OUTPATIENT
Start: 2022-09-21 | End: 2022-09-21

## 2022-09-21 RX ORDER — ESMOLOL HYDROCHLORIDE 10 MG/ML
INJECTION INTRAVENOUS PRN
Status: DISCONTINUED | OUTPATIENT
Start: 2022-09-21 | End: 2022-09-21 | Stop reason: SDUPTHER

## 2022-09-21 RX ORDER — LIDOCAINE HYDROCHLORIDE 10 MG/ML
1 INJECTION, SOLUTION EPIDURAL; INFILTRATION; INTRACAUDAL; PERINEURAL
Status: DISCONTINUED | OUTPATIENT
Start: 2022-09-21 | End: 2022-09-21 | Stop reason: HOSPADM

## 2022-09-21 RX ORDER — MAGNESIUM SULFATE HEPTAHYDRATE 500 MG/ML
INJECTION, SOLUTION INTRAMUSCULAR; INTRAVENOUS PRN
Status: DISCONTINUED | OUTPATIENT
Start: 2022-09-21 | End: 2022-09-21 | Stop reason: SDUPTHER

## 2022-09-21 RX ORDER — HYDRALAZINE HYDROCHLORIDE 20 MG/ML
10 INJECTION INTRAMUSCULAR; INTRAVENOUS
Status: DISCONTINUED | OUTPATIENT
Start: 2022-09-21 | End: 2022-09-21 | Stop reason: HOSPADM

## 2022-09-21 RX ORDER — DEXAMETHASONE SODIUM PHOSPHATE 4 MG/ML
INJECTION, SOLUTION INTRA-ARTICULAR; INTRALESIONAL; INTRAMUSCULAR; INTRAVENOUS; SOFT TISSUE PRN
Status: DISCONTINUED | OUTPATIENT
Start: 2022-09-21 | End: 2022-09-21 | Stop reason: SDUPTHER

## 2022-09-21 RX ORDER — MIDAZOLAM HYDROCHLORIDE 1 MG/ML
INJECTION INTRAMUSCULAR; INTRAVENOUS PRN
Status: DISCONTINUED | OUTPATIENT
Start: 2022-09-21 | End: 2022-09-21 | Stop reason: SDUPTHER

## 2022-09-21 RX ORDER — ALBUMIN (HUMAN) 12.5 G/50ML
SOLUTION INTRAVENOUS PRN
Status: DISCONTINUED | OUTPATIENT
Start: 2022-09-21 | End: 2022-09-21 | Stop reason: SDUPTHER

## 2022-09-21 RX ORDER — ONDANSETRON 2 MG/ML
4 INJECTION INTRAMUSCULAR; INTRAVENOUS
Status: DISCONTINUED | OUTPATIENT
Start: 2022-09-21 | End: 2022-09-21 | Stop reason: HOSPADM

## 2022-09-21 RX ORDER — SUCCINYLCHOLINE/SOD CL,ISO/PF 200MG/10ML
SYRINGE (ML) INTRAVENOUS PRN
Status: DISCONTINUED | OUTPATIENT
Start: 2022-09-21 | End: 2022-09-21 | Stop reason: SDUPTHER

## 2022-09-21 RX ADMIN — HYDROMORPHONE HYDROCHLORIDE 0.5 MG: 2 INJECTION, SOLUTION INTRAMUSCULAR; INTRAVENOUS; SUBCUTANEOUS at 20:26

## 2022-09-21 RX ADMIN — SODIUM CHLORIDE: 9 INJECTION, SOLUTION INTRAVENOUS at 16:31

## 2022-09-21 RX ADMIN — MAGNESIUM SULFATE HEPTAHYDRATE 1 G: 500 INJECTION, SOLUTION INTRAMUSCULAR; INTRAVENOUS at 16:50

## 2022-09-21 RX ADMIN — ATORVASTATIN CALCIUM 10 MG: 10 TABLET, FILM COATED ORAL at 09:08

## 2022-09-21 RX ADMIN — FENTANYL CITRATE 25 MCG: 50 INJECTION INTRAMUSCULAR; INTRAVENOUS at 20:42

## 2022-09-21 RX ADMIN — CYANOCOBALAMIN TAB 1000 MCG 500 MCG: 1000 TAB at 09:08

## 2022-09-21 RX ADMIN — SUGAMMADEX 150 MG: 100 INJECTION, SOLUTION INTRAVENOUS at 19:32

## 2022-09-21 RX ADMIN — HYDROMORPHONE HYDROCHLORIDE 0.5 MG: 2 INJECTION, SOLUTION INTRAMUSCULAR; INTRAVENOUS; SUBCUTANEOUS at 20:31

## 2022-09-21 RX ADMIN — FENTANYL CITRATE 25 MCG: 50 INJECTION INTRAMUSCULAR; INTRAVENOUS at 20:13

## 2022-09-21 RX ADMIN — HYDROMORPHONE HYDROCHLORIDE 1 MG: 1 INJECTION, SOLUTION INTRAMUSCULAR; INTRAVENOUS; SUBCUTANEOUS at 23:40

## 2022-09-21 RX ADMIN — FENTANYL CITRATE 25 MCG: 50 INJECTION INTRAMUSCULAR; INTRAVENOUS at 20:08

## 2022-09-21 RX ADMIN — DEXAMETHASONE SODIUM PHOSPHATE 8 MG: 4 INJECTION, SOLUTION INTRAMUSCULAR; INTRAVENOUS at 16:51

## 2022-09-21 RX ADMIN — POLYETHYLENE GLYCOL 3350 17 G: 17 POWDER, FOR SOLUTION ORAL at 09:08

## 2022-09-21 RX ADMIN — Medication 10 MG: at 17:57

## 2022-09-21 RX ADMIN — HYDROMORPHONE HYDROCHLORIDE 0.5 MG: 2 INJECTION, SOLUTION INTRAMUSCULAR; INTRAVENOUS; SUBCUTANEOUS at 17:41

## 2022-09-21 RX ADMIN — SODIUM CHLORIDE: 9 INJECTION, SOLUTION INTRAVENOUS at 16:45

## 2022-09-21 RX ADMIN — FENTANYL CITRATE 50 MCG: 50 INJECTION, SOLUTION INTRAMUSCULAR; INTRAVENOUS at 16:38

## 2022-09-21 RX ADMIN — ROCURONIUM BROMIDE 20 MG: 10 INJECTION, SOLUTION INTRAVENOUS at 18:18

## 2022-09-21 RX ADMIN — PROPOFOL 50 MG: 10 INJECTION, EMULSION INTRAVENOUS at 16:39

## 2022-09-21 RX ADMIN — ALBUMIN (HUMAN) 12.5 G: 5 SOLUTION INTRAVENOUS at 18:33

## 2022-09-21 RX ADMIN — PROPOFOL 50 MG: 10 INJECTION, EMULSION INTRAVENOUS at 17:41

## 2022-09-21 RX ADMIN — Medication 10 MG: at 18:58

## 2022-09-21 RX ADMIN — CEFAZOLIN 2000 MG: 2 INJECTION, POWDER, FOR SOLUTION INTRAMUSCULAR; INTRAVENOUS at 16:25

## 2022-09-21 RX ADMIN — ONDANSETRON 4 MG: 2 INJECTION INTRAMUSCULAR; INTRAVENOUS at 16:51

## 2022-09-21 RX ADMIN — ROCURONIUM BROMIDE 10 MG: 10 INJECTION, SOLUTION INTRAVENOUS at 16:39

## 2022-09-21 RX ADMIN — TRANEXAMIC ACID 1000 MG: 1 INJECTION, SOLUTION INTRAVENOUS at 16:47

## 2022-09-21 RX ADMIN — FENTANYL CITRATE 25 MCG: 50 INJECTION INTRAMUSCULAR; INTRAVENOUS at 20:37

## 2022-09-21 RX ADMIN — HYDROMORPHONE HYDROCHLORIDE 1 MG: 1 INJECTION, SOLUTION INTRAMUSCULAR; INTRAVENOUS; SUBCUTANEOUS at 03:56

## 2022-09-21 RX ADMIN — LIDOCAINE HYDROCHLORIDE 80 MG: 20 INJECTION, SOLUTION EPIDURAL; INFILTRATION; INTRACAUDAL; PERINEURAL at 16:38

## 2022-09-21 RX ADMIN — HYDROMORPHONE HYDROCHLORIDE 0.5 MG: 2 INJECTION, SOLUTION INTRAMUSCULAR; INTRAVENOUS; SUBCUTANEOUS at 20:10

## 2022-09-21 RX ADMIN — ROCURONIUM BROMIDE 40 MG: 10 INJECTION, SOLUTION INTRAVENOUS at 16:45

## 2022-09-21 RX ADMIN — ROCURONIUM BROMIDE 30 MG: 10 INJECTION, SOLUTION INTRAVENOUS at 17:30

## 2022-09-21 RX ADMIN — ACETAMINOPHEN 1000 MG: 500 TABLET ORAL at 09:08

## 2022-09-21 RX ADMIN — Medication 10 ML: at 23:00

## 2022-09-21 RX ADMIN — Medication 140 MG: at 16:39

## 2022-09-21 RX ADMIN — FENTANYL CITRATE 50 MCG: 50 INJECTION, SOLUTION INTRAMUSCULAR; INTRAVENOUS at 16:50

## 2022-09-21 RX ADMIN — SODIUM CHLORIDE, POTASSIUM CHLORIDE, SODIUM LACTATE AND CALCIUM CHLORIDE: 600; 310; 30; 20 INJECTION, SOLUTION INTRAVENOUS at 17:54

## 2022-09-21 RX ADMIN — ESMOLOL HYDROCHLORIDE 20 MG: 100 INJECTION, SOLUTION INTRAVENOUS at 17:24

## 2022-09-21 RX ADMIN — HYDROMORPHONE HYDROCHLORIDE 0.5 MG: 2 INJECTION, SOLUTION INTRAMUSCULAR; INTRAVENOUS; SUBCUTANEOUS at 20:20

## 2022-09-21 RX ADMIN — MIDAZOLAM 2 MG: 1 INJECTION INTRAMUSCULAR; INTRAVENOUS at 16:27

## 2022-09-21 RX ADMIN — FENTANYL CITRATE 50 MCG: 50 INJECTION, SOLUTION INTRAMUSCULAR; INTRAVENOUS at 17:30

## 2022-09-21 RX ADMIN — PHENYLEPHRINE HYDROCHLORIDE 50 MCG: 10 INJECTION INTRAVENOUS at 18:09

## 2022-09-21 RX ADMIN — FENTANYL CITRATE 50 MCG: 50 INJECTION, SOLUTION INTRAMUSCULAR; INTRAVENOUS at 17:01

## 2022-09-21 RX ADMIN — Medication 30 MG: at 16:50

## 2022-09-21 RX ADMIN — PROPOFOL 150 MG: 10 INJECTION, EMULSION INTRAVENOUS at 16:38

## 2022-09-21 RX ADMIN — HYDROMORPHONE HYDROCHLORIDE 0.5 MG: 2 INJECTION, SOLUTION INTRAMUSCULAR; INTRAVENOUS; SUBCUTANEOUS at 17:57

## 2022-09-21 RX ADMIN — ONDANSETRON 4 MG: 2 INJECTION INTRAMUSCULAR; INTRAVENOUS at 21:32

## 2022-09-21 RX ADMIN — DEXMEDETOMIDINE HYDROCHLORIDE 8 MCG: 100 INJECTION, SOLUTION INTRAVENOUS at 17:18

## 2022-09-21 RX ADMIN — HYDRALAZINE HYDROCHLORIDE 10 MG: 20 INJECTION INTRAMUSCULAR; INTRAVENOUS at 20:24

## 2022-09-21 ASSESSMENT — PAIN SCALES - GENERAL
PAINLEVEL_OUTOF10: 0
PAINLEVEL_OUTOF10: 7
PAINLEVEL_OUTOF10: 8
PAINLEVEL_OUTOF10: 9
PAINLEVEL_OUTOF10: 7
PAINLEVEL_OUTOF10: 0
PAINLEVEL_OUTOF10: 6
PAINLEVEL_OUTOF10: 8
PAINLEVEL_OUTOF10: 6
PAINLEVEL_OUTOF10: 7
PAINLEVEL_OUTOF10: 6
PAINLEVEL_OUTOF10: 5
PAINLEVEL_OUTOF10: 2

## 2022-09-21 ASSESSMENT — PAIN DESCRIPTION - DESCRIPTORS
DESCRIPTORS: SHOOTING
DESCRIPTORS: ACHING
DESCRIPTORS: ACHING
DESCRIPTORS: PATIENT UNABLE TO DESCRIBE
DESCRIPTORS: THROBBING
DESCRIPTORS: PATIENT UNABLE TO DESCRIBE

## 2022-09-21 ASSESSMENT — PAIN DESCRIPTION - PAIN TYPE
TYPE: SURGICAL PAIN

## 2022-09-21 ASSESSMENT — PAIN DESCRIPTION - ORIENTATION
ORIENTATION: RIGHT

## 2022-09-21 ASSESSMENT — ENCOUNTER SYMPTOMS: SHORTNESS OF BREATH: 0

## 2022-09-21 ASSESSMENT — PAIN SCALES - WONG BAKER
WONGBAKER_NUMERICALRESPONSE: 2
WONGBAKER_NUMERICALRESPONSE: 2

## 2022-09-21 ASSESSMENT — PAIN DESCRIPTION - LOCATION
LOCATION: LEG

## 2022-09-21 ASSESSMENT — PAIN - FUNCTIONAL ASSESSMENT: PAIN_FUNCTIONAL_ASSESSMENT: NONE - DENIES PAIN

## 2022-09-21 NOTE — ANESTHESIA PRE PROCEDURE
Department of Anesthesiology  Preprocedure Note       Name:  Seun Vinson   Age:  71 y.o.  :  1950                                          MRN:  [id number]         Date:  2022      Surgeon: Nestor Corona):  Marilee Edge MD    Procedure: OPEN REDUCTION INTERNAL FIXATION OF RIGHT DISTAL FEMUR (Right)    Medications prior to admission:   Prior to Admission medications    Medication Sig Start Date End Date Taking? Authorizing Provider   lisinopril-hydroCHLOROthiazide (PRINZIDE;ZESTORETIC) 10-12.5 MG per tablet Take 1 tablet by mouth once daily 22   Chela Mendes MD   lovastatin (MEVACOR) 20 MG tablet Take 1 tablet by mouth nightly 22   Chela Mendes MD   ibuprofen (ADVIL;MOTRIN) 600 MG tablet Take 600 mg by mouth daily as needed for Pain    Historical Provider, MD   acetaminophen (TYLENOL) 500 MG tablet Take 650 mg by mouth daily as needed for Pain    Historical Provider, MD   vitamin B-12 (CYANOCOBALAMIN) 500 MCG tablet Take 500 mcg by mouth daily    Historical Provider, MD   Calcium Carbonate-Vitamin D (CALCIUM + D PO) Take 1 tablet by mouth daily     Historical Provider, MD   Glucosamine-Chondroit-Vit C-Mn (GLUCOSAMINE CHONDR 1500 COMPLX) CAPS Take 1 tablet by mouth daily     Historical Provider, MD   Multiple Vitamins-Minerals (ONE-A-DAY WOMENS 50 PLUS PO) Take 1 tablet by mouth daily. Historical Provider, MD   Loratadine (CLARITIN PO) Take 1 tablet by mouth daily. Historical Provider, MD   Cranberry 1000 MG CAPS Take 1 tablet by mouth daily     Historical Provider, MD   niacin 250 MG tablet Take 250 mg by mouth daily (with breakfast). Historical Provider, MD   Polyethylene Glycol 3350 (MIRALAX PO) Take by mouth daily     Historical Provider, MD       Current medications:    No current facility-administered medications for this visit. No current outpatient medications on file.      Facility-Administered Medications Ordered in Other Visits   Medication Dose Route Frequency Provider Last Rate Last Admin    0.9 % sodium chloride infusion   IntraVENous PRN ERIN Levi CNP        atorvastatin (LIPITOR) tablet 10 mg  10 mg Oral Daily Josiah Carranza PA-C   10 mg at 09/21/22 0908    vitamin B-12 (CYANOCOBALAMIN) tablet 500 mcg  500 mcg Oral Daily Josiah Carranza PA-C   500 mcg at 09/21/22 0908    sodium chloride flush 0.9 % injection 10 mL  10 mL IntraVENous 2 times per day Josiah Carranza PA-C        sodium chloride flush 0.9 % injection 10 mL  10 mL IntraVENous PRN Josiah Carranza PA-C        0.9 % sodium chloride infusion   IntraVENous PRN Josiah Carranza PA-C        enoxaparin (LOVENOX) injection 40 mg  40 mg SubCUTAneous Nightly Josiah Carranza PA-C   40 mg at 09/20/22 2142    magnesium hydroxide (MILK OF MAGNESIA) 400 MG/5ML suspension 30 mL  30 mL Oral Daily PRN Josiah Carranza PA-C        acetaminophen (TYLENOL) tablet 650 mg  650 mg Oral Q6H PRN Josiah Carranza PA-C        Or    acetaminophen (TYLENOL) suppository 650 mg  650 mg Rectal Q6H PRN Josiah Carranza PA-C        0.9 % sodium chloride infusion   IntraVENous Continuous Josiah Carranza PA-C 75 mL/hr at 09/20/22 1627 Rate Verify at 09/20/22 1627    labetalol (NORMODYNE;TRANDATE) injection 10 mg  10 mg IntraVENous Q6H PRN Josiah Carranza PA-C        oxyCODONE (ROXICODONE) immediate release tablet 5 mg  5 mg Oral Q4H PRN ERIN Levi CNP        Or    oxyCODONE (ROXICODONE) immediate release tablet 10 mg  10 mg Oral Q4H PRN ERIN Levi CNP   10 mg at 09/20/22 2142    tiZANidine (ZANAFLEX) tablet 4 mg  4 mg Oral Q6H PRN ERIN Lvei CNP        acetaminophen (TYLENOL) tablet 1,000 mg  1,000 mg Oral TID ERIN Levi CNP   1,000 mg at 09/21/22 0908    diazePAM (VALIUM) tablet 2 mg  2 mg Oral Q12H PRN Morena Valentine MD   2 mg at 09/20/22 1463    polyethylene glycol (GLYCOLAX) packet 17 g  17 g Oral Daily [facility] Sami Rea MD   17 g at 09/21/22 0908    ondansetron (ZOFRAN-ODT) disintegrating tablet 4 mg  4 mg Oral Q8H PRN Cyndra Coma, PA-C   4 mg at 09/19/22 2340    Or    ondansetron (ZOFRAN) injection 4 mg  4 mg IntraVENous Q6H PRN Cyndra Coma, PA-C        HYDROmorphone HCl PF (DILAUDID) injection 0.5 mg  0.5 mg IntraVENous Q3H PRN Cyndra Coma, PA-C        Or    HYDROmorphone HCl PF (DILAUDID) injection 1 mg  1 mg IntraVENous Q3H PRN Cyndra Coma, PA-C   1 mg at 09/21/22 0356       Allergies: Allergies   Allergen Reactions    No Known Allergies        Problem List:    Patient Active Problem List   Diagnosis Code    Hyperlipidemia E78.5    Essential hypertension, benign I10    1/28/20 RIGHT TKA M17.11    Wears hearing aid in right ear Z97.4    Closed fracture of distal end of right femur, unspecified fracture morphology[facility] S[facility].401A       Past Medical History:        Diagnosis Date    Arthritis     knee    Hyperlipidemia     Hypertension        Past Surgical History:        Procedure Laterality Date    MIDDLE EAR SURGERY      Done 2 times.  TOTAL KNEE ARTHROPLASTY Right 1/28/2020    RIGHT ROBOTIC ASSISTED TOTAL KNEE ARTHROPLASTY - [employer] performed by Eloina Villa MD at [address] [place] [address]         Social History:    Social History     Tobacco Use    Smoking status: Never    Smokeless tobacco: Never   Substance Use Topics    Alcohol use: Yes     Alcohol/week: 14.0 standard drinks     Types: 14 Glasses of wine per week     Comment: Couple of drinks nightly                                 Counseling given: Not Answered      Vital Signs (Current): There were no vitals filed for this visit.                                            BP Readings from Last 3 Encounters:   09/21/22 (!) 153/79   08/22/22 136/84   10/11/21 128/80       NPO Status:                                                                                 BMI: Wt Readings from Last 3 Encounters:   09/20/22 140 lb (63.5 kg)   08/22/22 131 lb (59.4 kg)   07/05/22 140 lb (63.5 kg)     There is no height or weight on file to calculate BMI.    CBC:   Lab Results   Component Value Date/Time    WBC 4.7 09/21/2022 04:55 AM    RBC 2.59 09/21/2022 04:55 AM    HGB 8.1 09/21/2022 04:55 AM    HCT 24.4 09/21/2022 04:55 AM    MCV 93.9 09/21/2022 04:55 AM    RDW 12.5 09/21/2022 04:55 AM     09/21/2022 04:55 AM       CMP:   Lab Results   Component Value Date/Time     09/21/2022 04:55 AM    K 3.8 09/21/2022 04:55 AM     09/21/2022 04:55 AM    CO2 22 09/21/2022 04:55 AM    BUN 8 09/21/2022 04:55 AM    CREATININE <0.5 09/21/2022 04:55 AM    GFRAA >60 09/21/2022 04:55 AM    GFRAA >60 02/19/2013 06:21 AM    AGRATIO 2.0 09/19/2022 09:01 PM    LABGLOM >60 09/21/2022 04:55 AM    GLUCOSE 118 09/21/2022 04:55 AM    PROT 6.3 09/19/2022 09:01 PM    PROT 7.3 02/19/2013 06:21 AM    CALCIUM 8.1 09/21/2022 04:55 AM    BILITOT 0.4 09/19/2022 09:01 PM    ALKPHOS 44 09/19/2022 09:01 PM    AST 25 09/19/2022 09:01 PM    ALT 19 09/19/2022 09:01 PM       POC Tests:   No results for input(s): POCGLU, POCNA, POCK, POCCL, POCBUN, POCHEMO, POCHCT in the last 72 hours.     Coags:   Lab Results   Component Value Date/Time    PROTIME 14.2 09/21/2022 04:55 AM    INR 1.11 09/21/2022 04:55 AM    APTT 33.0 01/17/2020 12:31 PM       HCG (If Applicable): No results found for: PREGTESTUR, PREGSERUM, HCG, HCGQUANT     ABGs: No results found for: PHART, PO2ART, YMW3WZR, IOA3YSK, BEART, O9ONNXSZ     Type & Screen (If Applicable):  No results found for: LABABO, [address]    Anesthesia Evaluation  Patient summary reviewed and Nursing notes reviewed no history of anesthetic complications:   Airway: Mallampati: II  TM distance: >3 FB   Neck ROM: full  Mouth opening: > = 3 FB   Dental: normal exam         Pulmonary:       (-) asthma and shortness of breath                           Cardiovascular:    (+) hypertension:, hyperlipidemia    (-)  angina      Rhythm: regular  Rate: normal                    Neuro/Psych:   (+) depression/anxiety    (-) CVA           GI/Hepatic/Renal:        (-) GERD and liver disease       Endo/Other:    (+) blood dyscrasia: anemia:., electrolyte abnormalities (low Na), .    (-) diabetes mellitus, hypothyroidism               Abdominal:             Vascular:     - PVD. Other Findings:             Anesthesia Plan      general     ASA 3       Induction: intravenous. MIPS: Postoperative opioids intended and Prophylactic antiemetics administered. Anesthetic plan and risks discussed with patient. Use of blood products discussed with patient whom. Plan discussed with CRNA.                     Bony Hollins MD   9/21/2022

## 2022-09-21 NOTE — BRIEF OP NOTE
Brief Postoperative Note      Patient: Amrik Alas  YOB: 1950  MRN: [id number]    Date of Procedure: 9/21/2022    Pre-Op Diagnosis: Closed fracture of right femur, unspecified fracture morphology, unspecified portion of femur, initial encounter ([place] [address] Rommel Puckett    Post-Op Diagnosis: Same       Procedure(s):  OPEN REDUCTION INTERNAL FIXATION OF RIGHT DISTAL FEMUR    Surgeon(s):  Maribel Agee MD    Assistant:  Surgical Assistant: Fior Lanier    Anesthesia: General    Estimated Blood Loss (mL): 397     Complications: None    Specimens:   * No specimens in log *    Implants:  Implant Name Type Inv. Item Serial No.  Lot No. LRB No. Used Action   NAIL IM L280MM [id number] FEM TIB TI ISRAEL LCK RG GLD META-NAIL - [id number]  NAIL IM L280MM [id number] FEM TIB TI ISRAEL LCK RG GLD META-NAIL  [facility] [id number] Right 1 Implanted   SCREW BNE L37.5MM DIA5MM NAMITA TIB G TI ST SELF DRL HEX DRV - [id number]  SCREW BNE L37.5MM DIA5MM NAMITA TIB G TI ST SELF DRL HEX DRV  [place] [facility] [id number] Right 1 Implanted   SCREW BNE L37.5MM DIA5MM NAMITA TIB G TI ST SELF DRL HEX DRV - [id number]  SCREW BNE L37.5MM DIA5MM NAMITA TIB G TI ST SELF DRL HEX DRV  [place] [facility] [id number] Right 1 Implanted   PLATE BNE [id number] 16 H NONSTERILE R CNDYL S STL CRV JUSTINE - [id number]  PLATE BNE [id number] 16 H NONSTERILE R CNDYL S STL CRV JUSTINE  [employer]PU[employer]  Right 1 Implanted   SCREW BNE L38MM DIA4. 5MM PROX NAMITA TIB S STL ST JUSTINE FULL - [id number]  SCREW BNE L38MM DIA4. 5MM PROX NAMITA TIB S STL ST JUSTINE FULL  DEPU[employer]  Right 3 Implanted   SCREW BNE L90MM DIA5MM CNDYL S STL ST SHUN ANG JUSTINE FULL THRD - [id number]  SCREW BNE L90MM DIA5MM CNDYL S STL ST SHUN ANG JUSTINE FULL THRD  [employer]PU[employer]  Right 2 Implanted   SCREW BNE L75MM DIA5MM CNDYL S STL ST SHUN ANG JUSTINE T25 - [id number]  SCREW BNE L75MM DIA5MM CNDYL S STL ST SHUN ANG JUSTINE T25  [employer]PU[employer] [place]-  Right 1 Implanted SCREW BNE L80MM DIA5MM CNDYL S STL ST SHUN ANG JUSTINE T25 - [id number]  SCREW BNE L80MM DIA5MM CNDYL S STL ST SHUN ANG JUSTINE T25  [employer]  Right 2 Implanted   SCREW BNE L34MM DIA5MM CNDYL S STL ST SHUN ANG JUSTINE FULL THRD - [id number]  SCREW BNE L34MM DIA5MM CNDYL S STL ST SHUN ANG JUSTINE FULL THRD  DEPU[employer]  Right 1 Implanted   SCREW BNE L38MM DIA5MM CNDYL S STL ST SHUN ANG JUSTINE T25 - [id number]  SCREW BNE L38MM DIA5MM CNDYL S STL ST SHUN ANG JUSTINE T25  DEPU[employer]  Right 1 Implanted         Drains:   Urinary Catheter 09/19/22 Peralta (Active)   $ Urethral catheter insertion Inserted for procedure 09/20/22 1936   Catheter Indications Prolonged immobilization (e.g. unstable thoracic or lumbar spine, multiple traumatic injuries such as pelvic fractures) 09/21/22 0900   Site Assessment No urethral drainage 09/21/22 1415   Urine Color Yellow 09/21/22 1415   Urine Appearance Clear 09/21/22 1415   Urine Odor Other (Comment) 09/20/22 1936   Collection Container Standard 09/21/22 1415   Securement Method Securing device (Describe) 09/21/22 1415   Catheter Care Completed Yes 09/21/22 0900   Catheter Best Practices  Drainage tube clipped to bed;Catheter secured to thigh; Bag below bladder;Bag not on floor 09/21/22 1100   Status Draining;Patent 09/21/22 1415   Manual Irrigation Volume Input (mL) 0 mL 09/20/22 1936   Output (mL) 425 mL 09/21/22 1415       Findings: see operative report    Plan of care:   Weight bearing status- as tolerated on operative extremity   Precautions- KI then hinged knee brace locked in extension.    Pain control- Tylenol 1000mg TID, toradol x 48 hrs, oxycodone PRN   Peralta- remove POD 1  Drain- none   Wound care- staples compressive dressing  Antibiotics- perioperative  antibiotics  DVT ppx- Lovenox x 4 weeks  Dispo- pending progression with PTOT  Follow up- 2 weeks for wound check [No focal deficits] : no focal deficits [Normal] : affect appropriate [de-identified] : Tenderness to RLQ. Bowel sounds present in all four quadrants, abdominal soft, no hepatosplenomegaly

## 2022-09-21 NOTE — PROGRESS NOTES
Patient resting in bed. Shift assessment completed. Neuro checks WNL. Denies any pain at this time. AM meds administered per MAR. Peralta and splint to RLE remains in place. The care plan and education has been reviewed and mutually agreed upon with the patient. Standard safety measures in place.

## 2022-09-21 NOTE — PROGRESS NOTES
Hospitalist Progress Note      PCP: Anibal Mcgowan MD    Date of Admission: 9/19/2022    Chief Complaint:   Right knee pain    Hospital Course: Christopher Rodrigues is a 71 y.o. female who presents to ED for evaluation of right knee pain s/p mechanical fall. Patient reports that she slipped on the second to last step while going down the stairs and landed with her right leg underneath and behind her. She reports she has been unable to stand or bend her knee. She reports severe pain with movement but minimal pain with rest.  She denies any other injury. She denies hitting her head. She denies dizziness or other symptoms prior to fall and reports fall was entirely mechanical in nature. She denies fever, chest pain, shortness of breath, cough, abdominal pain, nausea, vomiting, diarrhea, constipation, urinary symptoms. X-ray in ED notable for displaced comminuted fracture of the distal femur. Fracture was reduced in ED and splint was placed. Ortho consulted and requested CT.     Subjective:   Still has right knee pain   Has anxiety- needed daizpam last night      Medications:  Reviewed    Infusion Medications    sodium chloride      sodium chloride      sodium chloride 75 mL/hr at 09/20/22 1627     Scheduled Medications    atorvastatin  10 mg Oral Daily    vitamin B-12  500 mcg Oral Daily    sodium chloride flush  10 mL IntraVENous 2 times per day    enoxaparin  40 mg SubCUTAneous Nightly    acetaminophen  1,000 mg Oral TID    polyethylene glycol  17 g Oral Daily     PRN Meds: sodium chloride, sodium chloride flush, sodium chloride, magnesium hydroxide, acetaminophen **OR** acetaminophen, labetalol, oxyCODONE **OR** oxyCODONE, tiZANidine, diazePAM, ondansetron **OR** ondansetron, HYDROmorphone **OR** HYDROmorphone      Intake/Output Summary (Last 24 hours) at 9/21/2022 1338  Last data filed at 9/21/2022 1100  Gross per 24 hour   Intake 1658.3 ml   Output 3550 ml   Net -1891.7 ml         Physical Exam Performed:    BP (!) 142/71   Pulse 78   Temp 98.6 °F (37 °C) (Oral)   Resp 16   Ht 5' 8\" (1.727 m)   Wt 140 lb (63.5 kg)   LMP 02/14/1992   SpO2 96%   BMI 21.29 kg/m²     General appearance:  Awake, alert, no apparent distress  HEENT:  Normocephalic, atraumatic without obvious deformity. PERRL. EOM intact. Conjunctivae/corneas clear. Neck: Supple, with full range of motion. No JVD. Trachea midline. Respiratory:  Clear to auscultation bilaterally without rales, wheezes, or rhonchi. Normal respiratory effort. Cardiovascular:  Regular rate and rhythm without murmurs, rubs or gallops. Abdomen: Soft, NT, ND, without rebound or guarding. Normal bowel sounds. Extremities:  Right knee immobilizer in place. No clubbing, cyanosis, or edema bilaterally. +2 palpable pulses, equal bilaterally. Capillary refill brisk,< 3 seconds   Skin: No rashes or lesions. Warm/dry. Neurologic:  Neurovascularly intact without any focal sensory/motor deficits. Cranial nerves: II-XII intact, grossly non-focal. Alert and oriented x 3. Normal speech. Psychiatric:  Thought content appropriate, normal insight. Labs:   Recent Labs     09/19/22 2101 09/21/22  0455   WBC 9.7 4.7   HGB 11.1* 8.1*   HCT 33.4* 24.4*    202       Recent Labs     09/19/22 2101 09/20/22 0452 09/21/22  0455   * 130* 133*   K 3.7 4.3 3.8   CL 93* 98* 102   CO2 21 24 22   BUN 18 16 8   CREATININE <0.5* <0.5* <0.5*   CALCIUM 8.9 8.7 8.1*       Recent Labs     09/19/22 2101   AST 25   ALT 19   BILITOT 0.4   ALKPHOS 44       Recent Labs     09/21/22  0455   INR 1.11     No results for input(s): Theresa Griffin in the last 72 hours. Urinalysis:      Lab Results   Component Value Date/Time    NITRU Negative 01/17/2020 01:53 PM    BLOODU Negative 01/17/2020 01:53 PM    SPECGRAV 1.010 01/17/2020 01:53 PM    GLUCOSEU Negative 01/17/2020 01:53 PM       Radiology:  CT FEMUR RIGHT WO CONTRAST   Preliminary Result   1.  Comminuted periprosthetic distal femur fracture, with extension through   the lateral femoral condyle. Associated lipohemarthrosis with additional   hemorrhage and edema in the vastus intermedius muscle. XR KNEE RIGHT (1-2 VIEWS)   Final Result   Mild improved angulation of the comminuted displaced fracture of the distal   femoral metadiaphysis. XR HIP 2-3 VW W PELVIS RIGHT   Final Result      Osteoarthritic change of the right hip. No evidence of fracture or   malalignment. XR KNEE RIGHT (1-2 VIEWS)   Final Result      Comminuted fracture of the distal femoral shaft above a uninvolved total knee   arthroplasty. The fracture is displaced and angulated anteriorly. Assessment/Plan:     ASSESSMENT/PLAN:     Closed fracture of right distal femur on x-ray. comminuted fracture of the distal femur  Reduced in ED. Splint in place  Pain control  To OR today       Hyponatremia  Na 126-->133  Hold home lisinopril/HCTZ   IVF with NS  Monitor Na qd     HTN  Hold home lisinopril/HCTZ for hyponatremia  IV Labetalol PRN     HLD  Continue home lovastatin (sub w/ atorvastatin)    Anxiety  PRN Diazepam  Consult to psych to advice        DVT prophylaxis: Lovenox  Probiotic if on abx: N/A     Diet: ADULT DIET;  Regular  Diet NPO Exceptions are: Sips of Water with Meds  Code Status: Full Code     Consults:  IP CONSULT TO ORTHOPEDIC SURGERY     Disposition: Admit to Inpatient     ELOS: Greater than two midnights due to medical therapy     Roopa Pelaez MD

## 2022-09-21 NOTE — OP NOTE
S STL [facility] SHUN ANG JUSTINE T25  DEPU[employer]  Right 2 Implanted   SCREW BNE L34MM DIA5MM CNDYL S STL [facility] SHUN ANG JUSTINE FULL THRD - [id number]  SCREW BNE L34MM DIA5MM CNDYL S STL ST SHUN ANG JUSTINE FULL THRD  [employer]  Right 1 Implanted   SCREW BNE L38MM DIA5MM CNDYL S STL ST SHUN ANG JUSTINE T25 - [id number]  SCREW BNE L38MM DIA5MM CNDYL S STL ST SHUN ANG JUSTINE T25  DEPU[employer] [place]  Right 1 Implanted        INDICATIONS: Rosalva Cook is a 71 y.o. female with fall sustaining a comminuted displaced prosthetic right distal femur fracture. There was extension of the fracture list distally to the level of the joint however the implant appeared to be stable. Of note she had had issues with the knee including anterior knee pain she denied any issues like instability or swelling or redness of the knee. Given the fracture pattern and the patient's ambulatory status decision was made to proceed with operative fixation. I explained to the patient the risks of the surgery including malunion nonunion. I did explain that we would be using both a intra-articular approach for a nail as well as a lateral approach for a plate. I did explain that this would allow earlier weightbearing. The risks and benefits of surgery were discussed with the patient, as well as the alternatives to surgery and the expected post-operative course. Informed consent was obtained. DESCRIPTION OF OPERATION: She was met in the preoperative holding area where the informed consent was reviewed and the operative site was marked. She was then taken back to the Operating Room. After induction of anesthesia, she was then placed into the supine position. All bony prominences were well padded. The operative extremity was prepped and draped in the usual sterile fashion. A safety timeout was performed where the correct patient, planned operative procedure, and correct operative site was reviewed and agreed upon by all Operating Room staff.  It was also confirmed that the patient had received a dose of intravenous prophylactic antibiotics. PROCEDURE: First the prior knee incision was used and the medial parapatellar arthrotomy was used to access the intra-articular region of the knee. There was a posterior stabilized total knee arthroplasty with an open box. The open box identified and the guidewire was placed. Next the ball-tipped guidewire was used and passed across the fracture site. At this point reduction of the fracture was attempted however due to the significant comminution and deforming forces it was difficult to anatomically align the fracture. Sequential reamers were used to a size 14.5 for 13 nail. The nail was passed through the box. At this point the lateral incision was used extending down to the level of fascia this was incised in line with the femur and the vastus lateralis was elevated off of the femur. The lateral condyle of the femur and the metaphyseal region of the distal femur were identified. Once again there was significant comminution noted however the fracture site was not exposed as not to disrupt the fracture soft tissue envelope. A 16 hole plate was passed submuscularly along the femur and this was confirmed to be adequately sized extending proximal to the nail. This was secured both distally and proximally and confirmed on AP and lateral views to be well-positioned proximally and distally. The nail was then advanced as able to allow utilization between the lateral plate and the nail. Screws were placed distally locking screws to secure the distal locking plate and the proximal screws were placed as well to anchor the plate. The nail was then fixated proximally with proximal interlocks using perfect [place] technique. Additional proximal screws were added around the nail and these were bicortical laced minimally invasively using a targeter.   In total we had 8 cortices proximal.  X-rays were again performed confirming proper position of the plate as well as relative reduction of the fracture site using her bridge plating techniques. However at this point it was noted that the nail was slightly prominent within the knee despite evaluating this multiple times throughout the surgery. This may have been related to some shortening at the fracture site prior to locking the nail. And this was affecting the tip of the PS post.  I did shave down the tip of the post to accommodate the nail as had not with damage the end of the nail in case we needed to remove it later. Was only a few millimeters. I did not feel that this change the stability of the knee. However even before shaving down the post I did feel that there was some laxity in the knee whether this was from shortening from the fracture or laxity within the knee from prior arthroplasty it was difficult to determine I did decide that if we need to come back to address any instability the knee we could do that at a later date as well as remove the intramedullary nail. In the meantime we would maintain her in a brace to prevent any symptomatic instability in the knee while the fracture heals. At this point the knee was copiously irrigated along all the incisions. The lateral and anterior knee fascia was closed with a #1 Vicryl and a strata fix in a running fashion. Subcutaneous tissue was closed with 0 Vicryl and 2-0 Vicryl and the skin was closed with staples. A sterile compressive dressing was applied along the leg and the knee was placed in a knee immobilizer. ATTESTATION: Dr. Duane Tello was present and scrubbed for the entire procedure.

## 2022-09-21 NOTE — PROGRESS NOTES
Patient signed consent for blood transfusion at this time. 1037  Started  transfusion of 1 unit PRBC at this time. Stayed with patient the first 15 mins and obtained VS. Patient is tolerating transfusion at this time. Will continue to monitor patient. 1:45pm  PRBC transfusion completed; patient tolerated well.

## 2022-09-21 NOTE — PROGRESS NOTES
[facility] Orthopedic Surgery  Plan of Care Note        Pt seen at bedside. Has been NPO. Pain controlled. Hgb down to 8.1, stable vitals. Asymptomatic. Plan:  - 1 unit PRBC now and 1 unit to be held for OR. I have discussed with the patient the rationale for blood component transfusion; its benefits in treating or preventing fatigue, organ damage, or death; and its risk which includes mild transfusion reactions, rare risk of blood borne infection, or more serious but rare reactions. I have discussed the alternatives to transfusion, including the risk and consequences of not receiving transfusion.  The patient had an opportunity to ask questions and had agreed to proceed with transfusion of blood components.    - NPO   - NWB ; continue splint  - Verify informed consent  - Appreicate pre-op clearance from hospitalist     ERIN Johansen CNP 9/21/2022 10:15 AM

## 2022-09-22 PROBLEM — F41.9 ANXIETY DISORDER: Status: ACTIVE | Noted: 2022-09-22

## 2022-09-22 LAB
ANION GAP SERPL CALCULATED.3IONS-SCNC: 9 MMOL/L (ref 3–16)
BUN BLDV-MCNC: 8 MG/DL (ref 7–20)
CALCIUM SERPL-MCNC: 8 MG/DL (ref 8.3–10.6)
CHLORIDE BLD-SCNC: 101 MMOL/L (ref 99–110)
CO2: 19 MMOL/L (ref 21–32)
CREAT SERPL-MCNC: <0.5 MG/DL (ref 0.6–1.2)
GFR AFRICAN AMERICAN: >60
GFR NON-AFRICAN AMERICAN: >60
GLUCOSE BLD-MCNC: 134 MG/DL (ref 70–99)
HCT VFR BLD CALC: 25.8 % (ref 36–48)
HEMOGLOBIN: 8.6 G/DL (ref 12–16)
MCH RBC QN AUTO: 30.9 PG (ref 26–34)
MCHC RBC AUTO-ENTMCNC: 33.5 G/DL (ref 31–36)
MCV RBC AUTO: 92.4 FL (ref 80–100)
PDW BLD-RTO: 13.7 % (ref 12.4–15.4)
PLATELET # BLD: 225 K/UL (ref 135–450)
PMV BLD AUTO: 9.4 FL (ref 5–10.5)
POTASSIUM REFLEX MAGNESIUM: 3.6 MMOL/L (ref 3.5–5.1)
RBC # BLD: 2.79 M/UL (ref 4–5.2)
SODIUM BLD-SCNC: 129 MMOL/L (ref 136–145)
WBC # BLD: 8.4 K/UL (ref 4–11)

## 2022-09-22 PROCEDURE — 6370000000 HC RX 637 (ALT 250 FOR IP): Performed by: NURSE PRACTITIONER

## 2022-09-22 PROCEDURE — 2500000003 HC RX 250 WO HCPCS: Performed by: PHYSICIAN ASSISTANT

## 2022-09-22 PROCEDURE — 85027 COMPLETE CBC AUTOMATED: CPT

## 2022-09-22 PROCEDURE — 36415 COLL VENOUS BLD VENIPUNCTURE: CPT

## 2022-09-22 PROCEDURE — 6360000002 HC RX W HCPCS: Performed by: PHYSICIAN ASSISTANT

## 2022-09-22 PROCEDURE — 97530 THERAPEUTIC ACTIVITIES: CPT

## 2022-09-22 PROCEDURE — 97535 SELF CARE MNGMENT TRAINING: CPT

## 2022-09-22 PROCEDURE — 6370000000 HC RX 637 (ALT 250 FOR IP): Performed by: PHYSICIAN ASSISTANT

## 2022-09-22 PROCEDURE — 97162 PT EVAL MOD COMPLEX 30 MIN: CPT

## 2022-09-22 PROCEDURE — 94760 N-INVAS EAR/PLS OXIMETRY 1: CPT

## 2022-09-22 PROCEDURE — 6370000000 HC RX 637 (ALT 250 FOR IP): Performed by: INTERNAL MEDICINE

## 2022-09-22 PROCEDURE — 80048 BASIC METABOLIC PNL TOTAL CA: CPT

## 2022-09-22 PROCEDURE — 99024 POSTOP FOLLOW-UP VISIT: CPT | Performed by: NURSE PRACTITIONER

## 2022-09-22 PROCEDURE — 1200000000 HC SEMI PRIVATE

## 2022-09-22 PROCEDURE — 2580000003 HC RX 258: Performed by: PHYSICIAN ASSISTANT

## 2022-09-22 PROCEDURE — 97166 OT EVAL MOD COMPLEX 45 MIN: CPT

## 2022-09-22 PROCEDURE — APPNB45 APP NON BILLABLE 31-45 MINUTES: Performed by: NURSE PRACTITIONER

## 2022-09-22 PROCEDURE — 99221 1ST HOSP IP/OBS SF/LOW 40: CPT | Performed by: PSYCHIATRY & NEUROLOGY

## 2022-09-22 RX ORDER — OXYCODONE HYDROCHLORIDE AND ACETAMINOPHEN 5; 325 MG/1; MG/1
1-2 TABLET ORAL EVERY 6 HOURS PRN
Qty: 42 TABLET | Refills: 0 | Status: SHIPPED | OUTPATIENT
Start: 2022-09-22 | End: 2022-09-23 | Stop reason: HOSPADM

## 2022-09-22 RX ORDER — ASPIRIN 81 MG/1
81 TABLET ORAL 2 TIMES DAILY
Qty: 84 TABLET | Refills: 0 | Status: SHIPPED | OUTPATIENT
Start: 2022-09-22 | End: 2022-09-23 | Stop reason: HOSPADM

## 2022-09-22 RX ORDER — LABETALOL HYDROCHLORIDE 5 MG/ML
10 INJECTION, SOLUTION INTRAVENOUS ONCE
Status: COMPLETED | OUTPATIENT
Start: 2022-09-22 | End: 2022-09-22

## 2022-09-22 RX ADMIN — ACETAMINOPHEN 1000 MG: 500 TABLET ORAL at 21:28

## 2022-09-22 RX ADMIN — ACETAMINOPHEN 1000 MG: 500 TABLET ORAL at 15:25

## 2022-09-22 RX ADMIN — ACETAMINOPHEN 1000 MG: 500 TABLET ORAL at 09:41

## 2022-09-22 RX ADMIN — SODIUM CHLORIDE: 9 INJECTION, SOLUTION INTRAVENOUS at 21:32

## 2022-09-22 RX ADMIN — ATORVASTATIN CALCIUM 10 MG: 10 TABLET, FILM COATED ORAL at 09:43

## 2022-09-22 RX ADMIN — DIAZEPAM 2 MG: 2 TABLET ORAL at 02:39

## 2022-09-22 RX ADMIN — ENOXAPARIN SODIUM 40 MG: 100 INJECTION SUBCUTANEOUS at 21:28

## 2022-09-22 RX ADMIN — POLYETHYLENE GLYCOL 3350 17 G: 17 POWDER, FOR SOLUTION ORAL at 09:43

## 2022-09-22 RX ADMIN — CYANOCOBALAMIN TAB 1000 MCG 500 MCG: 1000 TAB at 09:41

## 2022-09-22 RX ADMIN — DIAZEPAM 2 MG: 2 TABLET ORAL at 23:11

## 2022-09-22 RX ADMIN — SODIUM CHLORIDE: 9 INJECTION, SOLUTION INTRAVENOUS at 08:27

## 2022-09-22 RX ADMIN — LABETALOL HYDROCHLORIDE 10 MG: 5 INJECTION, SOLUTION INTRAVENOUS at 23:04

## 2022-09-22 RX ADMIN — OXYCODONE 10 MG: 5 TABLET ORAL at 08:24

## 2022-09-22 RX ADMIN — Medication 10 ML: at 23:03

## 2022-09-22 ASSESSMENT — PAIN SCALES - GENERAL
PAINLEVEL_OUTOF10: 6
PAINLEVEL_OUTOF10: 4
PAINLEVEL_OUTOF10: 4
PAINLEVEL_OUTOF10: 3
PAINLEVEL_OUTOF10: 7

## 2022-09-22 ASSESSMENT — PAIN DESCRIPTION - ORIENTATION
ORIENTATION: RIGHT

## 2022-09-22 ASSESSMENT — PAIN DESCRIPTION - DESCRIPTORS
DESCRIPTORS: ACHING
DESCRIPTORS: ACHING

## 2022-09-22 ASSESSMENT — PAIN DESCRIPTION - LOCATION
LOCATION: LEG

## 2022-09-22 NOTE — CARE COORDINATION
CM provided patient with list of SNF facilities that her insurance will cover.      Electronically signed by Lexy Sousa RN on 9/22/2022 at 3:19 PM

## 2022-09-22 NOTE — PROGRESS NOTES
Pt meets d/c criteria for phase 1 in PACU and has been seen by anesthesia. Ok to transfer to [address] [place] [address]. Will alert anyone in waiting room for them and the nursing unit if applicable. Will continue to monitor for safety and comfort.     Shamika Mclean BSN, RN  Pre-Op/Recovery   Same Day Surgery

## 2022-09-22 NOTE — PROGRESS NOTES
[facility]   Phone: [phone]    Occupational Therapy    [x] Initial Evaluation            [] Daily Treatment Note         [] Discharge Summary      Patient: Aleena Colby   : 1950   MRN: [id number]   Date of Service:  2022    Admitting Diagnosis:  Closed fracture of distal end of right femur, unspecified fracture morphology, initial encounter [facility])  Current Admission Summary: Pt admitted with fall 22, found to have (R) distal femur fx, s/p ORIF 22  Past Medical History:  has a past medical history of Arthritis, Hyperlipidemia, and Hypertension. Past Surgical History:  has a past surgical history that includes Middle ear surgery; Varicose vein surgery; and Total knee arthroplasty (Right, 2020). Discharge Recommendations: Aleena Colby scored a 16/24 on the AM-PAC ADL Inpatient form. Current research shows that an AM-PAC score of 17 or less is typically not associated with a discharge to the patient's home setting. Based on the patient's AM-PAC score and their current ADL deficits, it is recommended that the patient have 5-7 sessions per week of Occupational Therapy at d/c to increase the patient's independence. At this time, this patient demonstrates complex nursing, medical, and rehabilitative needs, and would benefit from intensive rehabilitation services upon discharge from the Inpatient setting. This patient demonstrates the ability to participate in and benefit from an intensive therapy program with a coordinated interdisciplinary team approach to foster frequent, structured, and documented communication among disciplines, who will work together to establish, prioritize, and achieve treatment goals. Please see assessment section for further patient specific details. If patient discharges prior to next session this note will serve as a discharge summary. Please see below for the latest assessment towards goals.       DME Required For Discharge: DME to be determined at next level of care    Precautions/Restrictions: high fall risk  Weight Bearing Restrictions: weight bearing as tolerated  [] Right Upper Extremity  [] Left Upper Extremity [x] Right Lower Extremity  [] Left Lower Extremity     Required Braces/Orthotics: hinged knee brace locked in extension   [x] Right  [] Left  Positional Restrictions: No ROM of R knee, requires hinged knee brace OOB, allowed to take breaks while in bed or in recliner with legs extended per Ortho NP    Pre-Admission Information   Lives With: alone    Type of Home: house  Home Layout: quad level   Home Access:  1 step to enter without rails   Bathroom Layout: walk in shower  Bathroom Equipment: shower chair, Comment: suction grab bar  Toilet Height: standard height  Home Equipment: rolling walker, single point cane  Transfer Assistance: Independent without use of device  Ambulation Assistance:Independent without use of device  ADL Assistance: independent with all ADL's  IADL Assistance: independent with homemaking tasks  Active :        [x] Yes  [] No  Hand Dominance: [] Left  [] Right  Current Employment: unemployed  Hobbies: Reading  Recent Falls: 1 other fall besides one for admission    Examination   Vision:   Vision Gross Assessment: Impaired and Vision Corrective Device: wears glasses for reading  Hearing:   hard of hearing, left hearing aid, right hearing aid  Sensation:   WFL  ROM:   (B) UE AROM WFL  Strength:   (B) UE strength grossly WFL    Decision Making: medium complexity  Clinical Presentation: evolving      Subjective  General: Pt supine in bed with aid present in the room upon arrival. Agreeable to therapy. Pt presents with increased anxiety throughout session. Pain: 5/10. Location: R leg.   Pain Interventions: RN notified        Activities of Daily Living  Basic Activities of Daily Living  Upper Extremity Bathing: stand by assistance Comment: Seated at EOB  Lower Extremity Bathing: minimal assistance Comment: A for feet   Upper Extremity Dressing: minimal assistance Comment: A for donning down  Lower Extremity Dressing: dependent Comment: Donning socks  Toileting: contact guard assistance Comment: At [facility]. Instrumental Activities of Daily Living  No IADL completed on this date. Functional Mobility  Bed Mobility  Supine to Sit: minimal assistance, Comment A for R leg  Scooting: set up assistance  Comments:  Transfers  Sit to stand transfer:minimal assistance  Stand to sit transfer: minimal assistance  Stand step transfer: 2 person assistance with Min A   Comments: Stand step transfer from EOB > BSC > recliner with min Ax2 and use of RW. VC for sequencing and hand placement. Functional Mobility:  Sitting Balance: stand by assistance, Comment Leg rest for R leg. Standing Balance: minimal assistance, Comment With use of RW for stability. Other Therapeutic Interventions    Functional Outcomes  -PAC Inpatient Daily Activity Raw Score: 16    Cognition  WFL  Orientation:    alert and oriented x 4  Command Following:   [facility]     Education  Barriers To Learning: none  Patient Education: patient educated on goals, OT role and benefits, plan of care, ADL adaptive strategies, IADL safety, weight-bearing education, proper use of assistive device/equipment, transfer training, discharge recommendations  Learning Assessment:  patient verbalizes and demonstrates understanding    Assessment  Activity Tolerance: Pt was very anxious throughout session. Required frequent reassurance. Impairments Requiring Therapeutic Intervention: decreased functional mobility, decreased ADL status, decreased ROM, decreased strength, decreased safety awareness, decreased endurance, decreased IADL, increased pain  Prognosis: good  Clinical Assessment: Pt present below baseline d/t above deficits. Currently, pt is dependent in LB dressing, min A in bed mobility, and is Min Ax2 for stand step transfers.  Prior to admission pt was IND in all ADLs/IADLs. Pt would benefit from continued OT services to facilitate return to PLOF. Safety Interventions: patient left in chair, chair alarm in place, call light within reach, patient at risk for falls, and nurse notified    Plan  Frequency: 7 x/week  Current Treatment Recommendations: strengthening, ROM, balance training, functional mobility training, transfer training, endurance training, patient/caregiver education, ADL/self-care training, IADL training, and safety education    Goals  Short Term Goals:  Time Frame: By discharge  Patient will complete lower body ADL at minimal assistance   Patient will complete toileting at stand by assistance   Patient will complete functional transfers at contact guard assistance   Patient will complete functional mobility at moderate assistance   Patient will increase functional standing balance to SBA for improved ADL completion    Therapy Session Time     Individual Group Co-treatment   Time In    1157   Time Out    1320   Minutes    83        Timed Code Treatment Minutes:   68  Total Treatment Minutes:  83     Alexander Proctor S/OT  Electronically Signed By: Marc Rodriguez OT    OT provided direct supervision to student, facilitated in making skilled judgements throughout duration of session.     LAUREL Shelton OTR/L [id number]

## 2022-09-22 NOTE — PROGRESS NOTES
Pt arrived to PACU from OR in stable condition and is alert. Pt can move extremities to command. Respirations are even on 6L O2 per Sm. Skin warm, dry, and with appropriate for ethnicity color. Abd is soft. Pain is tolerable at this time.   Right leg surgical site(s) intact with dressing= 6 incisions per OR team report, stapled, DSD, Ace, immobilizer  Postop Xray done      S/P: open reduction internal fixation of right distal femur with Dr. Marlin Cartwright at [address], RN  Pre-Op/Recovery   Same Day Surgery

## 2022-09-22 NOTE — PROGRESS NOTES
Physical Therapy    [facility]   Phone: [phone]    Physical Therapy    [x] Initial Evaluation            [] Daily Treatment Note         [] Discharge Summary      Patient: Seun Vinson   : 1950   MRN: [id number]   Date of Service:  2022  Admitting Diagnosis: Closed fracture of distal end of right femur, unspecified fracture morphology, initial encounter [facility])  Current Admission Summary: Total knee replacement knee landed underneath her as she fell down two stairs. Knee visibly deformed  Pt reports not hitting her head. Pt reports very little pain with no movement, excruciating pain with movement. 10/10   OPEN REDUCTION INTERNAL FIXATION OF RIGHT DISTAL FEMUR  Past Medical History:  has a past medical history of Arthritis, Hyperlipidemia, and Hypertension. Past Surgical History:  has a past surgical history that includes Middle ear surgery; Varicose vein surgery; and Total knee arthroplasty (Right, 2020). Discharge Recommendations: Seun Vinson scored a 14/24 on the AM-PAC short mobility form. Current research shows that an AM-PAC score of 17 or less is typically not associated with a discharge to the patient's home setting. Based on the patient's AM-PAC score and their current functional mobility deficits, it is recommended that the patient have 5-7 sessions per week of Physical Therapy at d/c to increase the patient's independence. At this time, this patient demonstrates complex nursing, medical, and rehabilitative needs, and would benefit from intensive rehabilitation services upon discharge from the Inpatient setting. This patient demonstrates the ability to participate in and benefit from an intensive therapy program with a coordinated interdisciplinary team approach to foster frequent, structured, and documented communication among disciplines, who will work together to establish, prioritize, and achieve treatment goals.  Please see assessment section for further patient specific details. If patient discharges prior to next session this note will serve as a discharge summary. Please see below for the latest assessment towards goals.     DME Required For Discharge: DME to be determined at next level of care  Precautions/Restrictions: high fall risk  Weight Bearing Restrictions: weight bearing as tolerated  [] Right Upper Extremity        [] Left Upper Extremity         [x] Right Lower Extremity         [] Left Lower Extremity             Required Braces/Orthotics: hinged knee brace locked in extension                   [x] Right            [] Left  Positional Restrictions: No ROM of R knee, requires hinged knee brace OOB, allowed to take breaks from brace while in bed or in recliner with legs extended per Ortho NP    Pre-Admission Information   Lives With: alone                     Type of Home: house  Home Layout: quad level   Home Access:  1 step to enter without rails   Bathroom Layout: walk in shower  Bathroom Equipment: shower chair, Comment: suction grab bar  Toilet Height: standard height  Home Equipment: rolling walker, single point cane  Transfer Assistance: Independent without use of device  Ambulation Assistance:Independent without use of device  ADL Assistance: independent with all ADL's  IADL Assistance: independent with homemaking tasks  Active :        [x] Yes                 [] No  Hand Dominance: [] Left                 [] Right  Current Employment: unemployed  Hobbies: Reading  Recent Falls: 1 other fall besides one for admission     Examination   Vision:   Vision Gross Assessment: Impaired and Vision Corrective Device: wears glasses for reading  Hearing:   hard of hearing, left hearing aid, right hearing aid  Observation:   Hinged knee brace donned upon arrival   Posture:   WFL   Sensation:   WFL  ROM:   R LE ROM restricted   L LE ROM WFL   Strength:   R LE strength not formally tested  L LE strength 4/5   Decision Making: medium complexity  Clinical Presentation: evolving      Subjective  General: Pt supine in bed upon arrival. Requesting to bath and change gown. Pt agreeable to PT/OT eval. Pt very anxious throughout session   Pain: 5/10. Location: R LE  Pain Interventions: RN notified       Functional Mobility  Bed Mobility  Supine to Sit: minimal assistance  Scooting: stand by assistance  Comments: Assist with R LE   Transfers  Sit to stand transfer: minimal assistance  Stand to sit transfer: minimal assistance  Stand step transfer: 2 person assistance with min A   Comments: Cues for sequencing and safe hand placement. Stand step transfer from EOB>BSC>recliner with min A x2 RW   Ambulation  Comments:  not completed this date   Stair Mobility  Stair mobility not completed on this date. Comments:  Wheelchair Mobility:  No w/c mobility completed on this date. Comments:  Balance  Static Sitting Balance: fair (+): maintains balance at SBA/supervision without use of UE support  Dynamic Sitting Balance: fair (+): maintains balance at SBA/supervision without use of UE support  Static Standing Balance: poor (+): requires min (A) to maintain balance  Dynamic Standing Balance: poor (+): requires min (A) to maintain balance  Comments: Pt sat EOB ~10 mins completing ADLs with OT (see OT notes). Pt sat at [facility] ~12 mins for BM, able to complete pericare without assistance. Other Therapeutic Interventions    Functional Outcomes  AM-PAC Inpatient Mobility Raw Score : 14              Cognition  WFL  Orientation:    alert and oriented x 4  Command Following:   [facility]    Education  Barriers To Learning: emotional  Patient Education: patient educated on goals, PT role and benefits, plan of care, discharge recommendations  Learning Assessment:  patient verbalizes and demonstrates understanding    Assessment  Activity Tolerance: Pt very anxious throughout session. Requires frequent reassurance.  Pt self limiting   Impairments Requiring Therapeutic Intervention: decreased functional mobility, decreased ROM, decreased strength, decreased safety awareness, decreased endurance, decreased balance, increased pain  Prognosis: good  Clinical Assessment: Pt s/p ORIF of R distal femur after fall at home. At this time, pt requires min A to complete bed mobility and min A x2 to complete stand step transfers with RW. Pt is very anxious and self limiting this date, requires constant encouragement and re-assurance. Pt would continue to benefit from skilled PT in order to safely return to [facility].    Safety Interventions: patient left in chair, chair alarm in place, call light within reach, gait belt, patient at risk for falls, and nurse notified    Plan  Frequency: 7 x/week  Current Treatment Recommendations: strengthening, balance training, functional mobility training, transfer training, gait training, stair training, endurance training, and pain management    Goals  Patient Goals: none stated    Short Term Goals:  Time Frame: upon discharge   Patient will complete bed mobility at Independent   Patient will complete transfers at stand by assistance   Patient will ambulate 15 ft with use of LRAD at modified independent  Patient will ascend/descend 1 stairs without use of HR at contact guard assistance    Therapy Session Time      Individual Group Co-treatment   Time In     1157   Time Out     1320   Minutes     83     Timed Code Treatment Minutes:   68  Total Treatment Minutes:  83       Electronically Signed By: Yony Curtis, [address], [address] [place] [address] 18

## 2022-09-22 NOTE — PLAN OF CARE
Problem: Discharge Planning  Goal: Discharge to home or other facility with appropriate resources  Outcome: Progressing  Flowsheets (Taken 9/21/2022 2134 by Krish Kuar RN)  Discharge to home or other facility with appropriate resources: Identify barriers to discharge with patient and caregiver     Problem: Pain  Goal: Verbalizes/displays adequate comfort level or baseline comfort level  Outcome: Progressing     Problem: Safety - Adult  Goal: Free from fall injury  Outcome: Progressing     Problem: Skin/Tissue Integrity - Adult  Goal: Skin integrity remains intact  Outcome: Progressing  Flowsheets (Taken 9/21/2022 2134 by Krish Kaur RN)  Skin Integrity Remains Intact: Monitor for areas of redness and/or skin breakdown  Goal: Incisions, wounds, or drain sites healing without S/S of infection  Outcome: Progressing  Flowsheets (Taken 9/21/2022 2134 by Krish Kaur RN)  Incisions, Wounds, or Drain Sites Healing Without Sign and Symptoms of Infection: ADMISSION and DAILY: Assess and document risk factors for pressure ulcer development     Problem: Musculoskeletal - Adult  Goal: Return mobility to safest level of function  Outcome: Progressing  Flowsheets (Taken 9/21/2022 2134 by Krish Kaur RN)  Return Mobility to Safest Level of Function: Assess patient stability and activity tolerance for standing, transferring and ambulating with or without assistive devices  Goal: Maintain proper alignment of affected body part  Outcome: Progressing  Flowsheets (Taken 9/21/2022 2134 by Krish Kaur RN)  Maintain proper alignment of affected body part: Support and protect limb and body alignment per provider's orders  Goal: Return ADL status to a safe level of function  Outcome: Progressing  Flowsheets (Taken 9/21/2022 2134 by Krihs Kaur RN)  Return ADL Status to a Safe Level of Function: Administer medication as ordered     Problem: Genitourinary - Adult  Goal: Absence of urinary retention  Outcome: Progressing  Flowsheets (Taken 9/21/2022 2134 by Zeny Knight RN)  Absence of urinary retention: Assess patients ability to void and empty bladder  Goal: Urinary catheter remains patent  Outcome: Progressing  Flowsheets (Taken 9/21/2022 2134 by Zeny Knight RN)  Urinary catheter remains patent: Assess patency of urinary catheter     Problem: Skin/Tissue Integrity  Goal: Absence of new skin breakdown  Description: 1. Monitor for areas of redness and/or skin breakdown  2. Assess vascular access sites hourly  3. Every 4-6 hours minimum:  Change oxygen saturation probe site  4. Every 4-6 hours:  If on nasal continuous positive airway pressure, respiratory therapy assess nares and determine need for appliance change or resting period.   Outcome: Progressing

## 2022-09-22 NOTE — PROGRESS NOTES
[facility] Orthopedic Surgery   Progress Note    CHIEF COMPLAINT/DIAGNOSIS: S/p RIGHT distal femur periprosthetic fx ORIF     SUBJECTIVE: The patient is sitting up in the bed; describes mild to moderate thigh/knee pain. Denies paresthesias. Has not yet seen therapy. Dixon remains in place. Anxiety slightly improved. OBJECTIVE  Physical    VITALS:  BP (!) 156/81   Pulse 90   Temp 97.6 °F (36.4 °C) (Oral)   Resp 20   Ht 5' 8\" (1.727 m)   Wt 140 lb (63.5 kg)   LMP 02/14/1992   SpO2 99%   BMI 21.29 kg/m²     GENERAL: Alert and oriented x3, in no acute distress. MUSCULOSKELETAL: Intact DF/PF in operative leg. INCISION:  Covered with post-op dressing and ACE; clean, dry and intact. Swelling as expected; compartments remain easily compressible and reasonably supple. ROM: To operative knee deferred. Sensory:  Intact to light touch in tibial and peroneal distributions. Vascular:   2+ DP pulse with brisk cap refill. Data    ALL MEDICATIONS HAVE BEEN REVIEWED    CBC:   Recent Labs     09/19/22  2101 09/21/22  0455 09/22/22  0523   WBC 9.7 4.7 8.4   HGB 11.1* 8.1* 8.6*   HCT 33.4* 24.4* 25.8*    202 225     BMP:   Recent Labs     09/20/22  0452 09/21/22  0455 09/22/22  0523   * 133* 129*   K 4.3 3.8 3.6   CL 98* 102 101   CO2 24 22 19*   BUN 16 8 8   CREATININE <0.5* <0.5* <0.5*     INR:   Recent Labs     09/21/22  0455   INR 1.11     Post-op films show stable total knee arthroplasty construct with IM nail and lateral locking plate and screws in good position without acute complication. ASSESSMENT:  S/p ORIF RIGHT distal femur periprosthetic fx (9/21/22), POD#1  Anxiety  Acute post-op blood loss anemia as expected  Hyponatremia  HTN  HLD    PLAN:   Will change dressing tomorrow. Keep dixon in until at least after seen by therapy. - WB status:  OK for weight bearing as tolerated through operative leg; NO ROM x 2 weeks or until repeat x rays at earliest; Reviewed post op precautions.   - DVT prophylaxis: Lovenox as inpt; d/c on   - PT/OT  - Pain Control: Current regimen. Due to orthopaedic surgical procedure/condition, patient may require pain medication for up to 6-8 weeks. - Expected post op acute blood loss anemia: H/H today: 8.6/25.8 (s/p 1 unit PRBC on 9/21)  - Dispo: await therapy eval; expect in house until tomorrow. Follow-up with Dr. Israel Patricio in 2 weeks. Office # [phone]  No future appointments.     Tiff Modi, ERIN - CNP  9/22/2022  9:35 AM

## 2022-09-22 NOTE — CONSULTS
PSYCHIATRY CONSULT, INITIAL EVALUATION    Attending Provider:  Hugh Donis MD    CC/Reason for Consult: anxiety    HPI:   context: 72 yo F with no past psych hx, here with rt knee pain and found to have closed fracture of rt distal femur that required surgery. Shes been noted to be anxious and requiring prn diazepam.     associated symptoms: occasionally feels anxious and poorly motivated. She can't tell me what she usually worries about. Here in the hospital she is worried about her recovery from her fracture, but is hopeful and staying motivated. She denies feeling overly sad, hopeless. Denies suicidal ideation. She often feels lonliness. modifying factors: she drinks nightly atleast 2 drinks - she has been doing this for many yrs  Timing: chronic, with a little acute exacerbation due to this hospitalization  duration: 5 yrs since   in 2017  severity: moderate    ROS:   Gen: no fevers or chills, HEENT: no vision or hearing problems, no HA CV: no cp, no palpitations RESP: no dyspnea : no dysuria MSK: +leg pain GI: no n/v/d, no abd pain  SKIN: no rashes NEURO:  no weakness, no numbness ENDO: no weight changes    Past Psychiatric History:   Hosp: denies  Diagnoses: denies  Med trials: denies  Outpt: denies  NSSI: denies  Suicide Attempts: denies    Substance Use History:  Nicotine: denies  Alcohol: 2 drinks of wine or gin and tonic at night. Illicits: denies    Past Medical History:   Diagnosis Date    Arthritis     knee    Hyperlipidemia     Hypertension        Social/Developmental History:   Relationship:  since 2017  Children: 4 children  Supports: family is supportive.    Housing: lives alone  Occ/Inc: retired from secretarial work    Family History   Problem Relation Age of Onset    Mental Illness Mother     Depression Mother     High Blood Pressure Father     Cancer Paternal Aunt     Breast Cancer Paternal Aunt      Allergies   Allergen Reactions    No Known Allergies Medications:  Scheduled Meds:   atorvastatin  10 mg Oral Daily    vitamin B-12  500 mcg Oral Daily    sodium chloride flush  10 mL IntraVENous 2 times per day    enoxaparin  40 mg SubCUTAneous Nightly    acetaminophen  1,000 mg Oral TID    polyethylene glycol  17 g Oral Daily       OBJECTIVE:  BP: (!) 156/81, Pain 0-10: Pain Level: 4;     MSE:   Appearance    alert, cooperative  Motor: No abnormal movements, tics or mannerisms. Speech    spontaneous, normal rate, and normal volume  Language    0 - no aphasia, normal  Mood/Affect    ok / anxious affect  Thought Process    linear, goal directed, and coherent  Thought Content    intact , no suicidal ideation  Associations    logical connections  Attention/Concentration    intact  Orientation    oriented to person, place, time, and general circumstances  Memory    recent and remote memory intact  Fund of Knowledge    intact  Insight/Judgement    Good / Intact    Labs:     Lab Results   Component Value Date    CREATININE <0.5 (L) 09/22/2022    BUN 8 09/22/2022     (L) 09/22/2022    K 3.6 09/22/2022     09/22/2022    CO2 19 (L) 09/22/2022     Lab Results   Component Value Date    WBC 8.4 09/22/2022    HGB 8.6 (L) 09/22/2022    HCT 25.8 (L) 09/22/2022    MCV 92.4 09/22/2022     09/22/2022       ASSESSMENT:   70 yo F with anxiety. Seems it has been a long standing issue. Current contributing factors could be her medical issues, pain, possible delirium at night givens several risk factors for this, as well as alcohol use/withdrawal (almita if she is minimizing her use). There probably is some depression as well. Unspecified anxiety disorder    RECOMMENDATIONS:   1. I would not initate any long term meds for anxiety until her physical recovery improves. She is also againstbeing on anything. 2. Diazepam 2mg prn is ok for the next couple days then I would try to stop it to reduce risk of confusion, falls, sedation.  If there is even a little ETOH withdrawal this could be helpful in the short term as mgmt of her anxiety    Provided supportive counseling and education about anxiety and its treatment. Dispo: does not require inpatient psych    Thank you for this consult, please call the psychiatry consult line for further questions. I will sign off at this time.              Anneliese Cruz MD   Psychiatrist

## 2022-09-22 NOTE — PROGRESS NOTES
Hospitalist Progress Note      PCP: Josi Stevens MD    Date of Admission: 9/19/2022    Chief Complaint:   Right knee pain    Hospital Course: Vashti Chi is a 71 y.o. female who presents to ED for evaluation of right knee pain s/p mechanical fall. Patient reports that she slipped on the second to last step while going down the stairs and landed with her right leg underneath and behind her. She reports she has been unable to stand or bend her knee. She reports severe pain with movement but minimal pain with rest.  She denies any other injury. She denies hitting her head. She denies dizziness or other symptoms prior to fall and reports fall was entirely mechanical in nature. She denies fever, chest pain, shortness of breath, cough, abdominal pain, nausea, vomiting, diarrhea, constipation, urinary symptoms. X-ray in ED notable for displaced comminuted fracture of the distal femur. Fracture was reduced in ED and splint was placed. Ortho consulted and requested CT.     Subjective:   Still has right knee pain   Has anxiety- needed daizpam last night      Medications:  Reviewed    Infusion Medications    sodium chloride      sodium chloride      sodium chloride      sodium chloride 75 mL/hr at 09/22/22 0827     Scheduled Medications    atorvastatin  10 mg Oral Daily    vitamin B-12  500 mcg Oral Daily    sodium chloride flush  10 mL IntraVENous 2 times per day    enoxaparin  40 mg SubCUTAneous Nightly    acetaminophen  1,000 mg Oral TID    polyethylene glycol  17 g Oral Daily     PRN Meds: sodium chloride, sodium chloride, sodium chloride flush, sodium chloride, magnesium hydroxide, acetaminophen **OR** acetaminophen, labetalol, oxyCODONE **OR** oxyCODONE, tiZANidine, diazePAM, ondansetron **OR** ondansetron, HYDROmorphone **OR** HYDROmorphone      Intake/Output Summary (Last 24 hours) at 9/22/2022 0902  Last data filed at 9/22/2022 1528  Gross per 24 hour   Intake 3868.62 ml   Output 2425 ml Net 1443.62 ml         Physical Exam Performed:    BP (!) 156/81   Pulse 90   Temp 97.6 °F (36.4 °C) (Oral)   Resp 20   Ht 5' 8\" (1.727 m)   Wt 140 lb (63.5 kg)   LMP 02/14/1992   SpO2 99%   BMI 21.29 kg/m²     General appearance:  Awake, alert, no apparent distress  HEENT:  Normocephalic, atraumatic without obvious deformity. PERRL. EOM intact. Conjunctivae/corneas clear. Neck: Supple, with full range of motion. No JVD. Trachea midline. Respiratory:  Clear to auscultation bilaterally without rales, wheezes, or rhonchi. Normal respiratory effort. Cardiovascular:  Regular rate and rhythm without murmurs, rubs or gallops. Abdomen: Soft, NT, ND, without rebound or guarding. Normal bowel sounds. Extremities:  Right knee immobilizer in place. No clubbing, cyanosis, or edema bilaterally. +2 palpable pulses, equal bilaterally. Capillary refill brisk,< 3 seconds   Skin: No rashes or lesions. Warm/dry. Neurologic:  Neurovascularly intact without any focal sensory/motor deficits. Cranial nerves: II-XII intact, grossly non-focal. Alert and oriented x 3. Normal speech. Psychiatric:  Thought content appropriate, normal insight. Labs:   Recent Labs     09/19/22 2101 09/21/22 0455   WBC 9.7 4.7   HGB 11.1* 8.1*   HCT 33.4* 24.4*    202       Recent Labs     09/20/22  0452 09/21/22  0455 09/22/22  0523   * 133* 129*   K 4.3 3.8 3.6   CL 98* 102 101   CO2 24 22 19*   BUN 16 8 8   CREATININE <0.5* <0.5* <0.5*   CALCIUM 8.7 8.1* 8.0*       Recent Labs     09/19/22  2101   AST 25   ALT 19   BILITOT 0.4   ALKPHOS 44       Recent Labs     09/21/22  0455   INR 1.11       No results for input(s): Prentis Revels in the last 72 hours.     Urinalysis:      Lab Results   Component Value Date/Time    NITRU Negative 01/17/2020 01:53 PM    BLOODU Negative 01/17/2020 01:53 PM    SPECGRAV 1.010 01/17/2020 01:53 PM    GLUCOSEU Negative 01/17/2020 01:53 PM       Radiology:  XR FEMUR RIGHT (MIN 2 VIEWS) Final Result   Postoperative changes of the right femur. XR FEMUR RIGHT (MIN 2 VIEWS)   Final Result      CT FEMUR RIGHT WO CONTRAST   Final Result   1. Comminuted periprosthetic distal femur fracture, with extension through   the lateral femoral condyle. Associated lipohemarthrosis with additional   hemorrhage and edema in the vastus intermedius muscle. XR KNEE RIGHT (1-2 VIEWS)   Final Result   Mild improved angulation of the comminuted displaced fracture of the distal   femoral metadiaphysis. XR HIP 2-3 VW W PELVIS RIGHT   Final Result      Osteoarthritic change of the right hip. No evidence of fracture or   malalignment. XR KNEE RIGHT (1-2 VIEWS)   Final Result      Comminuted fracture of the distal femoral shaft above a uninvolved total knee   arthroplasty. The fracture is displaced and angulated anteriorly. ASSESSMENT/PLAN:     Closed fracture of right distal femur on x-ray. comminuted fracture of the distal femur  Reduced in ED. Splint in place  Pain control  To OR today       Hyponatremia  Na 126-->133  Hold home lisinopril/HCTZ   IVF with NS  Monitor Na qd     HTN  Hold home lisinopril/HCTZ for hyponatremia  IV Labetalol PRN     HLD  Continue home lovastatin (sub w/ atorvastatin)    Anxiety  PRN Diazepam  Consult to psych to advice        DVT prophylaxis: Lovenox  Probiotic if on abx: N/A     Diet: ADULT DIET;  Regular  Diet NPO Exceptions are: Sips of Water with Meds  Code Status: Full Code     Consults:  IP CONSULT TO ORTHOPEDIC SURGERY     Disposition: Admit to Inpatient     ELOS: Greater than two midnights due to medical therapy     Kimberley Angel MD

## 2022-09-22 NOTE — CONSULTS
negative for frequency, dysuria, urinary incontinence, decreased urine volume, and hematuria. HEMATOLOGIC/LYMPHATIC: negative for easy bruising, bleeding and lymphadenopathy. ALLERGIC/IMMUNOLOGIC: negative for recurrent infections, angioedema, anaphylaxis and drug reactions. ENDOCRINE: negative for weight changes and diabetic symptoms including polyuria, polydipsia and polyphagia. MUSCULOSKELETAL: positive for pain, joint swelling, decreased range of motion and muscle weakness. NEUROLOGICAL: negative for headaches, slurred speech, unilateral weakness. PSYCHIATRIC/BEHAVIORAL: negative for hallucinations, behavioral problems, confusion and agitation. All pertinent positives are noted in the HPI. Physical Examination:  Vitals: Patient Vitals for the past 24 hrs:   BP Temp Temp src Pulse Resp SpO2   09/22/22 0815 (!) 156/81 97.6 °F (36.4 °C) Oral 90 20 99 %   09/22/22 0010 -- -- -- -- 18 --   09/21/22 2346 (!) 149/67 97.9 °F (36.6 °C) Oral 90 16 97 %   09/21/22 2134 (!) 149/72 97.9 °F (36.6 °C) Oral (!) 107 18 99 %   09/21/22 2101 (!) 166/77 97.4 °F (36.3 °C) Oral 100 16 --   09/21/22 2045 (!) 169/73 -- -- 89 -- 97 %   09/21/22 2040 (!) 179/76 -- -- 88 13 96 %   09/21/22 2030 (!) 181/74 -- -- 97 15 98 %   09/21/22 2015 (!) 192/83 -- -- 75 22 100 %   09/21/22 2010 (!) 183/100 97 °F (36.1 °C) Temporal 88 15 100 %   09/21/22 2005 (!) 188/84 -- -- 94 19 100 %   09/21/22 2000 (!) 183/86 -- -- 94 11 100 %   09/21/22 1956 (!) 181/82 97 °F (36.1 °C) Temporal 98 14 100 %   09/21/22 1505 (!) 153/79 97.7 °F (36.5 °C) Temporal 81 16 99 %   09/21/22 1345 (!) 153/75 98.9 °F (37.2 °C) Oral 80 18 96 %   09/21/22 1220 -- -- -- -- -- 96 %     Psych: Stable mood, normal judgement, normal affect. Const: No distress  Eyes: Conjunctiva noninjected, no icterus noted; pupils equal, round. HENT: Atraumatic, normocephalic; Oral mucosa moist  Neck: Trachea midline, neck supple. No thyromegaly noted.   CV: No audible murmurs  Resp: No increased WOB, no audible wheezing   GI: Nondistended   Neuro: Alert, oriented, appropriate. Skin: No visible abnormalities  MSK: Right knee in extension brace, clean Ace dressing  Ext: No significant edema appreciated. No varicosities. Lab Results   Component Value Date    WBC 8.4 09/22/2022    HGB 8.6 (L) 09/22/2022    HCT 25.8 (L) 09/22/2022    MCV 92.4 09/22/2022     09/22/2022     Lab Results   Component Value Date    INR 1.11 09/21/2022    INR 1.02 01/17/2020    PROTIME 14.2 09/21/2022    PROTIME 11.8 01/17/2020     Lab Results   Component Value Date    CREATININE <0.5 (L) 09/22/2022    BUN 8 09/22/2022     (L) 09/22/2022    K 3.6 09/22/2022     09/22/2022    CO2 19 (L) 09/22/2022     Lab Results   Component Value Date    ALT 19 09/19/2022    AST 25 09/19/2022    ALKPHOS 44 09/19/2022    BILITOT 0.4 09/19/2022       Most recent imaging studies revealed   EXAMINATION:   2 XRAY VIEWS OF THE RIGHT KNEE       9/19/2022 10:44 pm       COMPARISON:   09/19/2022       HISTORY:   ORDERING SYSTEM PROVIDED HISTORY: distal femur fracture, post-reduction   TECHNOLOGIST PROVIDED HISTORY:   Reason for exam:->distal femur fracture, post-reduction   Reason for Exam: POST REDUCTION RT KNEE       FINDINGS:   Again identified is a comminuted fracture of the distal femoral shaft. There   is a total knee arthroplasty noted as well, with a moderate joint effusion   with a fat fluid level noted. Mild decreased angulation at the fracture site   compared to the previous examination. Impression   Mild improved angulation of the comminuted displaced fracture of the distal   femoral metadiaphysis.          EXAM:       XR Right Knee, 1 or 2 Views       EXAM DATE/TIME:       9/19/2022 7:46 pm       CLINICAL HISTORY:       ORDERING SYSTEM PROVIDED  dislocation/fracture  TECHNOLOGIST PROVIDED   HISTORY:  Reason for exam:->dislocation/fracture  Reason for Exam: fall, ?   dislocation, fx TECHNIQUE:       Frontal and/or lateral views of the right knee. COMPARISON:       09/17/2021       FINDINGS:       Bones/joints:  Comminuted fracture of the distal femoral shaft above a   uninvolved total knee arthroplasty. The fracture is displaced and angulated   anteriorly. No dislocation. Soft tissues:  No acute findings. Impression       Comminuted fracture of the distal femoral shaft above a uninvolved total knee   arthroplasty. The fracture is displaced and angulated anteriorly       The above laboratory data have been reviewed. The above imaging data have been reviewed. The above medical testing have been reviewed. Body mass index is 21.29 kg/m². Assessment and Plan:  Right distal femur fracture: S/p ORIF on 9/21 with Dr. Uriel Parra.  NWB. No ROM. PT/OT. Pain control. Hyponatremia  Acute anemia  HTN  HLD    Dispo: Patient is an appropriate ARU candidate but unfortunately she would not be approved by her insurance given her admission diagnoses. Suggest SNF disposition as this will also allow her more time for her leg to heal prior to returning home which is a multilevel home and living alone. Liaison to discuss with CM. We will sign off. Thank you for the consultation. Mikie Clemons MD 9/22/2022, 11:00 AM     * This document was created using dictation software. While all precautions were taken to ensure accuracy, errors may have occurred. Please disregard any typographical errors.

## 2022-09-22 NOTE — ANESTHESIA POSTPROCEDURE EVALUATION
Department of Anesthesiology  Postprocedure Note    Patient: Vonnie Norwood  MRN: [id number]  YOB: 1950  Date of evaluation: 9/21/2022      Procedure Summary     Date: 09/21/22 Room / Location: [address]    Anesthesia Start: 1631 Anesthesia Stop: 2005    Procedure: OPEN REDUCTION INTERNAL FIXATION OF RIGHT DISTAL FEMUR (Right) Diagnosis:       Closed fracture of right femur, unspecified fracture morphology, unspecified portion of femur, initial encounter ([facility] [address]      (Closed fracture of right femur, unspecified fracture morphology, unspecified portion of femur, initial encounter ([facility] [address] Javi Mcconnell)    Surgeons: Esperanza Watts MD Responsible Provider: Juan Ramon Salguero MD    Anesthesia Type: general ASA Status: 3          Anesthesia Type: No value filed.     Mary Grace Phase I: Mary Grace Score: 10    Mary Grace Phase II:        Anesthesia Post Evaluation    Patient location during evaluation: PACU  Patient participation: complete - patient participated  Level of consciousness: awake and alert  Airway patency: patent  Nausea & Vomiting: no nausea and no vomiting  Complications: no  Cardiovascular status: hemodynamically stable  Respiratory status: acceptable  Hydration status: stable  Multimodal analgesia pain management approach

## 2022-09-23 PROCEDURE — 6360000002 HC RX W HCPCS: Performed by: PHYSICIAN ASSISTANT

## 2022-09-23 PROCEDURE — 97110 THERAPEUTIC EXERCISES: CPT

## 2022-09-23 PROCEDURE — APPNB45 APP NON BILLABLE 31-45 MINUTES: Performed by: NURSE PRACTITIONER

## 2022-09-23 PROCEDURE — 6370000000 HC RX 637 (ALT 250 FOR IP): Performed by: PHYSICIAN ASSISTANT

## 2022-09-23 PROCEDURE — 97535 SELF CARE MNGMENT TRAINING: CPT

## 2022-09-23 PROCEDURE — 6370000000 HC RX 637 (ALT 250 FOR IP): Performed by: NURSE PRACTITIONER

## 2022-09-23 PROCEDURE — 1200000000 HC SEMI PRIVATE

## 2022-09-23 PROCEDURE — 99024 POSTOP FOLLOW-UP VISIT: CPT | Performed by: NURSE PRACTITIONER

## 2022-09-23 PROCEDURE — 2500000003 HC RX 250 WO HCPCS: Performed by: PHYSICIAN ASSISTANT

## 2022-09-23 PROCEDURE — 2580000003 HC RX 258: Performed by: PHYSICIAN ASSISTANT

## 2022-09-23 PROCEDURE — 97530 THERAPEUTIC ACTIVITIES: CPT

## 2022-09-23 PROCEDURE — 6370000000 HC RX 637 (ALT 250 FOR IP): Performed by: INTERNAL MEDICINE

## 2022-09-23 PROCEDURE — 97116 GAIT TRAINING THERAPY: CPT

## 2022-09-23 RX ORDER — OXYCODONE HYDROCHLORIDE 5 MG/1
5 TABLET ORAL EVERY 4 HOURS PRN
Qty: 42 TABLET | Refills: 0 | Status: SHIPPED | OUTPATIENT
Start: 2022-09-23 | End: 2022-09-30

## 2022-09-23 RX ORDER — ACETAMINOPHEN 500 MG
1000 TABLET ORAL 3 TIMES DAILY
Qty: 84 TABLET | Refills: 0 | Status: SHIPPED | OUTPATIENT
Start: 2022-09-23 | End: 2022-10-17

## 2022-09-23 RX ORDER — ENOXAPARIN SODIUM 100 MG/ML
40 INJECTION SUBCUTANEOUS NIGHTLY
Qty: 26 EACH | Refills: 0 | Status: SHIPPED | OUTPATIENT
Start: 2022-09-23 | End: 2022-10-07 | Stop reason: SDUPTHER

## 2022-09-23 RX ORDER — LISINOPRIL 10 MG/1
10 TABLET ORAL DAILY
Status: DISCONTINUED | OUTPATIENT
Start: 2022-09-23 | End: 2022-09-24

## 2022-09-23 RX ORDER — KETOROLAC TROMETHAMINE 30 MG/ML
30 INJECTION, SOLUTION INTRAMUSCULAR; INTRAVENOUS EVERY 6 HOURS PRN
Status: DISCONTINUED | OUTPATIENT
Start: 2022-09-23 | End: 2022-09-26 | Stop reason: HOSPADM

## 2022-09-23 RX ADMIN — CYANOCOBALAMIN TAB 1000 MCG 500 MCG: 1000 TAB at 09:39

## 2022-09-23 RX ADMIN — OXYCODONE 10 MG: 5 TABLET ORAL at 00:02

## 2022-09-23 RX ADMIN — SODIUM CHLORIDE, PRESERVATIVE FREE 10 ML: 5 INJECTION INTRAVENOUS at 06:08

## 2022-09-23 RX ADMIN — ACETAMINOPHEN 1000 MG: 500 TABLET ORAL at 09:39

## 2022-09-23 RX ADMIN — Medication 10 ML: at 21:27

## 2022-09-23 RX ADMIN — LISINOPRIL 10 MG: 10 TABLET ORAL at 17:16

## 2022-09-23 RX ADMIN — ACETAMINOPHEN 1000 MG: 500 TABLET ORAL at 15:04

## 2022-09-23 RX ADMIN — ENOXAPARIN SODIUM 40 MG: 100 INJECTION SUBCUTANEOUS at 20:38

## 2022-09-23 RX ADMIN — OXYCODONE 10 MG: 5 TABLET ORAL at 20:42

## 2022-09-23 RX ADMIN — OXYCODONE 10 MG: 5 TABLET ORAL at 23:54

## 2022-09-23 RX ADMIN — OXYCODONE 5 MG: 5 TABLET ORAL at 10:01

## 2022-09-23 RX ADMIN — ONDANSETRON 4 MG: 2 INJECTION INTRAMUSCULAR; INTRAVENOUS at 06:08

## 2022-09-23 RX ADMIN — LABETALOL HYDROCHLORIDE 10 MG: 5 INJECTION, SOLUTION INTRAVENOUS at 09:59

## 2022-09-23 RX ADMIN — DIAZEPAM 2 MG: 2 TABLET ORAL at 20:38

## 2022-09-23 RX ADMIN — ACETAMINOPHEN 1000 MG: 500 TABLET ORAL at 20:38

## 2022-09-23 RX ADMIN — ATORVASTATIN CALCIUM 10 MG: 10 TABLET, FILM COATED ORAL at 09:39

## 2022-09-23 RX ADMIN — SODIUM CHLORIDE: 9 INJECTION, SOLUTION INTRAVENOUS at 09:59

## 2022-09-23 ASSESSMENT — PAIN DESCRIPTION - ORIENTATION
ORIENTATION: RIGHT

## 2022-09-23 ASSESSMENT — PAIN SCALES - GENERAL
PAINLEVEL_OUTOF10: 8
PAINLEVEL_OUTOF10: 1
PAINLEVEL_OUTOF10: 7
PAINLEVEL_OUTOF10: 1
PAINLEVEL_OUTOF10: 10
PAINLEVEL_OUTOF10: 10
PAINLEVEL_OUTOF10: 6
PAINLEVEL_OUTOF10: 7

## 2022-09-23 ASSESSMENT — PAIN DESCRIPTION - LOCATION
LOCATION: KNEE
LOCATION: KNEE
LOCATION: LEG
LOCATION: LEG
LOCATION: KNEE

## 2022-09-23 ASSESSMENT — PAIN DESCRIPTION - DESCRIPTORS
DESCRIPTORS: THROBBING
DESCRIPTORS: ACHING;CRAMPING;DISCOMFORT
DESCRIPTORS: ACHING;CRAMPING;DISCOMFORT
DESCRIPTORS: THROBBING

## 2022-09-23 NOTE — CARE COORDINATION
Discharge Planning Note:    PT/OT recommends ARU. Insurance will not cover ARU. Met with the patient. An approved SNF list was provided and reviewed. The following referral was placed at the request of the patient:    - [address] started at 4845 on 09/23/2022    Will continue to follow.     PENG DelcidN RN    [facility]  Phone: [phone]

## 2022-09-23 NOTE — PROGRESS NOTES
[facility]   Phone: [phone]    Occupational Therapy    [] Initial Evaluation            [x] Daily Treatment Note         [] Discharge Summary      Patient: Savage Ceballos   : 1950   MRN: [id number]   Date of Service:  2022    Admitting Diagnosis:  Closed fracture of distal end of right femur, unspecified fracture morphology, initial encounter [facility])  Current Admission Summary: Pt admitted with fall 22, found to have (R) distal femur fx, s/p ORIF 22  Past Medical History:  has a past medical history of Arthritis, Hyperlipidemia, and Hypertension. Past Surgical History:  has a past surgical history that includes Middle ear surgery; Varicose vein surgery; Total knee arthroplasty (Right, 2020); and Femur fracture surgery (Right, 2022). Discharge Recommendations: Savage Ceballos scored a 17/24 on the AM-PAC ADL Inpatient form. Current research shows that an AM-PAC score of 17 or less is typically not associated with a discharge to the patient's home setting. Based on the patient's AM-PAC score and their current ADL deficits, it is recommended that the patient have 5-7 sessions per week of Occupational Therapy at d/c to increase the patient's independence. At this time, this patient demonstrates complex nursing, medical, and rehabilitative needs, and would benefit from intensive rehabilitation services upon discharge from the Inpatient setting. This patient demonstrates the ability to participate in and benefit from an intensive therapy program with a coordinated interdisciplinary team approach to foster frequent, structured, and documented communication among disciplines, who will work together to establish, prioritize, and achieve treatment goals. Please see assessment section for further patient specific details. If patient discharges prior to next session this note will serve as a discharge summary.   Please see below for the latest assessment towards goals. DME Required For Discharge: DME to be determined at next level of care    Precautions/Restrictions: high fall risk  Weight Bearing Restrictions: weight bearing as tolerated  [] Right Upper Extremity  [] Left Upper Extremity [x] Right Lower Extremity  [] Left Lower Extremity     Required Braces/Orthotics: hinged knee brace locked in extension   [x] Right  [] Left  Positional Restrictions: No ROM of R knee, requires hinged knee brace OOB, allowed to take breaks while in bed or in recliner with legs extended per Ortho NP    Pre-Admission Information   Lives With: alone    Type of Home: house  Home Layout: quad level   Home Access:  1 step to enter without rails   Bathroom Layout: walk in shower  Bathroom Equipment: shower chair, Comment: suction grab bar  Toilet Height: standard height  Home Equipment: rolling walker, single point cane  Transfer Assistance: Independent without use of device  Ambulation Assistance:Independent without use of device  ADL Assistance: independent with all ADL's  IADL Assistance: independent with homemaking tasks  Active :        [x] Yes  [] No  Hand Dominance: [] Left  [] Right  Current Employment: unemployed  Hobbies: Reading  Recent Falls: 1 other fall besides one for admission      Subjective: Pt (brace on RLE) supine in bed upon entry, pleasant and anxious. Pt agreeable to therapy session, extended time for activity due to anxiety. Pt requires step by step vcs for all activity/transfers/mobility due to anxiety. General: RN removed dixon cath and pt with calos care supine in bed. Pt supine to sit CGA and pt sit EOB SBA. Pt required assist to thread brief. Pt sit to stand CGA and ambulated ~12ft with rw at CGA to bathroom toilet. Pt toilet transfer [address]. Pt with extended time to attempt to void, pt with bowel movement. Pt required assist for brief mgmt and rear hygiene care, pt performed calos hygiene seated on toilet.  Pt in stance at sink ~6min SBA to wash face/comb hair/oral care/wash hands. Pt ambulated ~12ft CGA to recliner with rw. Pt stand to sit CGA. Pt with warm wipes washed UB with setup and donned new gown. Call light in reach and chair alarm on. Pain: 3/10. Location: RLE  Pain Interventions: RN notified        Activities of Daily Living  Basic Activities of Daily Living  Grooming: stand by assistance  Upper Extremity Bathing: setup assistance  Lower Extremity Dressing: maximum assistance  Toileting: maximum assistance. Toileting Equipment: none  Instrumental Activities of Daily Living  No IADL completed on this date. Functional Mobility  Bed Mobility  Supine to Sit: contact guard assistance  Comments: HOB raised  Transfers  Sit to stand transfer:contact guard assistance  Stand to sit transfer: contact guard assistance  Toilet transfer: minimal assistance  Toilet transfer equipment: raised toilet seat with rails  Functional Mobility:  Sitting Balance: stand by assistance. Standing Balance: stand by assistance.     Functional Mobility: .  contact guard assistance  Functional Mobility Activity: to/from bathroom  Functional Mobility Device Use: rolling walker    Other Therapeutic Interventions    Functional Outcomes  AM-PAC Inpatient Daily Activity Raw Score: 17    Cognition  WFL  Orientation:    alert and oriented x 4  Command Following:   [facility]     Education  Barriers To Learning: none  Patient Education: patient educated on goals, OT role and benefits, plan of care, ADL adaptive strategies, IADL safety, weight-bearing education, proper use of assistive device/equipment, transfer training, discharge recommendations  Learning Assessment:  patient verbalizes and demonstrates understanding    Assessment  Activity Tolerance: Good, pt anxious and requires extended time and comfort  Impairments Requiring Therapeutic Intervention: decreased functional mobility, decreased ADL status, decreased ROM, decreased strength, decreased safety awareness, decreased endurance, decreased IADL, increased pain  Prognosis: good  Clinical Assessment: Pt present below baseline d/t above deficits. Currently, pt is Max A for LB dressing, Dependent for toileting and CGA/Min A for functional transfers. Prior to admission pt was IND in all ADLs/IADLs. Pt would benefit from continued OT services to facilitate return to PLOF. Safety Interventions: patient left in chair, chair alarm in place, call light within reach, patient at risk for falls, and nurse notified    Plan  Frequency: 7 x/week  Current Treatment Recommendations: strengthening, ROM, balance training, functional mobility training, transfer training, endurance training, patient/caregiver education, ADL/self-care training, IADL training, and safety education    Goals  Short Term Goals:  Time Frame: By discharge  Patient will complete lower body ADL at minimal assistance - continue goal   Patient will complete toileting at stand by assistance - continue goal  Patient will complete functional transfers at contact guard assistance - goal met 9/23  Patient will complete functional mobility at moderate assistance - goal met 9/23  Patient will increase functional standing balance to SBA for improved ADL completion- goal met 9/23    Therapy Session Time     Individual Group Co-treatment   Time In    1310   Time Out    1404   Minutes    54        Timed Code Treatment Minutes:  54 minutes  Total Treatment Minutes:  54 minutes     Electronically Signed By: NOLAN Baum/L [id number]   I have reviewed and agree with the above treatment note.  Ermalene Spotted OTR/L [id number]

## 2022-09-23 NOTE — PROGRESS NOTES
Physical Therapy    [facility]   Phone: [phone]    Physical Therapy    [] Initial Evaluation            [x] Daily Treatment Note         [] Discharge Summary      Patient: Rosalva Cook   : 1950   MRN: [id number]   Date of Service:  2022  Admitting Diagnosis: Closed fracture of distal end of right femur, unspecified fracture morphology, initial encounter [facility])  Current Admission Summary: Total knee replacement knee landed underneath her as she fell down two stairs. Knee visibly deformed  Pt reports not hitting her head. Pt reports very little pain with no movement, excruciating pain with movement. 10/10   OPEN REDUCTION INTERNAL FIXATION OF RIGHT DISTAL FEMUR  Past Medical History:  has a past medical history of Arthritis, Hyperlipidemia, and Hypertension. Past Surgical History:  has a past surgical history that includes Middle ear surgery; Varicose vein surgery; Total knee arthroplasty (Right, 2020); and Femur fracture surgery (Right, 2022). Discharge Recommendations: Rosalva Cook scored a 15/24 on the AM-PAC short mobility form. Current research shows that an AM-PAC score of 17 or less is typically not associated with a discharge to the patient's home setting. Based on the patient's AM-PAC score and their current functional mobility deficits, it is recommended that the patient have 5-7 sessions per week of Physical Therapy at d/c to increase the patient's independence. At this time, this patient demonstrates complex nursing, medical, and rehabilitative needs, and would benefit from intensive rehabilitation services upon discharge from the Inpatient setting.   This patient demonstrates the ability to participate in and benefit from an intensive therapy program with a coordinated interdisciplinary team approach to foster frequent, structured, and documented communication among disciplines, who will work together to establish, prioritize, and achieve treatment goals. Please see assessment section for further patient specific details. If patient discharges prior to next session this note will serve as a discharge summary. Please see below for the latest assessment towards goals.     DME Required For Discharge: DME to be determined at next level of care  Precautions/Restrictions: high fall risk  Weight Bearing Restrictions: weight bearing as tolerated  [] Right Upper Extremity        [] Left Upper Extremity         [x] Right Lower Extremity         [] Left Lower Extremity             Required Braces/Orthotics: hinged knee brace locked in extension                   [x] Right            [] Left  Positional Restrictions: No ROM of R knee, requires hinged knee brace OOB, allowed to take breaks from brace while in bed or in recliner with legs extended per Ortho NP    Pre-Admission Information   Lives With: alone                     Type of Home: house  Home Layout: quad level   Home Access:  1 step to enter without rails   Bathroom Layout: walk in shower  Bathroom Equipment: shower chair, Comment: suction grab bar  Toilet Height: standard height  Home Equipment: rolling walker, single point cane  Transfer Assistance: Independent without use of device  Ambulation Assistance:Independent without use of device  ADL Assistance: independent with all ADL's  IADL Assistance: independent with homemaking tasks  Active :        [x] Yes                 [] No  Hand Dominance: [] Left                 [] Right  Current Employment: unemployed  Hobbies: Reading  Recent Falls: 1 other fall besides one for admission     Examination   Vision:   Vision Gross Assessment: Impaired and Vision Corrective Device: wears glasses for reading  Hearing:   hard of hearing, left hearing aid, right hearing aid  Observation:   Hinged knee brace donned upon arrival   Posture:   WFL   Sensation:   WFL  ROM:   R LE ROM restricted   L LE ROM WFL   Strength:   R LE strength not formally tested  L LE strength 4/5   Decision Making: medium complexity  Clinical Presentation: evolving      Subjective  General: Pt supine in bed upon arrival. Requesting to bathing and change gown. Pt agreeable to PT/OT eval. Nursing in to remove catheter. Pain: 0/10 and 3/10. Location: R knee - pain only with movement. Pain Interventions: RN notified       Functional Mobility  Bed Mobility  Supine to Sit: contact guard assistance  Scooting: contact guard assistance  Comments: Assist with R LE slightly, Pt sat EOB with good sitting balance. Transfers  Sit to stand transfer: contact guard assistance - from EOB, minimal assistance - for toilet transfer  Stand to sit transfer: contact guard assistance  Toilet transfer: contact guard assistance  Comments: Cues for hand placement and stepping forward with R LE. Transfers from EOB and toilet. Ambulation  Surface:level surface  Assistive Device: rolling walker  Assistance: contact guard assistance  Distance: 12 ft x 2  Gait Mechanics: step-to gait pattern, slow daina. Comments:   Pt amb to bathroom to toilet, sat to have BM. Pt then stood at sink for ADLs 6 min before returning to sit in chair. Pt positioned for comfort and needs in reach. Stair Mobility  Stair mobility not completed on this date. Comments:  Wheelchair Mobility:  No w/c mobility completed on this date. Comments:  Balance  Static Sitting Balance: fair (+): maintains balance at SBA/supervision without use of UE support  Dynamic Sitting Balance: fair (+): maintains balance at SBA/supervision without use of UE support  Static Standing Balance: fair (-): maintains balance at CGA with use of UE support  Dynamic Standing Balance: fair (-): maintains balance at CGA with use of UE support  Comments: RW in standing, SBA standing balance at sink. Other Therapeutic Interventions  Toileting, assist for calos hygiene and brief change. Standing ADLs at sink.  Pt performed seated there ex including AP, QS, GS x 10 each. Pt positioned in recliner with LE elevated. Discussed DC recommendations. Pt anxious and asking a lot of questions. Additional time needed to complete tasks and answer questions. Functional Outcomes  AM-PAC Inpatient Mobility Raw Score : 15              Cognition  WFL  Orientation:    alert and oriented x 4  Command Following:   [facility]    Education  Barriers To Learning: emotional  Patient Education: patient educated on goals, PT role and benefits, plan of care, discharge recommendations  Learning Assessment:  patient verbalizes and demonstrates understanding    Assessment  Activity Tolerance: Pt very anxious throughout session. Requires frequent reassurance. Pt self limiting   Impairments Requiring Therapeutic Intervention: decreased functional mobility, decreased ROM, decreased strength, decreased safety awareness, decreased endurance, decreased balance, increased pain  Prognosis: good  Clinical Assessment: CGA bed mob, min A transfers, CGA gait with RW. Pt s/p ORIF of R distal femur after fall at home. At this time, pt requires min A to complete bed mobility and min A x2 to complete stand step transfers with RW. Pt anxious, requires constant encouragement and re-assurance. Pt would continue to benefit from skilled PT in order to safely return to PLOF.    Safety Interventions: patient left in chair, chair alarm in place, call light within reach, gait belt, patient at risk for falls, and nurse notified    Plan  Frequency: 7 x/week  Current Treatment Recommendations: strengthening, balance training, functional mobility training, transfer training, gait training, stair training, endurance training, and pain management    Goals  Patient Goals: none stated    Short Term Goals:  Time Frame: upon discharge   Patient will complete bed mobility at Independent   Patient will complete transfers at stand by assistance   Patient will ambulate 15 ft with use of LRAD at modified independent  Patient will ascend/descend 1 stairs without use of HR at contact guard assistance  **No goals met in full this date, 9/23.     Therapy Session Time      Individual Group Co-treatment   Time In     1310   Time Out     1404   Minutes     54     Treatment Minutes:  54 Minutes    Electronically Signed By: Kristine Ang, [id number]

## 2022-09-23 NOTE — PROGRESS NOTES
[facility]   Progress Note    CHIEF COMPLAINT/DIAGNOSIS: S/p RIGHT distal femur periprosthetic fx ORIF     SUBJECTIVE: The patient is sitting up in the bed; describes mild to moderate thigh/knee pain. Denies paresthesias. Therapy rec ARU. Denies new issues. Stil anxious. OBJECTIVE  Physical    VITALS:  BP (!) 173/71   Pulse 94   Temp 98 °F (36.7 °C) (Oral)   Resp 18   Ht 5' 8\" (1.727 m)   Wt 140 lb (63.5 kg)   LMP 02/14/1992   SpO2 98%   BMI 21.29 kg/m²     GENERAL: Alert and oriented x3, in no acute distress. MUSCULOSKELETAL: Intact DF/PF in operative leg. INCISION:  Covered with post-op dressing and ACE; clean, dry and intact. Swelling as expected; compartments remain easily compressible and reasonably supple. ROM: To operative knee deferred. Sensory:  Intact to light touch in tibial and peroneal distributions. Vascular:   2+ DP pulse with brisk cap refill. Data    ALL MEDICATIONS HAVE BEEN REVIEWED    CBC:   Recent Labs     09/21/22 0455 09/22/22  0523   WBC 4.7 8.4   HGB 8.1* 8.6*   HCT 24.4* 25.8*    225     BMP:   Recent Labs     09/21/22 0455 09/22/22  0523   * 129*   K 3.8 3.6    101   CO2 22 19*   BUN 8 8   CREATININE <0.5* <0.5*     INR:   Recent Labs     09/21/22 0455   INR 1.11     Post-op films show stable total knee arthroplasty construct with IM nail and lateral locking plate and screws in good position without acute complication. ASSESSMENT:  S/p ORIF RIGHT distal femur periprosthetic fx (9/21/22), POD#2  Anxiety  Acute post-op blood loss anemia as expected  Hyponatremia  HTN  HLD    PLAN:   - WB status:  OK for weight bearing as tolerated through operative leg; NO ROM x 2 weeks or until repeat x rays at earliest; Reviewed post op precautions.  - DVT prophylaxis: Lovenox 40mg daily x 4 wks  - PT/OT  - Pain Control: Current regimen.  Scheduled tylenol and toradol with oxy prn (rx in chart) Due to orthopaedic surgical procedure/condition, patient may require pain medication for up to 6-8 weeks. - Expected post op acute blood loss anemia: H/H today: 8.6/25.8 (s/p 1 unit PRBC on 9/21)  - Dispo: therapy rec ARU; ok to d/c from our end once dispo needs met/medically stable. Follow-up with Dr. Madilyn Brittle in 2 weeks. Office # [phone]  No future appointments.     ERIN Ferraro - CNP  9/23/2022  11:23 AM

## 2022-09-23 NOTE — PROGRESS NOTES
Peralta catheter discontinued peer order . Patient tolerated well. Instructed on need to check urine output with void . Patient verbalizes understanding.  Hat in toilet

## 2022-09-23 NOTE — PROGRESS NOTES
Hospitalist Progress Note      PCP: Adrián Sanchez MD    Date of Admission: 9/19/2022    Chief Complaint:   Right knee pain    Hospital Course: Lázaro Gordon is a 71 y.o. female who presents to ED for evaluation of right knee pain s/p mechanical fall. Patient reports that she slipped on the second to last step while going down the stairs and landed with her right leg underneath and behind her. She reports she has been unable to stand or bend her knee. She reports severe pain with movement but minimal pain with rest.  She denies any other injury. She denies hitting her head. She denies dizziness or other symptoms prior to fall and reports fall was entirely mechanical in nature. She denies fever, chest pain, shortness of breath, cough, abdominal pain, nausea, vomiting, diarrhea, constipation, urinary symptoms. X-ray in ED notable for displaced comminuted fracture of the distal femur. Fracture was reduced in ED and splint was placed. Ortho consulted and requested CT.     Subjective:   Still has right knee pain   Has anxiety- needed daizpam last night      Medications:  Reviewed    Infusion Medications    sodium chloride      sodium chloride      sodium chloride      sodium chloride 75 mL/hr at 09/22/22 2132     Scheduled Medications    atorvastatin  10 mg Oral Daily    vitamin B-12  500 mcg Oral Daily    sodium chloride flush  10 mL IntraVENous 2 times per day    enoxaparin  40 mg SubCUTAneous Nightly    acetaminophen  1,000 mg Oral TID    polyethylene glycol  17 g Oral Daily     PRN Meds: sodium chloride, sodium chloride, sodium chloride flush, sodium chloride, magnesium hydroxide, acetaminophen **OR** acetaminophen, labetalol, oxyCODONE **OR** oxyCODONE, tiZANidine, diazePAM, ondansetron **OR** ondansetron, HYDROmorphone **OR** HYDROmorphone      Intake/Output Summary (Last 24 hours) at 9/23/2022 0901  Last data filed at 9/23/2022 0925  Gross per 24 hour   Intake 0 ml   Output 4175 ml   Net -4175 ml         Physical Exam Performed:    BP (!) 158/70   Pulse 92   Temp 98.6 °F (37 °C) (Oral)   Resp 18   Ht 5' 8\" (1.727 m)   Wt 140 lb (63.5 kg)   LMP 02/14/1992   SpO2 98%   BMI 21.29 kg/m²     General appearance:  Awake, alert, no apparent distress  HEENT:  Normocephalic, atraumatic without obvious deformity. PERRL. EOM intact. Conjunctivae/corneas clear. Neck: Supple, with full range of motion. No JVD. Trachea midline. Respiratory:  Clear to auscultation bilaterally without rales, wheezes, or rhonchi. Normal respiratory effort. Cardiovascular:  Regular rate and rhythm without murmurs, rubs or gallops. Abdomen: Soft, NT, ND, without rebound or guarding. Normal bowel sounds. Extremities:  Right knee immobilizer in place. No clubbing, cyanosis, or edema bilaterally. +2 palpable pulses, equal bilaterally. Capillary refill brisk,< 3 seconds   Skin: No rashes or lesions. Warm/dry. Neurologic:  Neurovascularly intact without any focal sensory/motor deficits. Cranial nerves: II-XII intact, grossly non-focal. Alert and oriented x 3. Normal speech. Psychiatric:  Thought content appropriate, normal insight. Labs:   Recent Labs     09/21/22 0455 09/22/22  0523   WBC 4.7 8.4   HGB 8.1* 8.6*   HCT 24.4* 25.8*    225       Recent Labs     09/21/22 0455 09/22/22  0523   * 129*   K 3.8 3.6    101   CO2 22 19*   BUN 8 8   CREATININE <0.5* <0.5*   CALCIUM 8.1* 8.0*       No results for input(s): AST, ALT, BILIDIR, BILITOT, ALKPHOS in the last 72 hours. Recent Labs     09/21/22  0455   INR 1.11       No results for input(s): Ardelle Chalk in the last 72 hours.     Urinalysis:      Lab Results   Component Value Date/Time    NITRU Negative 01/17/2020 01:53 PM    BLOODU Negative 01/17/2020 01:53 PM    SPECGRAV 1.010 01/17/2020 01:53 PM    GLUCOSEU Negative 01/17/2020 01:53 PM       Radiology:  XR FEMUR RIGHT (MIN 2 VIEWS)   Final Result   Postoperative changes of the right femur.         XR FEMUR RIGHT (MIN 2 VIEWS)   Final Result      CT FEMUR RIGHT WO CONTRAST   Final Result   1. Comminuted periprosthetic distal femur fracture, with extension through   the lateral femoral condyle. Associated lipohemarthrosis with additional   hemorrhage and edema in the vastus intermedius muscle. XR KNEE RIGHT (1-2 VIEWS)   Final Result   Mild improved angulation of the comminuted displaced fracture of the distal   femoral metadiaphysis. XR HIP 2-3 VW W PELVIS RIGHT   Final Result      Osteoarthritic change of the right hip. No evidence of fracture or   malalignment. XR KNEE RIGHT (1-2 VIEWS)   Final Result      Comminuted fracture of the distal femoral shaft above a uninvolved total knee   arthroplasty. The fracture is displaced and angulated anteriorly. ASSESSMENT/PLAN:     Closed fracture of right distal femur on x-ray. comminuted fracture of the distal femur  Reduced in ED. Splint in place  Pain control  Had Right femur ORIF with lateral locking plate and IMN 4/02       Hyponatremia  Na 126-->133-->129  DC HCTZ   IDC iv fluids  Fluid restrict to 1500ml/d  Monitor Na qd  Consult nephrology     HTN  Restart 10mg po qd Lisinopril  IV Labetalol PRN     HLD  Continue home lovastatin (sub w/ atorvastatin)    Anxiety  PRN Diazepam  Consult to psych to advice        DVT prophylaxis: Lovenox  Probiotic if on abx: N/A     Diet: ADULT DIET;  Regular  Diet NPO Exceptions are: Sips of Water with Meds  Code Status: Full Code     Consults:  IP CONSULT TO ORTHOPEDIC SURGERY     Disposition: Admit to Inpatient     ELOS: Greater than two midnights due to medical therapy     Adebayo Webb MD

## 2022-09-23 NOTE — PLAN OF CARE
Problem: Discharge Planning  Goal: Discharge to home or other facility with appropriate resources  Outcome: Progressing     Problem: Pain  Goal: Verbalizes/displays adequate comfort level or baseline comfort level  Outcome: Progressing     Problem: Safety - Adult  Goal: Free from fall injury  Outcome: Progressing     Problem: Skin/Tissue Integrity - Adult  Goal: Skin integrity remains intact  Outcome: Progressing  Goal: Incisions, wounds, or drain sites healing without S/S of infection  Outcome: Progressing     Problem: Musculoskeletal - Adult  Goal: Return mobility to safest level of function  Outcome: Progressing  Goal: Maintain proper alignment of affected body part  Outcome: Progressing  Goal: Return ADL status to a safe level of function  Outcome: Progressing     Problem: Genitourinary - Adult  Goal: Absence of urinary retention  Outcome: Progressing  Goal: Urinary catheter remains patent  Outcome: Progressing     Problem: Skin/Tissue Integrity  Goal: Absence of new skin breakdown  Description: 1. Monitor for areas of redness and/or skin breakdown  2. Assess vascular access sites hourly  3. Every 4-6 hours minimum:  Change oxygen saturation probe site  4. Every 4-6 hours:  If on nasal continuous positive airway pressure, respiratory therapy assess nares and determine need for appliance change or resting period.   Outcome: Progressing

## 2022-09-24 LAB — SODIUM BLD-SCNC: 131 MMOL/L (ref 136–145)

## 2022-09-24 PROCEDURE — 97116 GAIT TRAINING THERAPY: CPT

## 2022-09-24 PROCEDURE — 36415 COLL VENOUS BLD VENIPUNCTURE: CPT

## 2022-09-24 PROCEDURE — 6360000002 HC RX W HCPCS: Performed by: PHYSICIAN ASSISTANT

## 2022-09-24 PROCEDURE — 6360000002 HC RX W HCPCS: Performed by: NURSE PRACTITIONER

## 2022-09-24 PROCEDURE — 84295 ASSAY OF SERUM SODIUM: CPT

## 2022-09-24 PROCEDURE — 1200000000 HC SEMI PRIVATE

## 2022-09-24 PROCEDURE — 6370000000 HC RX 637 (ALT 250 FOR IP): Performed by: INTERNAL MEDICINE

## 2022-09-24 PROCEDURE — 6370000000 HC RX 637 (ALT 250 FOR IP): Performed by: NURSE PRACTITIONER

## 2022-09-24 PROCEDURE — 97110 THERAPEUTIC EXERCISES: CPT

## 2022-09-24 PROCEDURE — 2580000003 HC RX 258: Performed by: PHYSICIAN ASSISTANT

## 2022-09-24 PROCEDURE — 83935 ASSAY OF URINE OSMOLALITY: CPT

## 2022-09-24 PROCEDURE — 84300 ASSAY OF URINE SODIUM: CPT

## 2022-09-24 PROCEDURE — 97530 THERAPEUTIC ACTIVITIES: CPT

## 2022-09-24 PROCEDURE — 6370000000 HC RX 637 (ALT 250 FOR IP): Performed by: PHYSICIAN ASSISTANT

## 2022-09-24 RX ORDER — LISINOPRIL 20 MG/1
20 TABLET ORAL DAILY
Status: DISCONTINUED | OUTPATIENT
Start: 2022-09-25 | End: 2022-09-26 | Stop reason: HOSPADM

## 2022-09-24 RX ADMIN — ACETAMINOPHEN 1000 MG: 500 TABLET ORAL at 09:35

## 2022-09-24 RX ADMIN — CYANOCOBALAMIN TAB 1000 MCG 500 MCG: 1000 TAB at 09:35

## 2022-09-24 RX ADMIN — OXYCODONE 10 MG: 5 TABLET ORAL at 21:59

## 2022-09-24 RX ADMIN — KETOROLAC TROMETHAMINE 30 MG: 30 INJECTION, SOLUTION INTRAMUSCULAR at 22:00

## 2022-09-24 RX ADMIN — Medication 10 ML: at 22:02

## 2022-09-24 RX ADMIN — OXYCODONE 5 MG: 5 TABLET ORAL at 11:16

## 2022-09-24 RX ADMIN — ENOXAPARIN SODIUM 40 MG: 100 INJECTION SUBCUTANEOUS at 21:59

## 2022-09-24 RX ADMIN — Medication 10 ML: at 08:12

## 2022-09-24 RX ADMIN — DIAZEPAM 2 MG: 2 TABLET ORAL at 09:35

## 2022-09-24 RX ADMIN — ACETAMINOPHEN 1000 MG: 500 TABLET ORAL at 14:51

## 2022-09-24 RX ADMIN — DIAZEPAM 2 MG: 2 TABLET ORAL at 22:00

## 2022-09-24 RX ADMIN — ATORVASTATIN CALCIUM 10 MG: 10 TABLET, FILM COATED ORAL at 09:35

## 2022-09-24 RX ADMIN — ACETAMINOPHEN 1000 MG: 500 TABLET ORAL at 22:00

## 2022-09-24 RX ADMIN — LISINOPRIL 10 MG: 10 TABLET ORAL at 09:35

## 2022-09-24 RX ADMIN — OXYCODONE 5 MG: 5 TABLET ORAL at 17:49

## 2022-09-24 ASSESSMENT — PAIN DESCRIPTION - ORIENTATION
ORIENTATION: RIGHT

## 2022-09-24 ASSESSMENT — PAIN DESCRIPTION - DESCRIPTORS
DESCRIPTORS: ACHING
DESCRIPTORS: ACHING;CRAMPING;DISCOMFORT

## 2022-09-24 ASSESSMENT — PAIN DESCRIPTION - LOCATION
LOCATION: LEG

## 2022-09-24 ASSESSMENT — PAIN SCALES - GENERAL
PAINLEVEL_OUTOF10: 6
PAINLEVEL_OUTOF10: 5
PAINLEVEL_OUTOF10: 6
PAINLEVEL_OUTOF10: 5
PAINLEVEL_OUTOF10: 6
PAINLEVEL_OUTOF10: 5

## 2022-09-24 NOTE — PLAN OF CARE
Absence of new skin breakdown  Description: 1. Monitor for areas of redness and/or skin breakdown  2. Assess vascular access sites hourly  3. Every 4-6 hours minimum:  Change oxygen saturation probe site  4. Every 4-6 hours:  If on nasal continuous positive airway pressure, respiratory therapy assess nares and determine need for appliance change or resting period.   9/24/2022 1135 by Sade Lora RN  Outcome: Progressing  9/23/2022 2158 by Khang Chan RN  Outcome: Progressing

## 2022-09-24 NOTE — PROGRESS NOTES
Physical Therapy    [facility] [address]   Phone: [phone]    Physical Therapy    [] Initial Evaluation            [x] Daily Treatment Note         [] Discharge Summary      Patient: Shaista Grossman   : 1950   MRN: [id number]   Date of Service:  2022  Admitting Diagnosis: Closed fracture of distal end of right femur, unspecified fracture morphology, initial encounter [facility])  Current Admission Summary: 72 yo female admitted to [facility] on . Presented with leg pain following a fall. Periprosthetic fracture of the lower extremity diagnosed. Underwent open reduction internal fixation on  by Dr. Kapil Barger. Past Medical History:  has a past medical history of Arthritis, Hyperlipidemia, and Hypertension. Past Surgical History:  has a past surgical history that includes Middle ear surgery; Varicose vein surgery; Total knee arthroplasty (Right, 2020); and Femur fracture surgery (Right, 2022). Discharge Recommendations: Shaista Grossman scored a 16/24 on the AM-PAC short mobility form. Current research shows that an AM-PAC score of 17 or less is typically not associated with a discharge to the patient's home setting. Based on the patient's AM-PAC score and their current functional mobility deficits, it is recommended that the patient have 5-7 sessions per week of Physical Therapy at d/c to increase the patient's independence. At this time, this patient demonstrates complex nursing, medical, and rehabilitative needs, and would benefit from intensive rehabilitation services upon discharge from the Inpatient setting. This patient demonstrates the ability to participate in and benefit from an intensive therapy program with a coordinated interdisciplinary team approach to foster frequent, structured, and documented communication among disciplines, who will work together to establish, prioritize, and achieve treatment goals.  Please see assessment section for further patient specific details. If patient discharges prior to next session this note will serve as a discharge summary. Please see below for the latest assessment towards goals. DME Required For Discharge: DME to be determined at next level of care    Precautions/Restrictions: high fall risk  Weight Bearing Restrictions: weight bearing as tolerated  [] Right Upper Extremity        [] Left Upper Extremity         [x] Right Lower Extremity         [] Left Lower Extremity             Required Braces/Orthotics: hinged knee brace locked in extension                   [x] Right            [] Left  Positional Restrictions: No ROM of R knee, requires hinged knee brace OOB, allowed to take breaks from brace while in bed or in recliner with legs extended per Ortho NP    Pre-Admission Information   Lives With: alone                     Type of Home: house  Home Layout: quad level   Home Access:  1 step to enter without rails   Bathroom Layout: walk in shower  Bathroom Equipment: shower chair, Comment: suction grab bar  Toilet Height: standard height  Home Equipment: rolling walker, single point cane  Transfer Assistance: Independent without use of device  Ambulation Assistance:Independent without use of device  ADL Assistance: independent with all ADL's  IADL Assistance: independent with homemaking tasks  Active :        [x] Yes                 [] No  Hand Dominance: [] Left                 [] Right  Current Employment: unemployed  Hobbies: Reading  Recent Falls: 1 other fall besides one for admission      Subjective  General: Pt reclined in chair upon therapist arrival. Agreeable to PT tx and assist to bathroom. Pain: 6/10. Location: (R) knee with activity  Pain Interventions: RN notified and in during session to administer medications       Functional Mobility  Bed Mobility  Bed mobility not completed on this date. Comments: Pt up in chair at beginning/end of session.   Transfers  Sit to stand transfer: contact guard assistance   Stand to sit transfer: contact guard assistance  Toilet transfer: contact guard assistance  Comments: Cues for hand placement and keeping R LE extended in front for sit<>stand to compensate for KI brace. Ambulation  Surface:level surface  Assistive Device: rolling walker  Assistance: contact guard assistance  Distance: 12 + 30 ft  Gait Mechanics: step-to gait pattern, slow daina. Comments:   Pt amb to bathroom to toilet, sat to void urine. Then ambulated 1 lap in room before returning to her recliner. Pt moves very slowly and requires significant increased time to ambulate in room. Stair Mobility  Stair mobility not completed on this date. Comments:  Wheelchair Mobility:  No w/c mobility completed on this date. Comments:  Balance  Static Sitting Balance: good: independent with functional balance in unsupported position  Dynamic Sitting Balance: good: independent with functional balance in unsupported position  Static Standing Balance: fair (-): maintains balance at CGA with use of UE support  Dynamic Standing Balance: fair (-): maintains balance at CGA with use of UE support  Comments: Indep-Mod I for sitting balance with pericare on toilet. CGA for standing balance at sink without UE support to wash hands x30 sec. RW used for all other standing tasks. Other Therapeutic Interventions  Assisted up to bathroom, voided bladder, RN aware. Seated exercises: hip IR/ER with knee extended x10 (B) (minimal ROM on (R) 2/2 pain), glut sets 5 sec x10 reps (B), ankle pumps x10 reps (B), hip abduction/adduction x10 reps (B) with AAROM on (R)     Functional Outcomes  AM-PAC Inpatient Mobility Raw Score : 16              Cognition  WFL  Comment: Pt is a bit anxious but appears much calmer today per staff.   Orientation:    alert and oriented x 4  Command Following:   [facility] [address] [facility]    Education  Barriers To Learning: emotional  Patient Education: patient educated on goals, PT role and benefits, plan of care, weight-bearing education, HEP, general safety, functional mobility training, transfer training, discharge recommendations  Learning Assessment:  patient verbalizes understanding, would benefit from continued reinforcement    Assessment  Activity Tolerance: Pt with increased pain with (R) LE movement but she tolerated the session well. She was fatigued following ambulation and had less tolerance to seated exercises with (R) LE vs (L). Impairments Requiring Therapeutic Intervention: decreased functional mobility, decreased ROM, decreased strength, decreased safety awareness, decreased endurance, decreased balance, increased pain  Prognosis: good  Clinical Assessment: Pt presents with less anxiety today and was able to push herself a but further despite increased (R) LE pain with weight bearing and movement. The patient demonstrates good balance with use of RW for ADL tasks in the bathroom. CGA provided throughout session. The patient does continue to need cues for (R) LE positioning with transfers. Pt would continue to benefit from skilled PT in order to safely return to PLOF. Safety Interventions: patient left in chair, chair alarm in place, call light within reach, gait belt, patient at risk for falls, and nurse notified    Plan  Frequency: 7 x/week  Current Treatment Recommendations: strengthening, balance training, functional mobility training, transfer training, gait training, stair training, endurance training, and pain management    Goals  Patient Goals: none stated    Short Term Goals:  Time Frame: upon discharge   Patient will complete bed mobility at Independent   Patient will complete transfers at stand by assistance   Patient will ambulate 15 ft with use of LRAD at modified independent  Patient will ascend/descend 1 stairs without use of HR at contact guard assistance  **No goals met in full this date, 9/24.     Therapy Session Time      Individual Group Co-treatment   Time In 1430       Time Out 1513       Minutes 43         Treatment Minutes:  43 Minutes    Electronically Signed By: Liset Sanderson, PT      Ovidio Mckeno PT, DPT #[id number]

## 2022-09-24 NOTE — PROGRESS NOTES
Shift assessment complete, VSS, A&O x4, meds given per MAR. Neuro checks complete. Call light is within reach and fall precautions in place. The care plan and education has been reviewed and mutually agreed upon with the patient.

## 2022-09-24 NOTE — PROGRESS NOTES
Shift assessment complete. Meds given per eMAR. Pt still anxious. Valium given and ice packs given for comfort. Call light within reach. The care plan and education has been reviewed and mutually agreed upon with the patient.    Electronically signed by Jaymie Hamm RN on 9/24/2022 at 4:30 AM

## 2022-09-24 NOTE — PROGRESS NOTES
Hospitalist Progress Note      PCP: Constantino Lay MD    Date of Admission: 9/19/2022    Chief Complaint: Leg pain    Hospital Course: Presented with leg pain following a fall. Periprosthetic fracture of the lower extremity diagnosed. Underwent open reduction internal fixation. Feels fine. Noted anxiety. Seen by psychiatry. Feels better. ARU recommended but not approved by insurance. Skilled nursing facility being planned. Subjective: No chest pain, no shortness of breath, no nausea, no vomiting. Leg pain controlled. Medications:  Reviewed    Infusion Medications    sodium chloride      sodium chloride      sodium chloride      sodium chloride 75 mL/hr at 09/23/22 0959     Scheduled Medications    lisinopril  10 mg Oral Daily    atorvastatin  10 mg Oral Daily    vitamin B-12  500 mcg Oral Daily    sodium chloride flush  10 mL IntraVENous 2 times per day    enoxaparin  40 mg SubCUTAneous Nightly    acetaminophen  1,000 mg Oral TID    polyethylene glycol  17 g Oral Daily     PRN Meds: ketorolac, sodium chloride, sodium chloride, sodium chloride flush, sodium chloride, magnesium hydroxide, acetaminophen **OR** acetaminophen, labetalol, oxyCODONE **OR** oxyCODONE, tiZANidine, diazePAM, ondansetron **OR** ondansetron      Intake/Output Summary (Last 24 hours) at 9/24/2022 1032  Last data filed at 9/23/2022 2044  Gross per 24 hour   Intake --   Output 300 ml   Net -300 ml       Physical Exam Performed:    /64   Pulse 90   Temp 98.3 °F (36.8 °C) (Oral)   Resp 17   Ht 5' 8\" (1.727 m)   Wt 140 lb (63.5 kg)   LMP 02/14/1992   SpO2 98%   BMI 21.29 kg/m²     General appearance: No apparent distress, appears stated age and cooperative. HEENT: Pupils equal, round, and reactive to light. Conjunctivae/corneas clear. Neck: Supple, with full range of motion. No jugular venous distention. Trachea midline. Respiratory:  Normal respiratory effort.  Clear to auscultation, bilaterally without Rales/Wheezes/Rhonchi. Cardiovascular: Regular rate and rhythm with normal S1/S2 without murmurs, rubs or gallops. Abdomen: Soft, non-tender, non-distended with normal bowel sounds. Musculoskeletal: No clubbing, cyanosis or edema bilaterally. Full range of motion without deformity except postoperative lower extremity which appears clean and stable. Skin: Skin color, texture, turgor normal.  No rashes or lesions. Neurologic:  Neurovascularly intact without any focal sensory/motor deficits. Cranial nerves: II-XII intact, grossly non-focal.  Psychiatric: Alert and oriented, thought content appropriate, normal insight  Capillary Refill: Brisk, 3 seconds, normal   Peripheral Pulses: +2 palpable, equal bilaterally       Labs:   Recent Labs     09/22/22  0523   WBC 8.4   HGB 8.6*   HCT 25.8*        Recent Labs     09/22/22  0523   *   K 3.6      CO2 19*   BUN 8   CREATININE <0.5*   CALCIUM 8.0*     No results for input(s): AST, ALT, BILIDIR, BILITOT, ALKPHOS in the last 72 hours. No results for input(s): INR in the last 72 hours. No results for input(s): Prentis Revels in the last 72 hours. Urinalysis:      Lab Results   Component Value Date/Time    NITRU Negative 01/17/2020 01:53 PM    BLOODU Negative 01/17/2020 01:53 PM    SPECGRAV 1.010 01/17/2020 01:53 PM    GLUCOSEU Negative 01/17/2020 01:53 PM       Radiology:  XR FEMUR RIGHT (MIN 2 VIEWS)   Final Result   Postoperative changes of the right femur. XR FEMUR RIGHT (MIN 2 VIEWS)   Final Result      CT FEMUR RIGHT WO CONTRAST   Final Result   1. Comminuted periprosthetic distal femur fracture, with extension through   the lateral femoral condyle. Associated lipohemarthrosis with additional   hemorrhage and edema in the vastus intermedius muscle. XR KNEE RIGHT (1-2 VIEWS)   Final Result   Mild improved angulation of the comminuted displaced fracture of the distal   femoral metadiaphysis.          XR HIP 2-3 VW W PELVIS RIGHT   Final Result      Osteoarthritic change of the right hip. No evidence of fracture or   malalignment. XR KNEE RIGHT (1-2 VIEWS)   Final Result      Comminuted fracture of the distal femoral shaft above a uninvolved total knee   arthroplasty. The fracture is displaced and angulated anteriorly. Assessment/Plan:    Active Hospital Problems    Diagnosis     Anxiety disorder [F41.9]      Priority: Medium    Closed fracture of distal end of right femur, unspecified fracture morphology, initial encounter ([facility] [address] [[id number]]      Priority: Medium     PLAN    Right distal femoral periprosthetic fracture  Underwent surgical repair. Doing fine except for anxiety. Orthopedic surgery following    Hyponatremia  No recent labs. Repeat basic metabolic panel in the morning. Hydrochlorothiazide discontinued    Hypertension  Stopped hydrochlorothiazide  Continue lisinopril  Blood pressure is controlled today. Anxiety  Evaluated by psychiatry, diagnosed with unspecified anxiety disorder. Continue as needed benzodiazepines until clinical condition improved    Discussed with the patient. Questions answered    DVT Prophylaxis: Lovenox  Diet: ADULT DIET; Regular  Code Status: Full Code  PT/OT Eval Status:  In process    Dispo -skilled nursing facility pending pre-CERT    Leonor Espinoza MD

## 2022-09-24 NOTE — CONSULTS
[facility]   Nephrology progress Note  [phone]   [facility] [employer]. [place]           Reason for Consult:  hyponatremia     HISTORY OF PRESENT ILLNESS:      The patient is a 71 y.o. female with significant past medical history of hypertension, hyperlipidemia who presents with fall (9/19/2022) She underwent open reduction internal fixation (9/21/2022). She is noted to be hyponatremic post procedure and therefore nephrology is consulted. She came in with Na 126 >> which improved to 133 and post op now decreased to 129 this AM. She has been on IVF since 9/20 and sodium has downtrended since then. She has been on lisinopril/HCTZ prior to hospitalization and also reports drinking >125 oz of water per day. Says she likes it. Per chart review, she appears to have a history of chronic hyponatremia as well likely from hctz use and high free water intake. Today upon my evaluation, she is tolerating diet, continues to drink a lot of water per preference. She still has pain at op site that is not completely controlled. She denies nausea, headaches, dizziness, chest pain or shortness of breath. Past Medical History:        Diagnosis Date    Arthritis     knee    Hyperlipidemia     Hypertension        Past Surgical History:        Procedure Laterality Date    FEMUR FRACTURE SURGERY Right 9/21/2022    OPEN REDUCTION INTERNAL FIXATION OF RIGHT DISTAL FEMUR performed by Jamel Edmondson MD at [address] 2 times. TOTAL KNEE ARTHROPLASTY Right 1/28/2020    RIGHT ROBOTIC ASSISTED TOTAL KNEE ARTHROPLASTY - [employer] performed by Norma Ramirez MD at [address] [facility]         Current Medications:    No current facility-administered medications on file prior to encounter.      Current Outpatient Medications on File Prior to Encounter   Medication Sig Dispense Refill    lisinopril-hydroCHLOROthiazide (PRINZIDE;ZESTORETIC) 10-12.5 MG per tablet Take 1 tablet by mouth once daily 90 tablet 3    lovastatin (MEVACOR) 20 MG tablet Take 1 tablet by mouth nightly 90 tablet 3    ibuprofen (ADVIL;MOTRIN) 600 MG tablet Take 600 mg by mouth daily as needed for Pain      vitamin B-12 (CYANOCOBALAMIN) 500 MCG tablet Take 500 mcg by mouth daily      Calcium Carbonate-Vitamin D (CALCIUM + D PO) Take 1 tablet by mouth daily       Glucosamine-Chondroit-Vit C-Mn (GLUCOSAMINE CHONDR 1500 COMPLX) CAPS Take 1 tablet by mouth daily       Multiple Vitamins-Minerals (ONE-A-DAY WOMENS 50 PLUS PO) Take 1 tablet by mouth daily. Loratadine (CLARITIN PO) Take 1 tablet by mouth daily. Cranberry 1000 MG CAPS Take 1 tablet by mouth daily       niacin 250 MG tablet Take 250 mg by mouth daily (with breakfast). Polyethylene Glycol 3350 (MIRALAX PO) Take by mouth daily          Allergies:  No known allergies    Social History:    Social History     Socioeconomic History    Marital status:      Spouse name: Not on file    Number of children: Not on file    Years of education: Not on file    Highest education level: Not on file   Occupational History    Not on file   Tobacco Use    Smoking status: Never    Smokeless tobacco: Never   Vaping Use    Vaping Use: Never used   Substance and Sexual Activity    Alcohol use:  Yes     Alcohol/week: 14.0 standard drinks     Types: 14 Glasses of wine per week     Comment: Couple of drinks nightly     Drug use: Never    Sexual activity: Not Currently     Partners: Male     Comment:    Other Topics Concern    Not on file   Social History Narrative    Not on file     Social Determinants of Health     Financial Resource Strain: Low Risk     Difficulty of Paying Living Expenses: Not hard at all   Food Insecurity: No Food Insecurity    Worried About Running Out of Food in the Last Year: Never true    Ran Out of Food in the Last Year: Never true   Transportation Needs: Not on file   Physical Activity: Unknown    Days of Exercise per Week: 2 days    Minutes of Exercise per Session: Not on file   Stress: Not on file   Social Connections: Not on file   Intimate Partner Violence: Not on file   Housing Stability: Not on file       Family History:       Problem Relation Age of Onset    Mental Illness Mother     Depression Mother     High Blood Pressure Father     Cancer Paternal Aunt     Breast Cancer Paternal Aunt        REVIEW OF SYSTEMS:    Pertinent items are noted in HPI. 10 pt ROS done, relevant features as in [place], rest negative       PHYSICAL EXAM:    Vitals:    /64   Pulse 90   Temp 98.3 °F (36.8 °C) (Oral)   Resp 17   Ht 5' 8\" (1.727 m)   Wt 140 lb (63.5 kg)   LMP 02/14/1992   SpO2 98%   BMI 21.29 kg/m²   I/O last 3 completed shifts: In: 0   Out: 7355 [Urine:3025]  No intake/output data recorded. Physical Exam:  Gen:  alert, oriented x 3  PERRL , EOM +  Pallor +, No icterus  JVP not raised   CV: RRR no murmur or rub . Lungs:B/ L air entry, Normal breath sounds   Abd: soft, bowel sounds + , No organomegaly   Ext: No edema, no cyanosis  Skin: Warm.   No rash    Neuro: nonfocal.      DATA:    CBC:   Lab Results   Component Value Date/Time    WBC 8.4 09/22/2022 05:23 AM    RBC 2.79 09/22/2022 05:23 AM    HGB 8.6 09/22/2022 05:23 AM    HCT 25.8 09/22/2022 05:23 AM    MCV 92.4 09/22/2022 05:23 AM    MCH 30.9 09/22/2022 05:23 AM    MCHC 33.5 09/22/2022 05:23 AM    RDW 13.7 09/22/2022 05:23 AM     09/22/2022 05:23 AM    MPV 9.4 09/22/2022 05:23 AM     BMP:    Lab Results   Component Value Date/Time     09/22/2022 05:23 AM    K 3.6 09/22/2022 05:23 AM     09/22/2022 05:23 AM    CO2 19 09/22/2022 05:23 AM    BUN 8 09/22/2022 05:23 AM    LABALBU 4.2 09/19/2022 09:01 PM    CREATININE <0.5 09/22/2022 05:23 AM    CALCIUM 8.0 09/22/2022 05:23 AM    GFRAA >60 09/22/2022 05:23 AM    GFRAA >60 02/19/2013 06:21 AM    LABGLOM >60 09/22/2022 05:23 AM    GLUCOSE 134 09/22/2022 05:23 AM     Magnesium:  No results found for: MG  Phosphorus:    Lab Results   Component Value Date/Time    PHOS 4.2 08/24/2022 08:29 AM     U/A:    Lab Results   Component Value Date/Time    COLORU YELLOW 01/17/2020 01:53 PM    PROTEINU Negative 01/17/2020 01:53 PM    PHUR 8.0 01/17/2020 01:53 PM    CLARITYU Clear 01/17/2020 01:53 PM    SPECGRAV 1.010 01/17/2020 01:53 PM    LEUKOCYTESUR Negative 01/17/2020 01:53 PM    UROBILINOGEN 0.2 01/17/2020 01:53 PM    BILIRUBINUR Negative 01/17/2020 01:53 PM    BLOODU Negative 01/17/2020 01:53 PM    GLUCOSEU Negative 01/17/2020 01:53 PM           IMPRESSION/RECOMMENDATIONS:      Euvolemic hypotonic hyponatremia - I suspect she has mild chronic hyponatremia from high free water intake and HCTZ use (with baseline Na 134-136), however over the last few days, since admission and continued decline of sodium is 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 Uncontrolled pain is contributing to SIADH as well. Therefore would recommend the following:  -- fluid restriction to 2L per day  -- stop IVF. Hold HCTZ  -- check sodium every 8 hours. Check urine lytes and osm  -- high protein diet with protein shakes with each meal (these do not count toward fluid restriction)  -- no indication for 3% saline as patient is not symptomatic at this time. Hypertension - uncontrolled. Resume lisinopril, increase dose    Fall S/p ORIF RIGHT distal femur periprosthetic fx (9/21/22) - ortho following. Thank you for allowing me to participate in the care of this patient. I will continue to follow along. Please call with questions.     Tayler Le DO, MD  9/24/2022  [facility]

## 2022-09-25 LAB
A/G RATIO: 1.1 (ref 1.1–2.2)
ABO/RH: NORMAL
ALBUMIN SERPL-MCNC: 2.7 G/DL (ref 3.4–5)
ALP BLD-CCNC: 41 U/L (ref 40–129)
ALT SERPL-CCNC: 15 U/L (ref 10–40)
ANION GAP SERPL CALCULATED.3IONS-SCNC: 7 MMOL/L (ref 3–16)
ANTIBODY SCREEN: NORMAL
AST SERPL-CCNC: 19 U/L (ref 15–37)
BASOPHILS ABSOLUTE: 0 K/UL (ref 0–0.2)
BASOPHILS RELATIVE PERCENT: 0.6 %
BILIRUB SERPL-MCNC: 0.9 MG/DL (ref 0–1)
BLOOD BANK DISPENSE STATUS: NORMAL
BLOOD BANK PRODUCT CODE: NORMAL
BPU ID: NORMAL
BUN BLDV-MCNC: 14 MG/DL (ref 7–20)
CALCIUM SERPL-MCNC: 8.4 MG/DL (ref 8.3–10.6)
CHLORIDE BLD-SCNC: 106 MMOL/L (ref 99–110)
CO2: 24 MMOL/L (ref 21–32)
CREAT SERPL-MCNC: <0.5 MG/DL (ref 0.6–1.2)
DESCRIPTION BLOOD BANK: NORMAL
EOSINOPHILS ABSOLUTE: 0.4 K/UL (ref 0–0.6)
EOSINOPHILS RELATIVE PERCENT: 9.1 %
FERRITIN: 205.7 NG/ML (ref 15–150)
FOLATE: 14.64 NG/ML (ref 4.78–24.2)
GFR AFRICAN AMERICAN: >60
GFR NON-AFRICAN AMERICAN: >60
GLUCOSE BLD-MCNC: 101 MG/DL (ref 70–99)
HAPTOGLOBIN: 239 MG/DL (ref 30–200)
HCT VFR BLD CALC: 19.7 % (ref 36–48)
HCT VFR BLD CALC: 24.1 % (ref 36–48)
HCT VFR BLD CALC: 24.1 % (ref 36–48)
HEMOGLOBIN: 6.6 G/DL (ref 12–16)
HEMOGLOBIN: 8.1 G/DL (ref 12–16)
IMMATURE RETIC FRACT: 0.66 (ref 0.21–0.37)
IRON SATURATION: 36 % (ref 15–50)
IRON: 69 UG/DL (ref 37–145)
LACTATE DEHYDROGENASE: 198 U/L (ref 100–190)
LYMPHOCYTES ABSOLUTE: 1 K/UL (ref 1–5.1)
LYMPHOCYTES RELATIVE PERCENT: 22.9 %
MCH RBC QN AUTO: 30.7 PG (ref 26–34)
MCHC RBC AUTO-ENTMCNC: 33.6 G/DL (ref 31–36)
MCV RBC AUTO: 91.6 FL (ref 80–100)
MONOCYTES ABSOLUTE: 0.3 K/UL (ref 0–1.3)
MONOCYTES RELATIVE PERCENT: 7.1 %
NEUTROPHILS ABSOLUTE: 2.5 K/UL (ref 1.7–7.7)
NEUTROPHILS RELATIVE PERCENT: 60.3 %
OCCULT BLOOD DIAGNOSTIC: NORMAL
OSMOLALITY URINE: 594 MOSM/KG (ref 390–1070)
PDW BLD-RTO: 13.3 % (ref 12.4–15.4)
PLATELET # BLD: 241 K/UL (ref 135–450)
PMV BLD AUTO: 8.1 FL (ref 5–10.5)
POTASSIUM SERPL-SCNC: 3.9 MMOL/L (ref 3.5–5.1)
RBC # BLD: 2.15 M/UL (ref 4–5.2)
RETICULOCYTE ABSOLUTE COUNT: 0.08 M/UL (ref 0.02–0.1)
RETICULOCYTE COUNT PCT: 3.14 % (ref 0.5–2.18)
SODIUM BLD-SCNC: 137 MMOL/L (ref 136–145)
SODIUM BLD-SCNC: 137 MMOL/L (ref 136–145)
SODIUM BLD-SCNC: 139 MMOL/L (ref 136–145)
SODIUM URINE: 26 MMOL/L
TOTAL IRON BINDING CAPACITY: 191 UG/DL (ref 260–445)
TOTAL PROTEIN: 5.1 G/DL (ref 6.4–8.2)
VITAMIN B-12: 1528 PG/ML (ref 211–911)
WBC # BLD: 4.2 K/UL (ref 4–11)

## 2022-09-25 PROCEDURE — 85014 HEMATOCRIT: CPT

## 2022-09-25 PROCEDURE — 6370000000 HC RX 637 (ALT 250 FOR IP): Performed by: NURSE PRACTITIONER

## 2022-09-25 PROCEDURE — 6360000002 HC RX W HCPCS: Performed by: NURSE PRACTITIONER

## 2022-09-25 PROCEDURE — 85025 COMPLETE CBC W/AUTO DIFF WBC: CPT

## 2022-09-25 PROCEDURE — 80053 COMPREHEN METABOLIC PANEL: CPT

## 2022-09-25 PROCEDURE — 83540 ASSAY OF IRON: CPT

## 2022-09-25 PROCEDURE — 2580000003 HC RX 258: Performed by: PHYSICIAN ASSISTANT

## 2022-09-25 PROCEDURE — 6370000000 HC RX 637 (ALT 250 FOR IP): Performed by: PHYSICIAN ASSISTANT

## 2022-09-25 PROCEDURE — 86901 BLOOD TYPING SEROLOGIC RH(D): CPT

## 2022-09-25 PROCEDURE — 82728 ASSAY OF FERRITIN: CPT

## 2022-09-25 PROCEDURE — 84295 ASSAY OF SERUM SODIUM: CPT

## 2022-09-25 PROCEDURE — 86850 RBC ANTIBODY SCREEN: CPT

## 2022-09-25 PROCEDURE — 83615 LACTATE (LD) (LDH) ENZYME: CPT

## 2022-09-25 PROCEDURE — 6370000000 HC RX 637 (ALT 250 FOR IP): Performed by: HOSPITALIST

## 2022-09-25 PROCEDURE — P9016 RBC LEUKOCYTES REDUCED: HCPCS

## 2022-09-25 PROCEDURE — 86900 BLOOD TYPING SEROLOGIC ABO: CPT

## 2022-09-25 PROCEDURE — 82746 ASSAY OF FOLIC ACID SERUM: CPT

## 2022-09-25 PROCEDURE — 36415 COLL VENOUS BLD VENIPUNCTURE: CPT

## 2022-09-25 PROCEDURE — 85018 HEMOGLOBIN: CPT

## 2022-09-25 PROCEDURE — 2500000003 HC RX 250 WO HCPCS: Performed by: PHYSICIAN ASSISTANT

## 2022-09-25 PROCEDURE — 83010 ASSAY OF HAPTOGLOBIN QUANT: CPT

## 2022-09-25 PROCEDURE — 82607 VITAMIN B-12: CPT

## 2022-09-25 PROCEDURE — 1200000000 HC SEMI PRIVATE

## 2022-09-25 PROCEDURE — 6370000000 HC RX 637 (ALT 250 FOR IP): Performed by: INTERNAL MEDICINE

## 2022-09-25 PROCEDURE — 85045 AUTOMATED RETICULOCYTE COUNT: CPT

## 2022-09-25 PROCEDURE — 36430 TRANSFUSION BLD/BLD COMPNT: CPT

## 2022-09-25 PROCEDURE — 82270 OCCULT BLOOD FECES: CPT

## 2022-09-25 PROCEDURE — 97535 SELF CARE MNGMENT TRAINING: CPT

## 2022-09-25 PROCEDURE — 83550 IRON BINDING TEST: CPT

## 2022-09-25 PROCEDURE — 86923 COMPATIBILITY TEST ELECTRIC: CPT

## 2022-09-25 RX ORDER — SODIUM CHLORIDE 9 MG/ML
INJECTION, SOLUTION INTRAVENOUS PRN
Status: DISCONTINUED | OUTPATIENT
Start: 2022-09-25 | End: 2022-09-26 | Stop reason: HOSPADM

## 2022-09-25 RX ADMIN — ACETAMINOPHEN 1000 MG: 500 TABLET ORAL at 08:31

## 2022-09-25 RX ADMIN — DIAZEPAM 2 MG: 2 TABLET ORAL at 22:08

## 2022-09-25 RX ADMIN — ACETAMINOPHEN 1000 MG: 500 TABLET ORAL at 20:32

## 2022-09-25 RX ADMIN — ACETAMINOPHEN 1000 MG: 500 TABLET ORAL at 15:28

## 2022-09-25 RX ADMIN — Medication 10 ML: at 21:22

## 2022-09-25 RX ADMIN — Medication 10 ML: at 09:06

## 2022-09-25 RX ADMIN — ATORVASTATIN CALCIUM 10 MG: 10 TABLET, FILM COATED ORAL at 08:31

## 2022-09-25 RX ADMIN — OXYCODONE 10 MG: 5 TABLET ORAL at 04:22

## 2022-09-25 RX ADMIN — DIAZEPAM 2 MG: 2 TABLET ORAL at 10:09

## 2022-09-25 RX ADMIN — CYANOCOBALAMIN TAB 1000 MCG 500 MCG: 1000 TAB at 08:32

## 2022-09-25 RX ADMIN — LABETALOL HYDROCHLORIDE 10 MG: 5 INJECTION, SOLUTION INTRAVENOUS at 20:32

## 2022-09-25 RX ADMIN — OXYCODONE 5 MG: 5 TABLET ORAL at 15:28

## 2022-09-25 RX ADMIN — OXYCODONE 10 MG: 5 TABLET ORAL at 20:31

## 2022-09-25 RX ADMIN — LISINOPRIL 20 MG: 20 TABLET ORAL at 08:32

## 2022-09-25 RX ADMIN — KETOROLAC TROMETHAMINE 30 MG: 30 INJECTION, SOLUTION INTRAMUSCULAR at 22:08

## 2022-09-25 ASSESSMENT — PAIN SCALES - GENERAL
PAINLEVEL_OUTOF10: 7
PAINLEVEL_OUTOF10: 5
PAINLEVEL_OUTOF10: 7
PAINLEVEL_OUTOF10: 6
PAINLEVEL_OUTOF10: 7
PAINLEVEL_OUTOF10: 5
PAINLEVEL_OUTOF10: 5

## 2022-09-25 ASSESSMENT — PAIN DESCRIPTION - DESCRIPTORS
DESCRIPTORS: ACHING;CRAMPING
DESCRIPTORS: ACHING
DESCRIPTORS: ACHING

## 2022-09-25 ASSESSMENT — PAIN DESCRIPTION - ORIENTATION
ORIENTATION: RIGHT

## 2022-09-25 ASSESSMENT — PAIN DESCRIPTION - LOCATION
LOCATION: KNEE
LOCATION: KNEE
LOCATION: LEG

## 2022-09-25 NOTE — PLAN OF CARE
Problem: Discharge Planning  Goal: Discharge to home or other facility with appropriate resources  9/25/2022 1145 by Hetty Epley, RN  Outcome: Progressing  9/24/2022 2254 by Francisco Javier Flores RN  Outcome: Progressing     Problem: Pain  Goal: Verbalizes/displays adequate comfort level or baseline comfort level  9/25/2022 1145 by Hetty Epley, RN  Outcome: Progressing  9/24/2022 2254 by Francisco Javier Flores RN  Outcome: Progressing     Problem: Safety - Adult  Goal: Free from fall injury  9/25/2022 1145 by Hetty Epley, RN  Outcome: Progressing  9/24/2022 2254 by Francisco Javier Flores RN  Outcome: Progressing     Problem: Skin/Tissue Integrity - Adult  Goal: Skin integrity remains intact  9/25/2022 1145 by Hetty Epley, RN  Outcome: Progressing  9/24/2022 2254 by Francisco Javier Flores RN  Outcome: Progressing  Goal: Incisions, wounds, or drain sites healing without S/S of infection  9/25/2022 1145 by Hetty Epley, RN  Outcome: Progressing  9/24/2022 2254 by Francisco Javier Flores RN  Outcome: Progressing     Problem: Musculoskeletal - Adult  Goal: Return mobility to safest level of function  9/25/2022 1145 by Hetty Epley, RN  Outcome: Progressing  9/24/2022 2254 by Francisco Javier Flores RN  Outcome: Progressing  Goal: Maintain proper alignment of affected body part  9/25/2022 1145 by Hetty Epley, RN  Outcome: Progressing  9/24/2022 2254 by Francisco Javier Flores RN  Outcome: Progressing  Goal: Return ADL status to a safe level of function  9/25/2022 1145 by Hetty Epley, RN  Outcome: Progressing  9/24/2022 2254 by Francisco Javier Flores RN  Outcome: Progressing     Problem: Genitourinary - Adult  Goal: Absence of urinary retention  9/25/2022 1145 by Hetty Epley, RN  Outcome: Progressing  9/24/2022 2254 by Francisco Javier Flores RN  Outcome: Progressing  Goal: Urinary catheter remains patent  9/25/2022 1145 by Hetty Epley, RN  Outcome: Progressing  9/24/2022 2254 by Francisco Javier Flores RN  Outcome: Progressing     Problem: Skin/Tissue Integrity  Goal: Absence of new skin breakdown  Description: 1. Monitor for areas of redness and/or skin breakdown  2. Assess vascular access sites hourly  3. Every 4-6 hours minimum:  Change oxygen saturation probe site  4. Every 4-6 hours:  If on nasal continuous positive airway pressure, respiratory therapy assess nares and determine need for appliance change or resting period.   9/25/2022 1145 by Marsha Carmona RN  Outcome: Progressing  9/24/2022 2254 by Twan Brownlee RN  Outcome: Progressing

## 2022-09-25 NOTE — PLAN OF CARE
Problem: Discharge Planning  Goal: Discharge to home or other facility with appropriate resources  9/24/2022 2254 by Stacy Garrett RN  Outcome: Progressing  9/24/2022 1135 by Cleopatra Avila RN  Outcome: Progressing     Problem: Pain  Goal: Verbalizes/displays adequate comfort level or baseline comfort level  9/24/2022 2254 by Stacy Garrett RN  Outcome: Progressing  9/24/2022 1135 by Cleopatra Avila RN  Outcome: Progressing     Problem: Safety - Adult  Goal: Free from fall injury  9/24/2022 2254 by Stacy Garrett RN  Outcome: Progressing  9/24/2022 1135 by Cleopatra Avila RN  Outcome: Progressing     Problem: Skin/Tissue Integrity - Adult  Goal: Skin integrity remains intact  9/24/2022 2254 by Stacy Garrett RN  Outcome: Progressing  9/24/2022 1135 by Cleopatra Avila RN  Outcome: Progressing  Goal: Incisions, wounds, or drain sites healing without S/S of infection  9/24/2022 2254 by Stacy Garrett RN  Outcome: Progressing  9/24/2022 1135 by Cleopatra Avila RN  Outcome: Progressing     Problem: Musculoskeletal - Adult  Goal: Return mobility to safest level of function  9/24/2022 2254 by Stacy Garrett RN  Outcome: Progressing  9/24/2022 1135 by Cleopatra Avila RN  Outcome: Progressing  Goal: Maintain proper alignment of affected body part  9/24/2022 2254 by Stacy Garrett RN  Outcome: Progressing  9/24/2022 1135 by Cleopatra Avila RN  Outcome: Progressing  Goal: Return ADL status to a safe level of function  9/24/2022 2254 by Stacy Garrett RN  Outcome: Progressing  9/24/2022 1135 by Cleopatra Avila RN  Outcome: Progressing     Problem: Genitourinary - Adult  Goal: Absence of urinary retention  9/24/2022 2254 by Stacy Garrett RN  Outcome: Progressing  9/24/2022 1135 by Cleopatra Avila RN  Outcome: Progressing  Goal: Urinary catheter remains patent  9/24/2022 2254 by Stacy Garrett RN  Outcome: Progressing  9/24/2022 1135 by Cleopatra Avila RN  Outcome: Progressing     Problem: Skin/Tissue Integrity  Goal: Absence of new skin breakdown  Description: 1. Monitor for areas of redness and/or skin breakdown  2. Assess vascular access sites hourly  3. Every 4-6 hours minimum:  Change oxygen saturation probe site  4. Every 4-6 hours:  If on nasal continuous positive airway pressure, respiratory therapy assess nares and determine need for appliance change or resting period.   9/24/2022 2254 by Gilbert Chavez RN  Outcome: Progressing  9/24/2022 1135 by Madelyn Romero RN  Outcome: Progressing

## 2022-09-25 NOTE — PROGRESS NOTES
Hospitalist Progress Note      PCP: Anibal Mcgowan MD    Date of Admission: 9/19/2022    Chief Complaint:   Right knee pain    Hospital Course: Christopher Rodrigues is a 71 y.o. female who presents to ED for evaluation of right knee pain s/p mechanical fall- slipped on the second to last step while going down the stairs and landed with her right leg underneath and behind her. No dizziness prior to fall. No fever, chest pain, shortness of breath, cough, abdominal pain, nausea, vomiting, diarrhea, constipation, urinary symptoms. X-ray shower a displaced comminuted fracture of the distal femur.   Fracture was reduced in ED and splint was placed and had Right femur ORIF with lateral locking plate and IMN 8/95      Subjective:   Still has right knee pain   Has anxiety- needed daizpam prn      Medications:  Reviewed    Infusion Medications    sodium chloride      sodium chloride      sodium chloride      sodium chloride       Scheduled Medications    lisinopril  20 mg Oral Daily    atorvastatin  10 mg Oral Daily    vitamin B-12  500 mcg Oral Daily    sodium chloride flush  10 mL IntraVENous 2 times per day    enoxaparin  40 mg SubCUTAneous Nightly    acetaminophen  1,000 mg Oral TID    polyethylene glycol  17 g Oral Daily     PRN Meds: sodium chloride, ketorolac, sodium chloride, sodium chloride, sodium chloride flush, sodium chloride, magnesium hydroxide, acetaminophen **OR** acetaminophen, labetalol, oxyCODONE **OR** oxyCODONE, tiZANidine, diazePAM, ondansetron **OR** ondansetron      Intake/Output Summary (Last 24 hours) at 9/25/2022 0931  Last data filed at 9/24/2022 2148  Gross per 24 hour   Intake --   Output 700 ml   Net -700 ml         Physical Exam Performed:    BP (!) 178/80   Pulse 100   Temp 97.7 °F (36.5 °C) (Oral)   Resp 18   Ht 5' 8\" (1.727 m)   Wt 140 lb (63.5 kg)   LMP 02/14/1992   SpO2 97%   BMI 21.29 kg/m²     General appearance:  Awake, alert, no apparent distress  HEENT: Normocephalic, atraumatic without obvious deformity. PERRL. EOM intact. Conjunctivae/corneas clear. Neck: Supple, with full range of motion. No JVD. Trachea midline. Respiratory:  Clear to auscultation bilaterally without rales, wheezes, or rhonchi. Normal respiratory effort. Cardiovascular:  Regular rate and rhythm without murmurs, rubs or gallops. Abdomen: Soft, NT, ND, without rebound or guarding. Normal bowel sounds. Extremities:  Right knee immobilizer in place. No clubbing, cyanosis, or edema bilaterally. +2 palpable pulses, equal bilaterally. Capillary refill brisk,< 3 seconds   Skin: No rashes or lesions. Warm/dry. Neurologic:  Neurovascularly intact without any focal sensory/motor deficits. Cranial nerves: II-XII intact, grossly non-focal. Alert and oriented x 3. Normal speech. Psychiatric:  Thought content appropriate, normal insight. Labs:   Recent Labs     09/25/22 0425   WBC 4.2   HGB 6.6*   HCT 19.7*          Recent Labs     09/24/22  2104 09/25/22 0425   * 137  139   K  --  3.9   CL  --  106   CO2  --  24   BUN  --  14   CREATININE  --  <0.5*   CALCIUM  --  8.4       Recent Labs     09/25/22 0425   AST 19   ALT 15   BILITOT 0.9   ALKPHOS 41       No results for input(s): INR in the last 72 hours. No results for input(s): Awa Cumming in the last 72 hours. Urinalysis:      Lab Results   Component Value Date/Time    NITRU Negative 01/17/2020 01:53 PM    BLOODU Negative 01/17/2020 01:53 PM    SPECGRAV 1.010 01/17/2020 01:53 PM    GLUCOSEU Negative 01/17/2020 01:53 PM       Radiology:  XR FEMUR RIGHT (MIN 2 VIEWS)   Final Result   Postoperative changes of the right femur. XR FEMUR RIGHT (MIN 2 VIEWS)   Final Result      CT FEMUR RIGHT WO CONTRAST   Final Result   1. Comminuted periprosthetic distal femur fracture, with extension through   the lateral femoral condyle.   Associated lipohemarthrosis with additional   hemorrhage and edema in the vastus intermedius muscle. XR KNEE RIGHT (1-2 VIEWS)   Final Result   Mild improved angulation of the comminuted displaced fracture of the distal   femoral metadiaphysis. XR HIP 2-3 VW W PELVIS RIGHT   Final Result      Osteoarthritic change of the right hip. No evidence of fracture or   malalignment. XR KNEE RIGHT (1-2 VIEWS)   Final Result      Comminuted fracture of the distal femoral shaft above a uninvolved total knee   arthroplasty. The fracture is displaced and angulated anteriorly. ASSESSMENT/PLAN:     Closed fracture of right distal femur on x-ray. comminuted fracture of the distal femur  Reduced in ED. Splint in place  Pain control  Had Right femur ORIF with lateral locking plate and IMN 4/62       Hyponatremia  Na 126-->133-->129-->137 after fluid restricition  DC HCTZ   Hold off iv fluids  Fluid restrict to 2000ml/d  Monitor Na qd  Appreciate nephrology advice     HTN  Cont 10mg po qd Lisinopril  IV Labetalol PRN     HLD  Continue home lovastatin (sub w/ atorvastatin)    Anxiety  PRN Diazepam  Consult to psych to advice    New problems; Anemia, Hb 6.6 from 8.6  Getting 1U PRBCs  Check [place]/Fol/B12/TIBC/Hapto/LDH/retics        DVT prophylaxis: Lovenox  Probiotic if on abx: N/A     Diet: ADULT DIET;  Regular  Diet NPO Exceptions are: Sips of Water with Meds  Code Status: Full Code     Consults:  IP CONSULT TO ORTHOPEDIC SURGERY     Disposition: Admit to Inpatient     ELOS: Greater than two midnights due to medical therapy     Jovon Esteban MD

## 2022-09-25 NOTE — PROGRESS NOTES
[facility]   Phone: [phone]    Occupational Therapy    [] Initial Evaluation            [x] Daily Treatment Note         [] Discharge Summary      Patient: Aleena Colby   : 1950   MRN: [id number]   Date of Service:  2022    Admitting Diagnosis:  Closed fracture of distal end of right femur, unspecified fracture morphology, initial encounter [facility])  Current Admission Summary: Pt admitted with fall 22, found to have (R) distal femur fx, s/p ORIF 22  Past Medical History:  has a past medical history of Arthritis, Hyperlipidemia, and Hypertension. Past Surgical History:  has a past surgical history that includes Middle ear surgery; Varicose vein surgery; Total knee arthroplasty (Right, 2020); and Femur fracture surgery (Right, 2022). Discharge Recommendations: Aleena Colby scored a 18/24 on the AM-PAC ADL Inpatient form. Current research shows that an AM-PAC score of 17 or less is typically not associated with a discharge to the patient's home setting. Based on the patient's AM-PAC score and their current ADL deficits, it is recommended that the patient have 5-7 sessions per week of Occupational Therapy at d/c to increase the patient's independence. At this time, this patient demonstrates complex nursing, medical, and rehabilitative needs, and would benefit from intensive rehabilitation services upon discharge from the Inpatient setting. This patient demonstrates the ability to participate in and benefit from an intensive therapy program with a coordinated interdisciplinary team approach to foster frequent, structured, and documented communication among disciplines, who will work together to establish, prioritize, and achieve treatment goals. Please see assessment section for further patient specific details. If patient discharges prior to next session this note will serve as a discharge summary.   Please see below for the latest assessment towards goals. DME Required For Discharge: DME to be determined at next level of care    Precautions/Restrictions: high fall risk  Weight Bearing Restrictions: weight bearing as tolerated  [] Right Upper Extremity  [] Left Upper Extremity [x] Right Lower Extremity  [] Left Lower Extremity     Required Braces/Orthotics: hinged knee brace locked in extension   [x] Right  [] Left  Positional Restrictions: No ROM of R knee, requires hinged knee brace OOB, allowed to take breaks while in bed or in recliner with legs extended per Ortho NP    Pre-Admission Information   Lives With: alone    Type of Home: house  Home Layout: quad level   Home Access:  1 step to enter without rails   Bathroom Layout: walk in shower  Bathroom Equipment: shower chair, Comment: suction grab bar  Toilet Height: standard height  Home Equipment: rolling walker, single point cane  Transfer Assistance: Independent without use of device  Ambulation Assistance:Independent without use of device  ADL Assistance: independent with all ADL's  IADL Assistance: independent with homemaking tasks  Active :        [x] Yes  [] No  Hand Dominance: [] Left  [] Right  Current Employment: unemployed  Hobbies: Reading  Recent Falls: 1 other fall besides one for admission      Subjective  General: Pt found on bathroom commode alone upon arrival. Pt stating the PCA assisted to commode, pleasant and agreeable to OT session. Pt denies pn, but reports feeling very \"out of it\", stating she thinks because she was woken for breakfast. Mid-way through session RN arrived to assess vitals and report pts need of 1L of blood. ADLs completed in stance PRN after this report. Pt with hinged knee brace on throughout, pt left in B LE extension at EOS.    Pain: 0/10  Pain Interventions: pain medication in place prior to arrival Ice pack applied at EOS to [place] of Daily Living  Basic Activities of Daily Living  Feeding: Independent  Grooming: stand by assistance  Grooming Comments: Oral care/hand wash in stance w/RW at bathroom sink  Upper Extremity Bathing: setup assistance  Lower Extremity Bathing: setup assistance   Bathing Comments: Pt sponge bathe in stance w/RW at room chair, pt with good dynamic balance and endurance, LE bathing calos-hygiene only this date  Upper Extremity Dressing: minimal assistance  Lower Extremity Dressing: moderate assistance  Dressing Comments: Gown change seated in room chair, ModA to thread R LE into brief/street shorts in stance w/RW. Toileting: modified independent. Toileting Equipment: none  Toileting Comments: Use of R grab bar during front pericare    Comment: Increased time to complete, pt with slow pacing of all functional movement    Instrumental Activities of Daily Living  No IADL completed on this date. Functional Mobility  Bed Mobility  Bed mobility not completed on this date. Comments:   Transfers  Sit to stand transfer:stand by assistance, contact guard assistance  Stand to sit transfer: stand by assistance, contact guard assistance  Stand step transfer: contact guard assistance  Toilet transfer: contact guard assistance  Toilet transfer equipment: standard bedside commode, grab bars, walker  Comment: All Sit><Stand CGA progressing to SBA, cueing for safe distance of self to chair    Functional Mobility:  Sitting Balance: stand by assistance. Standing Balance: stand by assistance.     Functional Mobility: .  contact guard assistance  Functional Mobility Activity: to/from bathroom  Functional Mobility Device Use: rolling walker   Comment: To/From bathroom x2, total ambulation distance ~25 feet    Other Therapeutic Interventions    Functional Outcomes  AM-PAC Inpatient Daily Activity Raw Score: 18    Cognition  WFL  Orientation:    alert and oriented x 4  Command Following:   [facility]     Education  Barriers To Learning: none  Patient Education: patient educated on goals, OT role and benefits, plan of care, ADL adaptive strategies, IADL safety, weight-bearing education, energy conservation, transfer training, discharge recommendations  Learning Assessment:  patient verbalizes and demonstrates understanding    Assessment  Activity Tolerance: Good  Impairments Requiring Therapeutic Intervention: decreased functional mobility, decreased ADL status, decreased ROM, decreased strength, decreased safety awareness, decreased endurance, decreased IADL, increased pain  Prognosis: good  Clinical Assessment: Pt present below baseline d/t above deficits. Pt is progressing well, currently ModA for LB dressing, SBA for toileting and CGA for functional transfers. Prior to admission pt was IND in all ADLs/IADLs. Pt would benefit from continued OT services to facilitate return to PLOF.   Safety Interventions: patient left in chair, chair alarm in place, call light within reach, patient at risk for falls, and nurse notified    Plan  Frequency: 7 x/week  Current Treatment Recommendations: strengthening, ROM, balance training, functional mobility training, transfer training, endurance training, patient/caregiver education, ADL/self-care training, IADL training, and safety education    Goals  Short Term Goals:  Time Frame: By discharge  Patient will complete lower body ADL at minimal assistance - continue goal   Patient will complete toileting at stand by assistance - Met, continue goal 9/25  Patient will complete functional transfers at contact guard assistance - goal met 9/23  Patient will complete functional mobility at moderate assistance - goal met 9/23  Patient will increase functional standing balance to SBA for improved ADL completion- goal met 9/23    Therapy Session Time     Individual Group Co-treatment   Time In 0735     Time Out 0828     Minutes 53          Timed Code Treatment Minutes:  53 minutes  Total Treatment Minutes:  53 minutes     Electronically Signed By: NOLAN Montez/[id number]

## 2022-09-25 NOTE — PROGRESS NOTES
Shift assessment complete, VSS, meds given per MAR. Call light within reach and fall precautions in place. Patient remains free of falls or accidents. The care plan and education has been reviewed and mutually agreed upon with the patient.

## 2022-09-25 NOTE — PROGRESS NOTES
f  [facility]   Nephrology progress Note  [phone]   [facility] [employer]. [facility]           Reason for Consult:  hyponatremia     HISTORY OF PRESENT ILLNESS:      The patient is a 71 y.o. female with significant past medical history of hypertension, hyperlipidemia who presents with fall (9/19/2022) She underwent open reduction internal fixation (9/21/2022). She is noted to be hyponatremic post procedure and therefore nephrology is consulted. She came in with Na 126 >> which improved to 133 and post op now decreased to 129 this AM. She has been on IVF since 9/20 and sodium has downtrended since then. She has been on lisinopril/HCTZ prior to hospitalization and also reports drinking >125 oz of water per day. Says she likes it. Per chart review, she appears to have a history of chronic hyponatremia as well likely from hctz use and high free water intake. Today upon my evaluation, she is tolerating diet, continues to drink a lot of water per preference. She still has pain at op site that is not completely controlled. She denies nausea, headaches, dizziness, chest pain or shortness of breath. Interval history: feels well this AM. Anemic, getting PRBC. Na 137 today. Denies headaches or dizziness. Drinking less water    Current Medications:     lisinopril  20 mg Oral Daily    atorvastatin  10 mg Oral Daily    vitamin B-12  500 mcg Oral Daily    sodium chloride flush  10 mL IntraVENous 2 times per day    enoxaparin  40 mg SubCUTAneous Nightly    acetaminophen  1,000 mg Oral TID    polyethylene glycol  17 g Oral Daily        PHYSICAL EXAM:    Vitals:    BP (!) 163/70   Pulse 97   Temp 97.7 °F (36.5 °C)   Resp 17   Ht 5' 8\" (1.727 m)   Wt 140 lb (63.5 kg)   LMP 02/14/1992   SpO2 97%   BMI 21.29 kg/m²   I/O last 3 completed shifts:  In: -   Out: 1000 [Urine:1000]  No intake/output data recorded.     Physical Exam:  Gen:  alert, oriented x 3  PERRL , EOM +  Pallor +, No icterus  JVP not raised   CV: RRR no murmur or rub . Lungs:B/ L air entry, Normal breath sounds   Abd: soft, bowel sounds + , No organomegaly   Ext: No edema, no cyanosis  Skin: Warm. No rash    Neuro: nonfocal.      DATA:    CBC:   Lab Results   Component Value Date/Time    WBC 4.2 09/25/2022 04:25 AM    RBC 2.15 09/25/2022 04:25 AM    HGB 6.6 09/25/2022 04:25 AM    HCT 19.7 09/25/2022 04:25 AM    MCV 91.6 09/25/2022 04:25 AM    MCH 30.7 09/25/2022 04:25 AM    MCHC 33.6 09/25/2022 04:25 AM    RDW 13.3 09/25/2022 04:25 AM     09/25/2022 04:25 AM    MPV 8.1 09/25/2022 04:25 AM     BMP:    Lab Results   Component Value Date/Time     09/25/2022 04:25 AM     09/25/2022 04:25 AM    K 3.9 09/25/2022 04:25 AM    K 3.6 09/22/2022 05:23 AM     09/25/2022 04:25 AM    CO2 24 09/25/2022 04:25 AM    BUN 14 09/25/2022 04:25 AM    LABALBU 2.7 09/25/2022 04:25 AM    CREATININE <0.5 09/25/2022 04:25 AM    CALCIUM 8.4 09/25/2022 04:25 AM    GFRAA >60 09/25/2022 04:25 AM    GFRAA >60 02/19/2013 06:21 AM    LABGLOM >60 09/25/2022 04:25 AM    GLUCOSE 101 09/25/2022 04:25 AM     Magnesium:  No results found for: MG  Phosphorus:    Lab Results   Component Value Date/Time    PHOS 4.2 08/24/2022 08:29 AM     U/A:    Lab Results   Component Value Date/Time    COLORU YELLOW 01/17/2020 01:53 PM    PROTEINU Negative 01/17/2020 01:53 PM    PHUR 8.0 01/17/2020 01:53 PM    CLARITYU Clear 01/17/2020 01:53 PM    SPECGRAV 1.010 01/17/2020 01:53 PM    LEUKOCYTESUR Negative 01/17/2020 01:53 PM    UROBILINOGEN 0.2 01/17/2020 01:53 PM    BILIRUBINUR Negative 01/17/2020 01:53 PM    BLOODU Negative 01/17/2020 01:53 PM    GLUCOSEU Negative 01/17/2020 01:53 PM           IMPRESSION/RECOMMENDATIONS:      Euvolemic hypotonic hyponatremia - inpatient etiology of continued decline of sodium is 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 Uncontrolled pain is contributing to SIADH as well.  Therefore would recommend the following:  -- fluid restriction to 2L per day  -- stop IVF. Hold HCTZ  -- high protein diet with protein shakes with each meal (these do not count toward fluid restriction)  -- no indication for 3% saline as patient is not symptomatic at this time. -- Na 137 this AM. Goal is to maintain this over the next 24 hours. Hypertension - uncontrolled. Resume lisinopril, increase dose    Fall S/p ORIF RIGHT distal femur periprosthetic fx (9/21/22) - ortho following. Thank you for allowing me to participate in the care of this patient. I will continue to follow along. Please call with questions.     Redmond Epley, DO, MD  9/25/2022  [facility]

## 2022-09-26 VITALS
RESPIRATION RATE: 16 BRPM | DIASTOLIC BLOOD PRESSURE: 79 MMHG | TEMPERATURE: 97.8 F | HEIGHT: 68 IN | BODY MASS INDEX: 21.22 KG/M2 | SYSTOLIC BLOOD PRESSURE: 169 MMHG | WEIGHT: 140 LBS | HEART RATE: 93 BPM | OXYGEN SATURATION: 98 %

## 2022-09-26 LAB
ANION GAP SERPL CALCULATED.3IONS-SCNC: 9 MMOL/L (ref 3–16)
BUN BLDV-MCNC: 14 MG/DL (ref 7–20)
CALCIUM SERPL-MCNC: 8.7 MG/DL (ref 8.3–10.6)
CHLORIDE BLD-SCNC: 106 MMOL/L (ref 99–110)
CO2: 25 MMOL/L (ref 21–32)
CREAT SERPL-MCNC: <0.5 MG/DL (ref 0.6–1.2)
GFR AFRICAN AMERICAN: >60
GFR NON-AFRICAN AMERICAN: >60
GLUCOSE BLD-MCNC: 129 MG/DL (ref 70–99)
HCT VFR BLD CALC: 24.2 % (ref 36–48)
HEMOGLOBIN: 8.1 G/DL (ref 12–16)
MCH RBC QN AUTO: 30.8 PG (ref 26–34)
MCHC RBC AUTO-ENTMCNC: 33.6 G/DL (ref 31–36)
MCV RBC AUTO: 91.6 FL (ref 80–100)
PDW BLD-RTO: 13.9 % (ref 12.4–15.4)
PLATELET # BLD: 336 K/UL (ref 135–450)
PMV BLD AUTO: 7.7 FL (ref 5–10.5)
POTASSIUM SERPL-SCNC: 3.7 MMOL/L (ref 3.5–5.1)
RBC # BLD: 2.64 M/UL (ref 4–5.2)
SODIUM BLD-SCNC: 140 MMOL/L (ref 136–145)
WBC # BLD: 4.1 K/UL (ref 4–11)

## 2022-09-26 PROCEDURE — 6370000000 HC RX 637 (ALT 250 FOR IP): Performed by: HOSPITALIST

## 2022-09-26 PROCEDURE — 80048 BASIC METABOLIC PNL TOTAL CA: CPT

## 2022-09-26 PROCEDURE — 6370000000 HC RX 637 (ALT 250 FOR IP): Performed by: INTERNAL MEDICINE

## 2022-09-26 PROCEDURE — 97116 GAIT TRAINING THERAPY: CPT

## 2022-09-26 PROCEDURE — 97110 THERAPEUTIC EXERCISES: CPT

## 2022-09-26 PROCEDURE — 2580000003 HC RX 258: Performed by: PHYSICIAN ASSISTANT

## 2022-09-26 PROCEDURE — 85027 COMPLETE CBC AUTOMATED: CPT

## 2022-09-26 PROCEDURE — 97530 THERAPEUTIC ACTIVITIES: CPT

## 2022-09-26 PROCEDURE — 99024 POSTOP FOLLOW-UP VISIT: CPT | Performed by: NURSE PRACTITIONER

## 2022-09-26 PROCEDURE — 36415 COLL VENOUS BLD VENIPUNCTURE: CPT

## 2022-09-26 PROCEDURE — APPNB45 APP NON BILLABLE 31-45 MINUTES: Performed by: NURSE PRACTITIONER

## 2022-09-26 PROCEDURE — 6360000002 HC RX W HCPCS: Performed by: PHYSICIAN ASSISTANT

## 2022-09-26 PROCEDURE — 6370000000 HC RX 637 (ALT 250 FOR IP): Performed by: NURSE PRACTITIONER

## 2022-09-26 PROCEDURE — 6370000000 HC RX 637 (ALT 250 FOR IP): Performed by: PHYSICIAN ASSISTANT

## 2022-09-26 RX ORDER — ONDANSETRON 4 MG/1
4 TABLET, ORALLY DISINTEGRATING ORAL EVERY 8 HOURS PRN
Qty: 10 TABLET | Refills: 2 | Status: SHIPPED | OUTPATIENT
Start: 2022-09-26 | End: 2022-10-17

## 2022-09-26 RX ORDER — AMLODIPINE BESYLATE 5 MG/1
5 TABLET ORAL 2 TIMES DAILY
Status: DISCONTINUED | OUTPATIENT
Start: 2022-09-26 | End: 2022-09-26 | Stop reason: HOSPADM

## 2022-09-26 RX ORDER — DIAZEPAM 2 MG/1
2 TABLET ORAL EVERY 12 HOURS PRN
Qty: 5 TABLET | Refills: 0 | Status: SHIPPED | OUTPATIENT
Start: 2022-09-26 | End: 2022-10-06

## 2022-09-26 RX ORDER — AMLODIPINE BESYLATE 5 MG/1
5 TABLET ORAL 2 TIMES DAILY
Qty: 30 TABLET | Refills: 3 | Status: SHIPPED | OUTPATIENT
Start: 2022-09-26 | End: 2022-10-17 | Stop reason: SDUPTHER

## 2022-09-26 RX ORDER — TIZANIDINE 4 MG/1
4 TABLET ORAL EVERY 6 HOURS PRN
Qty: 20 TABLET | Refills: 2 | Status: SHIPPED | OUTPATIENT
Start: 2022-09-26 | End: 2022-10-17

## 2022-09-26 RX ORDER — LISINOPRIL 20 MG/1
20 TABLET ORAL DAILY
Qty: 30 TABLET | Refills: 3 | Status: SHIPPED | OUTPATIENT
Start: 2022-09-27 | End: 2022-10-17 | Stop reason: SDUPTHER

## 2022-09-26 RX ADMIN — POLYETHYLENE GLYCOL 3350 17 G: 17 POWDER, FOR SOLUTION ORAL at 08:59

## 2022-09-26 RX ADMIN — ACETAMINOPHEN 1000 MG: 500 TABLET ORAL at 15:27

## 2022-09-26 RX ADMIN — ONDANSETRON 4 MG: 2 INJECTION INTRAMUSCULAR; INTRAVENOUS at 02:57

## 2022-09-26 RX ADMIN — OXYCODONE 5 MG: 5 TABLET ORAL at 08:51

## 2022-09-26 RX ADMIN — ATORVASTATIN CALCIUM 10 MG: 10 TABLET, FILM COATED ORAL at 08:51

## 2022-09-26 RX ADMIN — ACETAMINOPHEN 1000 MG: 500 TABLET ORAL at 08:49

## 2022-09-26 RX ADMIN — AMLODIPINE BESYLATE 5 MG: 5 TABLET ORAL at 11:47

## 2022-09-26 RX ADMIN — LISINOPRIL 20 MG: 20 TABLET ORAL at 08:54

## 2022-09-26 RX ADMIN — Medication 10 ML: at 08:56

## 2022-09-26 RX ADMIN — CYANOCOBALAMIN TAB 1000 MCG 500 MCG: 1000 TAB at 08:51

## 2022-09-26 RX ADMIN — OXYCODONE 10 MG: 5 TABLET ORAL at 16:41

## 2022-09-26 RX ADMIN — DIAZEPAM 2 MG: 2 TABLET ORAL at 11:03

## 2022-09-26 RX ADMIN — OXYCODONE 10 MG: 5 TABLET ORAL at 02:54

## 2022-09-26 ASSESSMENT — PAIN DESCRIPTION - LOCATION
LOCATION: KNEE
LOCATION: LEG
LOCATION: KNEE

## 2022-09-26 ASSESSMENT — PAIN DESCRIPTION - FREQUENCY
FREQUENCY: CONTINUOUS

## 2022-09-26 ASSESSMENT — PAIN DESCRIPTION - DESCRIPTORS
DESCRIPTORS: ACHING
DESCRIPTORS: ACHING
DESCRIPTORS: ACHING;CRAMPING;DISCOMFORT
DESCRIPTORS: ACHING
DESCRIPTORS: ACHING;CRAMPING

## 2022-09-26 ASSESSMENT — PAIN DESCRIPTION - ORIENTATION
ORIENTATION: RIGHT

## 2022-09-26 ASSESSMENT — PAIN SCALES - GENERAL
PAINLEVEL_OUTOF10: 3
PAINLEVEL_OUTOF10: 4
PAINLEVEL_OUTOF10: 4
PAINLEVEL_OUTOF10: 7
PAINLEVEL_OUTOF10: 0
PAINLEVEL_OUTOF10: 4
PAINLEVEL_OUTOF10: 1
PAINLEVEL_OUTOF10: 4
PAINLEVEL_OUTOF10: 2

## 2022-09-26 ASSESSMENT — PAIN DESCRIPTION - PAIN TYPE
TYPE: SURGICAL PAIN

## 2022-09-26 ASSESSMENT — PAIN DESCRIPTION - ONSET
ONSET: ON-GOING

## 2022-09-26 ASSESSMENT — PAIN - FUNCTIONAL ASSESSMENT
PAIN_FUNCTIONAL_ASSESSMENT: PREVENTS OR INTERFERES SOME ACTIVE ACTIVITIES AND ADLS
PAIN_FUNCTIONAL_ASSESSMENT: PREVENTS OR INTERFERES SOME ACTIVE ACTIVITIES AND ADLS
PAIN_FUNCTIONAL_ASSESSMENT: ACTIVITIES ARE NOT PREVENTED
PAIN_FUNCTIONAL_ASSESSMENT: PREVENTS OR INTERFERES SOME ACTIVE ACTIVITIES AND ADLS

## 2022-09-26 NOTE — PROGRESS NOTES
Hospitalist Progress Note      PCP: Ramonita López MD    Date of Admission: 9/19/2022    Chief Complaint:   Right knee pain    Hospital Course: Norris Montes is a 71 y.o. female who presents to ED for evaluation of right knee pain s/p mechanical fall- slipped on the second to last step while going down the stairs and landed with her right leg underneath and behind her. No dizziness prior to fall. No fever, chest pain, shortness of breath, cough, abdominal pain, nausea, vomiting, diarrhea, constipation, urinary symptoms. X-ray shower a displaced comminuted fracture of the distal femur.   Fracture was reduced in ED and splint was placed and had Right femur ORIF with lateral locking plate and IMN 5/28      Subjective:   Still has right knee pain   Has anxiety- needed daizpam prn      Medications:  Reviewed    Infusion Medications    sodium chloride      sodium chloride      sodium chloride      sodium chloride       Scheduled Medications    lisinopril  20 mg Oral Daily    atorvastatin  10 mg Oral Daily    vitamin B-12  500 mcg Oral Daily    sodium chloride flush  10 mL IntraVENous 2 times per day    [Held by provider] enoxaparin  40 mg SubCUTAneous Nightly    acetaminophen  1,000 mg Oral TID    polyethylene glycol  17 g Oral Daily     PRN Meds: sodium chloride, ketorolac, sodium chloride, sodium chloride, sodium chloride flush, sodium chloride, magnesium hydroxide, acetaminophen **OR** acetaminophen, labetalol, oxyCODONE **OR** oxyCODONE, tiZANidine, diazePAM, ondansetron **OR** ondansetron      Intake/Output Summary (Last 24 hours) at 9/26/2022 0806  Last data filed at 9/25/2022 2043  Gross per 24 hour   Intake 537.5 ml   Output 680 ml   Net -142.5 ml         Physical Exam Performed:    BP (!) 177/74   Pulse 88   Temp 98.3 °F (36.8 °C) (Oral)   Resp 16   Ht 5' 8\" (1.727 m)   Wt 140 lb (63.5 kg)   LMP 02/14/1992   SpO2 98%   BMI 21.29 kg/m²     General appearance:  Awake, alert, no apparent distress  HEENT:  Normocephalic, atraumatic without obvious deformity. PERRL. EOM intact. Conjunctivae/corneas clear. Neck: Supple, with full range of motion. No JVD. Trachea midline. Respiratory:  Clear to auscultation bilaterally without rales, wheezes, or rhonchi. Normal respiratory effort. Cardiovascular:  Regular rate and rhythm without murmurs, rubs or gallops. Abdomen: Soft, NT, ND, without rebound or guarding. Normal bowel sounds. Extremities:  Right knee immobilizer in place. No clubbing, cyanosis, or edema bilaterally. +2 palpable pulses, equal bilaterally. Capillary refill brisk,< 3 seconds   Skin: No rashes or lesions. Warm/dry. Neurologic:  Neurovascularly intact without any focal sensory/motor deficits. Cranial nerves: II-XII intact, grossly non-focal. Alert and oriented x 3. Normal speech. Psychiatric:  Thought content appropriate, normal insight. Labs:   Recent Labs     09/25/22  0425 09/25/22  1243 09/25/22  1547 09/26/22  0742   WBC 4.2  --   --  4.1   HGB 6.6*  --  8.1* 8.1*   HCT 19.7* 24.1* 24.1* 24.2*     --   --  336       Recent Labs     09/25/22  0425 09/25/22  1243 09/26/22  0742     139 137 140   K 3.9  --  3.7     --  106   CO2 24  --  25   BUN 14  --  14   CREATININE <0.5*  --  <0.5*   CALCIUM 8.4  --  8.7       Recent Labs     09/25/22  0425   AST 19   ALT 15   BILITOT 0.9   ALKPHOS 41       No results for input(s): INR in the last 72 hours. No results for input(s): Naty Real in the last 72 hours. Urinalysis:      Lab Results   Component Value Date/Time    NITRU Negative 01/17/2020 01:53 PM    BLOODU Negative 01/17/2020 01:53 PM    SPECGRAV 1.010 01/17/2020 01:53 PM    GLUCOSEU Negative 01/17/2020 01:53 PM       Radiology:  XR FEMUR RIGHT (MIN 2 VIEWS)   Final Result   Postoperative changes of the right femur. XR FEMUR RIGHT (MIN 2 VIEWS)   Final Result      CT FEMUR RIGHT WO CONTRAST   Final Result   1.  Comminuted periprosthetic distal femur fracture, with extension through   the lateral femoral condyle. Associated lipohemarthrosis with additional   hemorrhage and edema in the vastus intermedius muscle. XR KNEE RIGHT (1-2 VIEWS)   Final Result   Mild improved angulation of the comminuted displaced fracture of the distal   femoral metadiaphysis. XR HIP 2-3 VW W PELVIS RIGHT   Final Result      Osteoarthritic change of the right hip. No evidence of fracture or   malalignment. XR KNEE RIGHT (1-2 VIEWS)   Final Result      Comminuted fracture of the distal femoral shaft above a uninvolved total knee   arthroplasty. The fracture is displaced and angulated anteriorly. ASSESSMENT/PLAN:     Closed fracture of right distal femur on x-ray. comminuted fracture of the distal femur  Reduced in ED. Splint in place  Pain control  Had Right femur ORIF with lateral locking plate and IMN 0/13       Hyponatremia  Na 126-->133-->129-->137 after fluid restricition  DC HCTZ   Hold off iv fluids  Fluid restrict to 2000ml/d  Monitor Na qd  Appreciate nephrology advice     HTN  Cont 10mg po qd Lisinopril  IV Labetalol PRN     HLD  Continue home lovastatin (sub w/ atorvastatin)    Anxiety  PRN Diazepam  Consult to psych to advice    New problems; Anemia, Hb 6.6 from 8.6  Getting 1U PRBCs  Check Colton/Fol/B12/TIBC/Hapto/LDH/retics        DVT prophylaxis: Lovenox  Probiotic if on abx: N/A     Diet: ADULT DIET; Regular  Diet NPO Exceptions are: Sips of Water with Meds  Code Status: Full Code     Consults:  IP CONSULT TO ORTHOPEDIC SURGERY     Disposition: Scores 17/24, OK for DC to SNF/Home with help.      ELOS: Greater than two midnights due to medical therapy     Jaz Reyes MD

## 2022-09-26 NOTE — DISCHARGE INSTR - COC
Continuity of Care Form    Patient Name: Alondra Pavon   :  1950  MRN:  [id number]    Admit date:  2022  Discharge date:  2022    Code Status Order: Full Code   Advance Directives:     Admitting Physician:  Safia Rivera MD  PCP: Ginny Junior MD    Discharging Nurse: [facility]/Room#: 7FO-0203/8473-72  Discharging Unit Phone Number: ***    Emergency Contact:   Extended Emergency Contact Information  Primary Emergency Contact: Mikie Alonso Phone: [phone]  Relation: Child    Past Surgical History:  Past Surgical History:   Procedure Laterality Date    FEMUR FRACTURE SURGERY Right 2022    OPEN REDUCTION INTERNAL FIXATION OF RIGHT DISTAL FEMUR performed by Richar Maldonado MD at [facility] 2 times.      TOTAL KNEE ARTHROPLASTY Right 2020    RIGHT ROBOTIC ASSISTED TOTAL KNEE ARTHROPLASTY - JEFFERS & NEPHEW ADVANCED performed by Ronnie Thompson MD at [facility]         Immunization History:   Immunization History   Administered Date(s) Administered    COVID-19, MODERNA BLUE border, Primary or Immunocompromised, (age 12y+), IM, 100 mcg/0.5mL 2021, 2021    COVID-19, MODERNA Booster BLUE border, (age 18y+), IM, 50mcg/0.25mL 11/15/2021    Influenza Virus Vaccine 10/01/2015    Influenza, FLUAD, (age 72 y+), Adjuvanted, 0.5mL 10/19/2020, 10/11/2021    Influenza, High Dose (Fluzone 65 yrs and older) 2016, 2017, 2018    Influenza, Triv, inactivated, subunit, adjuvanted, IM (Fluad 65 yrs and older) 2019    Pneumococcal Conjugate 13-valent (Vcflroq14) 2017    Pneumococcal Polysaccharide (Edffmbzln99) 2018    Zoster Recombinant (Shingrix) 2019, 2019       Active Problems:  Patient Active Problem List   Diagnosis Code    Hyperlipidemia E78.5    Essential hypertension, benign I10    20 RIGHT TKA M17.11    Wears hearing aid in right ear Z97.4    Closed fracture of distal end of right femur, unspecified fracture morphology[facility] S72.401A    Anxiety disorder F41.9       Isolation/Infection:   Isolation            No Isolation          Patient Infection Status       None to display            Nurse Assessment:  Last Vital Signs: BP (!) 160/70   Pulse 82   Temp 97.8 °F (36.6 °C) (Oral)   Resp 16   Ht 5' 8\" (1.727 m)   Wt 140 lb (63.5 kg)   LMP 02/14/1992   SpO2 96%   BMI 21.29 kg/m²     Last documented pain score (0-10 scale): Pain Level: 1  Last Weight:   Wt Readings from Last 1 Encounters:   09/20/22 140 lb (63.5 kg)     Mental Status:  oriented and alert    IV Access:  - None    Nursing Mobility/ADLs:  Walking   Assisted  Transfer  Assisted  Bathing  Assisted  Dressing  Assisted  Toileting  Assisted  Feeding  Assisted  Med Admin  Assisted  Med Delivery   whole    Wound Care Documentation and Therapy:  Incision 09/21/22 Femoral Anterior;Right (Active)   Dressing Status Clean;Dry; Intact 09/26/22 0900   Dressing/Treatment Dry dressing; Ace wrap 09/26/22 0900   Closure Other (Comment) 09/26/22 0900   Drainage Amount None 09/26/22 0900   Odor None 09/26/22 0900   Lesvia-incision Assessment Intact 09/26/22 0900   Number of days: 4        Elimination:  Continence: Bowel: Yes  Bladder: Yes  Urinary Catheter: None   Colostomy/Ileostomy/Ileal Conduit: No       Date of Last BM: 9/25/2022      Intake/Output Summary (Last 24 hours) at 9/26/2022 1152  Last data filed at 9/25/2022 2043  Gross per 24 hour   Intake 537.5 ml   Output 680 ml   Net -142.5 ml     I/O last 3 completed shifts:   In: 537.5 [Blood:537.5]  Out: 1380 [Urine:1380]    Safety Concerns:     History of Falls (last 30 days) and At Risk for Falls    Impairments/Disabilities:      None    Nutrition Therapy:  Current Nutrition Therapy:   - Oral Diet:  General    Routes of Feeding: Oral  Liquids: No Restrictions  Daily Fluid Restriction: no  Last Modified Barium Swallow with Video (Video Swallowing Test): not done    Treatments at the Time of Hospital Discharge:   Respiratory Treatments: ***  Oxygen Therapy:  is not on home oxygen therapy. Ventilator:    - No ventilator support    Rehab Therapies: Physical Therapy and Occupational Therapy  Weight Bearing Status/Restrictions: weight bearing as tolerated  Other Medical Equipment (for information only, NOT a DME order):  walker  Other Treatments: ***    Patient's personal belongings (please select all that are sent with patient):  Glasses, Hearing Aides {LEFT/RIGHT/BILATERAL:5526399000}    RN SIGNATURE:  Electronically signed by Sarath Cazares RN on 9/26/22 at 2:47 PM EDT    CASE MANAGEMENT/SOCIAL WORK SECTION    Inpatient Status Date: ***    Readmission Risk Assessment Score:  Readmission Risk              Risk of Unplanned Readmission:  10           Discharging to Facility/ [address] [place] [address]  Phone: [phone]  Fax: [phone]         / signature: Electronically signed by Elizabeth Farah RN on 9/26/22 at 2:52 PM EDT    PHYSICIAN SECTION    Prognosis: {Prognosis:0224366823}    Condition at Discharge: [facility] Patient Condition:674548884}    Rehab Potential (if transferring to Rehab): {Prognosis:6213135206}    Recommended Labs or Other Treatments After Discharge: ***    Physician Certification: I certify the above information and transfer of Sunil Natarajan  is necessary for the continuing treatment of the diagnosis listed and that she requires {Admit to Appropriate Level of Care:18762} for {GREATER/LESS:577056877} 30 days.      Update Admission H&P: {CHP DME Changes in PDTKL:987692692}    PHYSICIAN SIGNATURE:  {Esignature:115765871}

## 2022-09-26 NOTE — DISCHARGE SUMMARY
Hospital Medicine Discharge Summary    Patient: Paulino Fuchs     Gender: female  : 1950   Age: 71 y.o. MRN: [id number]    Admitting Physician: Randall Doll MD  Discharge Physician: Kassie Tenorio MD     Code Status: Full Code     Admit Date: 2022   Discharge Date:   22    Disposition:  SNF    Discharge Diagnoses:      Closed fracture of right distal femur on x-ray. comminuted fracture of the distal femur  Reduced in ED. Splint in place  Pain control  Had Right femur ORIF with lateral locking plate and IMN         Hyponatremia  Na 126-->133-->129-->137 after fluid restricition  DC HCTZ   Hold off iv fluids  Fluid restrict to 2000ml/d  Monitor Na qd  Appreciate nephrology advice     HTN  Cont 10mg po qd Lisinopril  IV Labetalol PRN     HLD  Continue home lovastatin (sub w/ atorvastatin)     Anxiety  PRN Diazepam  Consult to psych to advice     New problems; Anemia, Hb 6.6 from 8.6  Getting 1U PRBCs  Check Colton/Fol/B12/TIBC/Hapto/LDH/retics  Follow-up appointments:  as arranged with the patient    Outpatient to do list: as above    Condition at Discharge:  Stable    Hospital Course: Paulino Fuchs is a 71 y.o. female who presents to ED for evaluation of right knee pain s/p mechanical fall- slipped on the second to last step while going down the stairs and landed with her right leg underneath and behind her. No dizziness prior to fall. No fever, chest pain, shortness of breath, cough, abdominal pain, nausea, vomiting, diarrhea, constipation, urinary symptoms. X-ray shower a displaced comminuted fracture of the distal femur. Fracture was reduced in ED and splint was placed and had Right femur ORIF with lateral locking plate and IMN 3/47. She had hyponatremia sec to HCTZ and excessive water intake that corrected with HCTZ cessation and fluid retsriction.  He r hb dropped to 6.6 and she was transfused 1U PRBCs and was Dcd with hb 8.1    Discharge Medications:   Current Discharge Carbonate-Vitamin D (CALCIUM + D PO) Take 1 tablet by mouth daily       Glucosamine-Chondroit-Vit C-Mn (GLUCOSAMINE CHONDR 1500 COMPLX) CAPS Take 1 tablet by mouth daily       Multiple Vitamins-Minerals (ONE-A-DAY WOMENS 50 PLUS PO) Take 1 tablet by mouth daily. Loratadine (CLARITIN PO) Take 1 tablet by mouth daily. Cranberry 1000 MG CAPS Take 1 tablet by mouth daily       niacin 250 MG tablet Take 250 mg by mouth daily (with breakfast). Polyethylene Glycol 3350 (MIRALAX PO) Take by mouth daily            Current Discharge Medication List          Discharge ROS:  A complete review of systems was asked and negative except for above     Discharge Exam:    BP (!) 160/70   Pulse 82   Temp 97.8 °F (36.6 °C) (Oral)   Resp 16   Ht 5' 8\" (1.727 m)   Wt 140 lb (63.5 kg)   LMP 02/14/1992   SpO2 96%   BMI 21.29 kg/m²     General appearance:  Awake, alert, no apparent distress  HEENT:  Normocephalic, atraumatic without obvious deformity. PERRL. EOM intact. Conjunctivae/corneas clear. Neck: Supple, with full range of motion. No JVD. Trachea midline. Respiratory:  Clear to auscultation bilaterally without rales, wheezes, or rhonchi. Normal respiratory effort. Cardiovascular:  Regular rate and rhythm without murmurs, rubs or gallops. Abdomen: Soft, NT, ND, without rebound or guarding. Normal bowel sounds. Extremities:  Right knee immobilizer in place. No clubbing, cyanosis, or edema bilaterally. +2 palpable pulses, equal bilaterally. Capillary refill brisk,< 3 seconds   Skin: No rashes or lesions. Warm/dry. Neurologic:  Neurovascularly intact without any focal sensory/motor deficits. Cranial nerves: II-XII intact, grossly non-focal. Alert and oriented x 3. Normal speech. Psychiatric:  Thought content appropriate, normal insight    Labs:  For convenience and continuity at follow-up the following most recent labs are provided:    Lab Results   Component Value Date/Time    WBC 4.1 09/26/2022 07:42 AM HGB 8.1 09/26/2022 07:42 AM    HCT 24.2 09/26/2022 07:42 AM    MCV 91.6 09/26/2022 07:42 AM     09/26/2022 07:42 AM     09/26/2022 07:42 AM    K 3.7 09/26/2022 07:42 AM    K 3.6 09/22/2022 05:23 AM     09/26/2022 07:42 AM    CO2 25 09/26/2022 07:42 AM    BUN 14 09/26/2022 07:42 AM    CREATININE <0.5 09/26/2022 07:42 AM    CALCIUM 8.7 09/26/2022 07:42 AM    PHOS 4.2 08/24/2022 08:29 AM    ALKPHOS 41 09/25/2022 04:25 AM    ALT 15 09/25/2022 04:25 AM    AST 19 09/25/2022 04:25 AM    BILITOT 0.9 09/25/2022 04:25 AM    LABALBU 2.7 09/25/2022 04:25 AM    LDLCALC 86 08/24/2022 08:29 AM    TRIG 81 08/24/2022 08:29 AM     Lab Results   Component Value Date    INR 1.11 09/21/2022    INR 1.02 01/17/2020       Radiology:  XR FEMUR RIGHT (MIN 2 VIEWS)    Result Date: 9/21/2022  EXAMINATION: AP VIEWS OF THE RIGHT FEMUR 9/21/2022 8:07 pm COMPARISON: 09/19/2022 HISTORY: Postoperative. FINDINGS: An intramedullary nail has been placed across the periprosthetic distal femoral fracture. Lateral sideplate and screws have also been placed. Improved alignment of the fracture fragments. Routine perioperative changes noted including soft tissue swelling, subcutaneous air, and skin staples. Postoperative changes of the right femur. XR FEMUR RIGHT (MIN 2 VIEWS)    Result Date: 9/21/2022  Radiology exam is complete. No Radiologist dictation. Please follow up with ordering provider. XR KNEE RIGHT (1-2 VIEWS)    Result Date: 9/19/2022  EXAMINATION: 2 XRAY VIEWS OF THE RIGHT KNEE 9/19/2022 10:44 pm COMPARISON: 09/19/2022 HISTORY: ORDERING SYSTEM PROVIDED HISTORY: distal femur fracture, post-reduction TECHNOLOGIST PROVIDED HISTORY: Reason for exam:->distal femur fracture, post-reduction Reason for Exam: POST REDUCTION RT KNEE FINDINGS: Again identified is a comminuted fracture of the distal femoral shaft.   There is a total knee arthroplasty noted as well, with a moderate joint effusion with a fat fluid level noted.  Mild decreased angulation at the fracture site compared to the previous examination. Mild improved angulation of the comminuted displaced fracture of the distal femoral metadiaphysis. XR KNEE RIGHT (1-2 VIEWS)    Result Date: 9/19/2022  EXAM: XR Right Knee, 1 or 2 Views EXAM DATE/TIME: 9/19/2022 7:46 pm CLINICAL HISTORY: ORDERING SYSTEM PROVIDED  dislocation/fracture  TECHNOLOGIST PROVIDED HISTORY:  Reason for exam:->dislocation/fracture  Reason for Exam: fall, ? dislocation, fx TECHNIQUE: Frontal and/or lateral views of the right knee. COMPARISON: 09/17/2021 FINDINGS: Bones/joints:  Comminuted fracture of the distal femoral shaft above a uninvolved total knee arthroplasty. The fracture is displaced and angulated anteriorly. No dislocation. Soft tissues:  No acute findings. Comminuted fracture of the distal femoral shaft above a uninvolved total knee arthroplasty. The fracture is displaced and angulated anteriorly. CT FEMUR RIGHT WO CONTRAST    Result Date: 9/22/2022  EXAMINATION: CT OF THE RIGHT FEMUR WITHOUT CONTRAST 9/19/2022 11:02 pm TECHNIQUE: CT of the right femur was performed without the administration of intravenous contrast.  Multiplanar reformatted images are provided for review. Automated exposure control, iterative reconstruction, and/or weight based adjustment of the mA/kV was utilized to reduce the radiation dose to as low as reasonably achievable. COMPARISON: None HISTORY ORDERING SYSTEM PROVIDED HISTORY: fracture TECHNOLOGIST PROVIDED HISTORY: Reason for exam:->fracture Reason for Exam: Fracture. Knee Injury (Total knee replacement knee landed underneath her as she fell down two stairs. Knee visibly deformed. Pt reports not hitting her head. Pt reports very little pain with no movement, excruciating pain with movement. 10/10 ).  FINDINGS: Bones: Comminuted acute traumatic fracture is present of the distal right femur with periprosthetic fracture extension into the base of femoral component of the total knee arthroplasty with extension through the lateral femoral condyle. The proximal and mid femur are intact. Soft Tissue: Hemorrhage and edema are present in the vastus intermedius associated with the comminuted fracture in the distal femur. Joint: Large lipohemarthrosis. Status post total knee arthroplasty. Component alignment is maintained. Severe right hip degenerative changes with bone-on-bone. 1. Comminuted periprosthetic distal femur fracture, with extension through the lateral femoral condyle. Associated lipohemarthrosis with additional hemorrhage and edema in the vastus intermedius muscle. XR HIP 2-3 VW W PELVIS RIGHT    Result Date: 9/19/2022  EXAM: XR Right Hip With Pelvis When Performed, 2 or 3 Views EXAM DATE/TIME: 9/19/2022 7:46 pm CLINICAL HISTORY: ORDERING SYSTEM PROVIDED  injury  TECHNOLOGIST PROVIDED HISTORY:  Reason for exam:->injury  Reason for Exam: fall, ? dislocation, fx femur TECHNIQUE: Two or three views of the right hip with pelvis when performed. COMPARISON: No relevant prior studies available. FINDINGS: Bones/joints:  Osteoarthritic change of the right hip. No acute fracture. No dislocation. Soft tissues:  Surgical clips noted within the left groin. Osteoarthritic change of the right hip. No evidence of fracture or malalignment. EKG     Rhythm: normal sinus   Rate: normal  Clinical Impression: no acute changes        The patient was seen and examined on day of discharge and this discharge summary is in conjunction with any daily progress note from day of discharge. Time Spent on discharge is 30 minutes  in the examination, evaluation, counseling and review of medications and discharge plan.       Note that more than 30 minutes was spent in preparing discharge papers, discussing discharge with patient, medication review, etc.       Signed:    Tona Pallas, MD   9/26/2022      Thank you Thuan Roth MD for the opportunity to be involved in this patient's care.  If you have any questions or concerns please feel free to contact me at [facility]

## 2022-09-26 NOTE — PROGRESS NOTES
[facility]   Nephrology progress Note  [phone] [address] [address]. [facility]           Reason for Consult:  hyponatremia     HISTORY OF PRESENT ILLNESS:      The patient is a 71 y.o. female with significant past medical history of hypertension, hyperlipidemia who presents with fall (9/19/2022) She underwent open reduction internal fixation (9/21/2022). She is noted to be hyponatremic post procedure and therefore nephrology is consulted. She came in with Na 126 >> which improved to 133 and post op now decreased to 129 this AM. She has been on IVF since 9/20 and sodium has downtrended since then. She has been on lisinopril/HCTZ prior to hospitalization and also reports drinking >125 oz of water per day. Says she likes it. Per chart review, she appears to have a history of chronic hyponatremia as well likely from hctz use and high free water intake. Today upon my evaluation, she is tolerating diet, continues to drink a lot of water per preference. She still has pain at op site that is not completely controlled. She denies nausea, headaches, dizziness, chest pain or shortness of breath. Subjective:  -pt seen and examined  -PMSHx and meds reviewed  -No family at bedside    Up in chair  Pain is controlled    ROS: neg chest pain/SOB/fever/chills      Current Medications:     lisinopril  20 mg Oral Daily    atorvastatin  10 mg Oral Daily    vitamin B-12  500 mcg Oral Daily    sodium chloride flush  10 mL IntraVENous 2 times per day    [Held by provider] enoxaparin  40 mg SubCUTAneous Nightly    acetaminophen  1,000 mg Oral TID    polyethylene glycol  17 g Oral Daily        PHYSICAL EXAM:    Vitals:    BP (!) 157/77   Pulse 82   Temp 98.1 °F (36.7 °C) (Oral)   Resp 16   Ht 5' 8\" (1.727 m)   Wt 140 lb (63.5 kg)   LMP 02/14/1992   SpO2 98%   BMI 21.29 kg/m²   I/O last 3 completed shifts:   In: 537.5 [Blood:537.5]  Out: 1380 [Urine:1380]  No intake/output data recorded. Physical Exam:  Gen:  alert, oriented x 3  Pallor +, anicteric  Neck: supple  CV: RRR no murmur or rub . Lungs:B/ L air entry, Normal breath sounds   Abd: soft, NTND  Ext: No LLE, RLE in brace  Skin: Warm. No rash  Neuro: nonfocal.      DATA:    CBC:   Lab Results   Component Value Date/Time    WBC 4.1 09/26/2022 07:42 AM    RBC 2.64 09/26/2022 07:42 AM    HGB 8.1 09/26/2022 07:42 AM    HCT 24.2 09/26/2022 07:42 AM    MCV 91.6 09/26/2022 07:42 AM    MCH 30.8 09/26/2022 07:42 AM    MCHC 33.6 09/26/2022 07:42 AM    RDW 13.9 09/26/2022 07:42 AM     09/26/2022 07:42 AM    MPV 7.7 09/26/2022 07:42 AM     BMP:    Lab Results   Component Value Date/Time     09/25/2022 12:43 PM    K 3.9 09/25/2022 04:25 AM    K 3.6 09/22/2022 05:23 AM     09/25/2022 04:25 AM    CO2 24 09/25/2022 04:25 AM    BUN 14 09/25/2022 04:25 AM    LABALBU 2.7 09/25/2022 04:25 AM    CREATININE <0.5 09/25/2022 04:25 AM    CALCIUM 8.4 09/25/2022 04:25 AM    GFRAA >60 09/25/2022 04:25 AM    GFRAA >60 02/19/2013 06:21 AM    LABGLOM >60 09/25/2022 04:25 AM    GLUCOSE 101 09/25/2022 04:25 AM     Magnesium:  No results found for: MG  Phosphorus:    Lab Results   Component Value Date/Time    PHOS 4.2 08/24/2022 08:29 AM     U/A:    Lab Results   Component Value Date/Time    COLORU YELLOW 01/17/2020 01:53 PM    PROTEINU Negative 01/17/2020 01:53 PM    PHUR 8.0 01/17/2020 01:53 PM    CLARITYU Clear 01/17/2020 01:53 PM    SPECGRAV 1.010 01/17/2020 01:53 PM    LEUKOCYTESUR Negative 01/17/2020 01:53 PM    UROBILINOGEN 0.2 01/17/2020 01:53 PM    BILIRUBINUR Negative 01/17/2020 01:53 PM    BLOODU Negative 01/17/2020 01:53 PM    GLUCOSEU Negative 01/17/2020 01:53 PM           IMPRESSION/RECOMMENDATIONS:      Hyponatremia: euvolemic -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  On lisinopirl, add

## 2022-09-26 NOTE — CARE COORDINATION
Discharge note:      CM/SW has been notified of discharge. Patient noted to have the following needs at discharge. CM/SW has coordinated the following services:    [facility] [address] [facility]   Phone: [phone]  Fax: [phone]         Transportation: [facility] pickup between 5-5:30 pm    RN to call report to [phone]  CM to fax LARS/AVS when complete to [phone]      Pt daughter Noemi Pendleton updated with time of transport  Becky in admissions at [facility] updated with transport time  HENS complete      Comment:  CM presented patient with  IMM (Important Message from [facility]) letter prior to discharge (w/in 48 hrs of dc). CM explained the right to appeal discharge and the process to follow. All questions answered. Copy of IMM provided to pt and pt signed CM copy. Date and time of signed IMM documented and placed on soft chart. All CM/SW needs met, will sign off.      Electronically signed by Stella Vargas RN on 9/26/2022 at 2:51 PM

## 2022-09-26 NOTE — PROGRESS NOTES
Physical Therapy    [facility] [address]   Phone: [phone]    Physical Therapy    [] Initial Evaluation            [x] Daily Treatment Note         [] Discharge Summary      Patient: Malika Robins   : 1950   MRN: [id number]   Date of Service:  2022  Admitting Diagnosis: Closed fracture of distal end of right femur, unspecified fracture morphology, initial encounter [facility])  Current Admission Summary: 72 yo female admitted to [facility] on . Presented with leg pain following a fall. Periprosthetic fracture of the lower extremity diagnosed. Underwent open reduction internal fixation on  by Dr. Camacho Devries. Past Medical History:  has a past medical history of Arthritis, Hyperlipidemia, and Hypertension. Past Surgical History:  has a past surgical history that includes Middle ear surgery; Varicose vein surgery; Total knee arthroplasty (Right, 2020); and Femur fracture surgery (Right, 2022). Discharge Recommendations: Malika Robins scored a 17/24 on the AM-PAC short mobility form. Current research shows that an AM-PAC score of 17 or less is typically not associated with a discharge to the patient's home setting. Based on the patient's AM-PAC score and their current functional mobility deficits, it is recommended that the patient have 5-7 sessions per week of Physical Therapy at d/c to increase the patient's independence. At this time, this patient demonstrates complex nursing, medical, and rehabilitative needs, and would benefit from intensive rehabilitation services upon discharge from the Inpatient setting. This patient demonstrates the ability to participate in and benefit from an intensive therapy program with a coordinated interdisciplinary team approach to foster frequent, structured, and documented communication among disciplines, who will work together to establish, prioritize, and achieve treatment goals.  Please see assessment section for assistance  Toilet transfer: contact guard assistance  Comments: Cues for hand placement and keeping R LE extended in front for sit<>stand to compensate for KI brace. Pt performed her own hygiene. Ambulation  Surface:level surface  Assistive Device: rolling walker  Assistance: contact guard assistance  Distance: 12ft x 2+ 60 ft  Gait Mechanics: step-to gait pattern, very slow daina. Comments:   Pt amb to bathroom to toilet, sat to void urine. Stood at sink for handwashing. Pt amb back to chair and assisted donning R shoe/sock, pt able to do her own L side. Pt then amb in hallway. Pt needing much additional time to complete tasks. Stair Mobility  Stair mobility not completed on this date. Comments: Unable, has quad level at home  Wheelchair Mobility:  No w/c mobility completed on this date. Comments:  Balance  Static Sitting Balance: good: independent with functional balance in unsupported position  Dynamic Sitting Balance: good: independent with functional balance in unsupported position  Static Standing Balance: fair (-): maintains balance at CGA with use of UE support  Dynamic Standing Balance: fair (-): maintains balance at CGA with use of UE support  Comments: Indep-Mod I for sitting balance with pericare on toilet. CGA for standing balance at sink without UE support to wash hands x30 sec. RW used for all other standing tasks. Other Therapeutic Interventions  Toileting,pt performing her own hygiene. Dressing shoes/socks, standing ADLs at sink. Seated exercises: glut sets/quad sets 5 sec holdx10 reps (B), ankle pumps x10 reps (B)  Pt brace straps adjusted to fit more snuggly. Medial hinge lock secured on the 0 deg setting. Functional Outcomes  AM-PAC Inpatient Mobility Raw Score : 17              Cognition  WFL  Comment: Pt is a bit anxious but appears much calmer today per staff.   Orientation:    alert and oriented x 4  Command Following:   [facility]    Education  Barriers To Learning: emotional  Patient Education: patient educated on goals, PT role and benefits, plan of care, weight-bearing education, HEP, general safety, functional mobility training, transfer training, discharge recommendations  Learning Assessment:  patient verbalizes understanding, would benefit from continued reinforcement    Assessment  Activity Tolerance: Pt with increased pain with (R) LE movement but she tolerated the session well. She was fatigued following ambulation and had less tolerance to seated exercises with (R) LE vs (L). Impairments Requiring Therapeutic Intervention: decreased functional mobility, decreased ROM, decreased strength, decreased safety awareness, decreased endurance, decreased balance, increased pain  Prognosis: good  Clinical Assessment: Pt needing CGA for transfers and gait with RW. Assist needed for dressing R LE. Able to increase amb distance but very slow daina. Pt presents with less anxiety today. The patient demonstrates good balance with use of RW for ADL tasks in the bathroom. CGA provided throughout session. Pt would continue to benefit from skilled PT in order to safely return to PLOF. Safety Interventions: patient left in chair, chair alarm in place, call light within reach, gait belt, patient at risk for falls, and nurse notified    Plan  Frequency: 7 x/week  Current Treatment Recommendations: strengthening, balance training, functional mobility training, transfer training, gait training, stair training, endurance training, and pain management    Goals  Patient Goals: none stated    Short Term Goals:  Time Frame: upon discharge   Patient will complete bed mobility at Independent   Patient will complete transfers at stand by assistance   Patient will ambulate 15 ft with use of LRAD at modified independent  Patient will ascend/descend 1 stairs without use of HR at contact guard assistance  **No goals met in full this date, 9/26.     Therapy Session Time      Individual Group Co-treatment   Time In 4747       Time Out 1314       Minutes 41         Treatment Minutes:  41 Minutes    Electronically Signed By: Radha Joy, [id number]

## 2022-09-26 NOTE — PROGRESS NOTES
Shift assessment completed. Neuro WNL. Denies any pain at this time. AM meds administered per [facility] by student nurse and instructor. The care plan and education has been reviewed and mutually agreed upon with the patient. Standard safety measures in place. 3:24 PM  Called report at this time. Talked to Lita. All questions answered. Callback number given if there are further questions. 5:17 PM  IV removed prior dc. The care plan and education has been resolved and completed. Discharge instructions and medications reviewed with patient. Patient voices understanding and denies further concerns at this time. Patient discharged to SNF per orders with all personal belongings. Patient is transported at this time to facility by [address] [facility].

## 2022-09-26 NOTE — PROGRESS NOTES
[facility] Orthopedic Surgery   Progress Note    CHIEF COMPLAINT/DIAGNOSIS: S/p RIGHT distal femur periprosthetic fx ORIF     SUBJECTIVE: The patient is sitting up in the chair. Denies new issues. Anxiety seems to be improved. Awaiting insurance approval.     OBJECTIVE  Physical    VITALS:  BP (!) 160/70   Pulse 82   Temp 97.8 °F (36.6 °C) (Oral)   Resp 16   Ht 5' 8\" (1.727 m)   Wt 140 lb (63.5 kg)   LMP 02/14/1992   SpO2 96%   BMI 21.29 kg/m²     GENERAL: Alert and oriented x3, in no acute distress. MUSCULOSKELETAL: Intact DF/PF in operative leg. INCISION:  Covered with post-op dressing and ACE; clean, dry and intact. Swelling as expected; compartments remain easily compressible and reasonably supple. ROM: To operative knee deferred. Sensory:  Intact to light touch in tibial and peroneal distributions. Vascular:   2+ DP pulse with brisk cap refill. Data    ALL MEDICATIONS HAVE BEEN REVIEWED    CBC:   Recent Labs     09/25/22  0425 09/25/22  1243 09/25/22  1547 09/26/22  0742   WBC 4.2  --   --  4.1   HGB 6.6*  --  8.1* 8.1*   HCT 19.7* 24.1* 24.1* 24.2*     --   --  336     BMP:   Recent Labs     09/25/22  0425 09/25/22  1243 09/26/22  0742     139 137 140   K 3.9  --  3.7     --  106   CO2 24  --  25   BUN 14  --  14   CREATININE <0.5*  --  <0.5*     INR:   No results for input(s): INR in the last 72 hours. Post-op films show stable total knee arthroplasty construct with IM nail and lateral locking plate and screws in good position without acute complication. ASSESSMENT:  S/p ORIF RIGHT distal femur periprosthetic fx (9/21/22), POD#2  Anxiety  Acute post-op blood loss anemia as expected  Hyponatremia  HTN  HLD    PLAN:   - WB status:  OK for weight bearing as tolerated through operative leg; NO ROM x 2 weeks or until repeat x rays at earliest; Reviewed post op precautions.  - DVT prophylaxis: Lovenox held  - PT/OT  - Pain Control: Current regimen.  Scheduled tylenol and toradol with oxy prn (rx in chart) Due to orthopaedic surgical procedure/condition, patient may require pain medication for up to 6-8 weeks. - Expected post op acute blood loss anemia: H/H today: 8.1/24.2 (s/p 1 unit PRBC on 9/21 and another on 9/25). - Dispo: therapy rec ARU; ok to d/c from our end once dispo needs met/medically stable. Follow-up with Dr. Samantha Urbina in 2 weeks. Office # [phone]  No future appointments.     ERIN Stallings - CNP  9/26/2022  1:05 PM

## 2022-09-26 NOTE — PLAN OF CARE
Problem: Discharge Planning  Goal: Discharge to home or other facility with appropriate resources  9/25/2022 2049 by Hilario Walker RN  Outcome: Progressing  9/25/2022 1145 by Lino Stanley RN  Outcome: Progressing     Problem: Pain  Goal: Verbalizes/displays adequate comfort level or baseline comfort level  9/25/2022 2049 by Hilario Walker RN  Outcome: Progressing  9/25/2022 1145 by Lino Stanley RN  Outcome: Progressing     Problem: Safety - Adult  Goal: Free from fall injury  9/25/2022 2049 by Hilario Walker RN  Outcome: Progressing  9/25/2022 1145 by Lino Stanley RN  Outcome: Progressing     Problem: Skin/Tissue Integrity - Adult  Goal: Skin integrity remains intact  9/25/2022 2049 by Hilario Walker RN  Outcome: Progressing  9/25/2022 1145 by Lino Stanley RN  Outcome: Progressing  Goal: Incisions, wounds, or drain sites healing without S/S of infection  9/25/2022 2049 by Hilario Walker RN  Outcome: Progressing  9/25/2022 1145 by Lino Stanley RN  Outcome: Progressing     Problem: Musculoskeletal - Adult  Goal: Return mobility to safest level of function  9/25/2022 2049 by Hilario Walker RN  Outcome: Progressing  9/25/2022 1145 by Lino Stanley RN  Outcome: Progressing  Goal: Maintain proper alignment of affected body part  9/25/2022 2049 by Hilario Walker RN  Outcome: Progressing  9/25/2022 1145 by Lino Stanley RN  Outcome: Progressing  Goal: Return ADL status to a safe level of function  9/25/2022 2049 by Hilario Walker RN  Outcome: Progressing  9/25/2022 1145 by Lino Stanley RN  Outcome: Progressing     Problem: Genitourinary - Adult  Goal: Absence of urinary retention  9/25/2022 2049 by Hilario Walker RN  Outcome: Progressing  9/25/2022 1145 by Lino Stanley RN  Outcome: Progressing  Goal: Urinary catheter remains patent  9/25/2022 2049 by Hilario Walker RN  Outcome: Progressing  9/25/2022 1145 by Lino Stanley RN  Outcome: Progressing     Problem: Skin/Tissue Integrity  Goal: Absence of new skin breakdown  Description: 1. Monitor for areas of redness and/or skin breakdown  2. Assess vascular access sites hourly  3. Every 4-6 hours minimum:  Change oxygen saturation probe site  4. Every 4-6 hours:  If on nasal continuous positive airway pressure, respiratory therapy assess nares and determine need for appliance change or resting period.   9/25/2022 2049 by Ivonne Castillo RN  Outcome: Progressing  9/25/2022 1145 by Jame Busch RN  Outcome: Progressing

## 2022-09-29 ENCOUNTER — TELEPHONE (OUTPATIENT)
Dept: ORTHOPEDIC SURGERY | Age: 72
End: 2022-09-29

## 2022-09-29 NOTE — TELEPHONE ENCOUNTER
Spoke with Jay Watson at facility stating that the Pts appt on 10/3/22 is too early.  Jay Watson agreed to change her appt to Friday 10/7/22 at 10:30 am in [facility]

## 2022-10-07 ENCOUNTER — OFFICE VISIT (OUTPATIENT)
Dept: ORTHOPEDIC SURGERY | Age: 72
End: 2022-10-07

## 2022-10-07 ENCOUNTER — TELEPHONE (OUTPATIENT)
Dept: ORTHOPEDIC SURGERY | Age: 72
End: 2022-10-07

## 2022-10-07 VITALS — WEIGHT: 140 LBS | BODY MASS INDEX: 21.22 KG/M2 | HEIGHT: 68 IN

## 2022-10-07 DIAGNOSIS — Z87.81 STATUS POST CLOSED FRACTURE OF RIGHT FEMUR: Primary | ICD-10-CM

## 2022-10-07 PROCEDURE — 99024 POSTOP FOLLOW-UP VISIT: CPT | Performed by: ORTHOPAEDIC SURGERY

## 2022-10-07 RX ORDER — ENOXAPARIN SODIUM 100 MG/ML
40 INJECTION SUBCUTANEOUS NIGHTLY
Qty: 26 EACH | Refills: 0 | Status: SHIPPED | OUTPATIENT
Start: 2022-10-07 | End: 2022-11-02

## 2022-10-07 RX ORDER — OXYCODONE HYDROCHLORIDE 5 MG/1
5 TABLET ORAL EVERY 8 HOURS PRN
Qty: 21 TABLET | Refills: 0 | Status: SHIPPED | OUTPATIENT
Start: 2022-10-07 | End: 2022-10-14

## 2022-10-07 NOTE — PROGRESS NOTES
Patient: Natalie White                  : 1950   MRN: [id number]   Date of Visit: 10/7/22     Physician: Padmini Potts MD.     Reason for Visit: Status post right distal femur open reduction internal fixation    Subjective History of Present Illness: Natalie White is here for regularly scheduled follow-up s/p right distal femur open reduction internal fixation. She is doing well pain is relatively well controlled she still having a lot of swelling in the knee and discomfort with weightbearing. Physical Examination??: ?   General: Patient is alert and oriented x 3 and appears comfortable. Well-healed anterior and lateral incisions no evidence of erythema or drainage. Sensation is intact to light touch there is some lateral numbness over the knee. Radiographs: Multiple views of the  right femur including AP lateral and oblique there is a lateral femoral plate as well as a intramedullary nail there is comminution in the metadiaphyseal region overall alignment is intact. Assessment and Plan?: The patient is progressing well approximately 2 weeks s/p ORIF of the right distal femur     1. A thorough discussion was had with the patient concerning the ?postoperative course and the patient is in agreement with the plan. 2.  At this point we will continue to continue her in the brace unlocked. She can continue to be weightbearing as tolerated in the brace. 3. I did explain to her that challenges with this fracture are potential shortening of the extremity. Lastly we discussed that hardware particularly the intramedullary nail may have to be removed at some point.   I explained that we would get a better sense of her knee stability going forward as the fracture starts to heal.    _______________________      Padmini Potts MD

## 2022-10-07 NOTE — TELEPHONE ENCOUNTER
Returned Pts call about the pt's blood thinner medication. I spoke with the daughter who stated the pt is leaving the facility right now and was unable to get her blood thinner filled as they would have to order it to come to the pt. They are asking if Dr. Opal Eduardo can just sent the prescription to the Pt's Pharmacy in her chart.     I spoke with Dr. Opal Eduardo who stated he will order her the meds to be sent to her pharmacy    I let the daughter know that Dr. Opal Eduardo will put that in for the Pt

## 2022-10-10 ENCOUNTER — CARE COORDINATION (OUTPATIENT)
Dept: CASE MANAGEMENT | Age: 72
End: 2022-10-10

## 2022-10-10 DIAGNOSIS — S72.401A CLOSED FRACTURE OF DISTAL END OF RIGHT FEMUR, UNSPECIFIED FRACTURE MORPHOLOGY, INITIAL ENCOUNTER (HCC): Primary | ICD-10-CM

## 2022-10-10 PROCEDURE — 1111F DSCHRG MED/CURRENT MED MERGE: CPT | Performed by: INTERNAL MEDICINE

## 2022-10-10 NOTE — CARE COORDINATION
[facility] Discharge Call    10/10/2022    Patient: Roland Wu Patient : 1950   MRN: [id number]  Reason for Admission: R femur fx  Discharge Date: 22 RARS: Readmission Risk Score: 12.8         Discharge Facility: [address] [place] [address] Course:   Pt to [place] from  to  with a R femur fx after a fall. Pt with a hx of a R TKA. She had a ORIF on . Also with hyponatremia and anemia. Pt then to [facility] for 11 days with a d/c home with [address] made:  10/17/22 HFU   ortho    Sig Hx:   Anxiety, HTN, HLD, hearing aid R ear    DME: walker    Conversation:   Spoke with pt after 2 IDs. Ayde Frazier is feeling really good. She is staying with her sons currently for a little extra care. She is walking with her walker and is progressing very well. This past Friday she was a t ortho appt and reviewed AVS. She is weaning off her oxycodone and only needs one right before bed. Does not know yet about PT - if needed. If more pain meds are needed she uses Tylenol arthritis Talked to her about Tylenol limits. She also uses lovenox and her daughter is a nurse and no issues with the shot. She will need to change PCP appt as her daughter will come with her. She will arrange this. Reviewed meds. She does take Miralax daily to stay regular. She is waking up a couple of times a night and is able to get back to sleep easily. Her appetite is great. Agreeable to phone calls. Follow up plan:   Will hand off to [address] Transition      Do you have any ongoing symptoms?: No   Do you have all of your prescriptions and are they filled?: Yes   Do you have any questions related to your medications?: No   Were you discharged with any Home Care or Post Acute Services or do you currently have any active services?: Yes   Post Acute Services: Home Health (Comment: [facility])         Care Transitions Interventions         Future Appointments   Date Time Provider Laura Zambrano   10/17/2022 10:40 AM ERIN Kahn - CNP [facility]   2022  9:45 AM Daria Hernandez MD  Ortho [facility]     Transitions of Care Initial Call    Was this an external facility discharge? Yes, 10/7/22  Discharge Facility: [address] [place] [address] to be reviewed by the provider   Additional needs identified to be addressed with provider: No  none             Method of communication with provider : none    Advance Care Planning:   Does patient have an Advance Directive: reviewed and current. Care Transition Nurse contacted the patient by telephone to perform post hospital discharge assessment. Verified name and  with patient as identifiers. Provided introduction to self, and explanation of the CTN role. CTN reviewed discharge instructions, medical action plan and red flags with patient who verbalized understanding. Patient given an opportunity to ask questions and does not have any further questions or concerns at this time. Were discharge instructions available to patient? Yes. Reviewed appropriate site of care based on symptoms and resources available to patient including: PCP. The patient agrees to contact the PCP office for questions related to their healthcare. Medication reconciliation was performed with patient, who verbalizes understanding of administration of home medications. Advised obtaining a 90-day supply of all daily and as-needed medications. Was patient discharged with a pulse oximeter? no    CTN provided contact information. Plan for follow-up call in 5-7 days based on severity of symptoms and risk factors.   Plan for next call: referral to ambulatory care Hiren Case "Nayana" 103, BSN, RN   [address] [place] [address] Transition Nurse  [phone]

## 2022-10-11 ENCOUNTER — TELEPHONE (OUTPATIENT)
Dept: ORTHOPEDIC SURGERY | Age: 72
End: 2022-10-11

## 2022-10-11 NOTE — TELEPHONE ENCOUNTER
[address] [place] Name: [facility]   Contact Name: [address]  Number: [phone]  Request or Information: PATIENT HAS STARTED PHYSICAL THERAPY TODAY 10/11. PATIENT WILL BE SEEN 2X A WEEK FOR 6 WEEKS. PHYSICAL THERAPY IS NEEDING TO KNOW IF THE PATIENT SHOULD HAVE BRACE ON 24/7 WHILE WALKING AND DURING PT. PT IS ALSO NEEDING TO KNOW IF THE PATIENT AS ANY FLEX RESTRICTIONS. PLEASE ADVISE.

## 2022-10-13 NOTE — TELEPHONE ENCOUNTER
Spoke with Lee Arguelles, I told her that unfortunately Dr. Brandie Ng is out of the office this week but I will forward this message to him.  Dr. Brandie Ng will be back in office on Monday

## 2022-10-17 ENCOUNTER — OFFICE VISIT (OUTPATIENT)
Dept: INTERNAL MEDICINE CLINIC | Age: 72
End: 2022-10-17
Payer: MEDICARE

## 2022-10-17 VITALS
SYSTOLIC BLOOD PRESSURE: 138 MMHG | DIASTOLIC BLOOD PRESSURE: 64 MMHG | OXYGEN SATURATION: 98 % | WEIGHT: 138 LBS | HEART RATE: 82 BPM | BODY MASS INDEX: 20.98 KG/M2

## 2022-10-17 DIAGNOSIS — F41.1 GENERALIZED ANXIETY DISORDER: ICD-10-CM

## 2022-10-17 DIAGNOSIS — I10 ESSENTIAL HYPERTENSION, BENIGN: ICD-10-CM

## 2022-10-17 DIAGNOSIS — N95.9 MENOPAUSAL PROBLEM: ICD-10-CM

## 2022-10-17 DIAGNOSIS — Z09 HOSPITAL DISCHARGE FOLLOW-UP: ICD-10-CM

## 2022-10-17 DIAGNOSIS — D50.0 IRON DEFICIENCY ANEMIA DUE TO CHRONIC BLOOD LOSS: ICD-10-CM

## 2022-10-17 DIAGNOSIS — Z09 HOSPITAL DISCHARGE FOLLOW-UP: Primary | ICD-10-CM

## 2022-10-17 DIAGNOSIS — E87.1 HYPONATREMIA: ICD-10-CM

## 2022-10-17 DIAGNOSIS — S72.401A CLOSED FRACTURE OF DISTAL END OF RIGHT FEMUR, UNSPECIFIED FRACTURE MORPHOLOGY, INITIAL ENCOUNTER (HCC): ICD-10-CM

## 2022-10-17 LAB
ALBUMIN SERPL-MCNC: 4.5 G/DL (ref 3.4–5)
ANION GAP SERPL CALCULATED.3IONS-SCNC: 10 MMOL/L (ref 3–16)
BUN BLDV-MCNC: 20 MG/DL (ref 7–20)
CALCIUM SERPL-MCNC: 10.1 MG/DL (ref 8.3–10.6)
CHLORIDE BLD-SCNC: 100 MMOL/L (ref 99–110)
CO2: 24 MMOL/L (ref 21–32)
CREAT SERPL-MCNC: 0.5 MG/DL (ref 0.6–1.2)
GFR SERPL CREATININE-BSD FRML MDRD: >60 ML/MIN/{1.73_M2}
GLUCOSE BLD-MCNC: 107 MG/DL (ref 70–99)
HCT VFR BLD CALC: 33.3 % (ref 36–48)
HEMOGLOBIN: 10.9 G/DL (ref 12–16)
MCH RBC QN AUTO: 30.6 PG (ref 26–34)
MCHC RBC AUTO-ENTMCNC: 32.9 G/DL (ref 31–36)
MCV RBC AUTO: 93.2 FL (ref 80–100)
PDW BLD-RTO: 14.1 % (ref 12.4–15.4)
PHOSPHORUS: 3.9 MG/DL (ref 2.5–4.9)
PLATELET # BLD: 435 K/UL (ref 135–450)
PMV BLD AUTO: 8.8 FL (ref 5–10.5)
POTASSIUM SERPL-SCNC: 4.7 MMOL/L (ref 3.5–5.1)
RBC # BLD: 3.57 M/UL (ref 4–5.2)
SODIUM BLD-SCNC: 134 MMOL/L (ref 136–145)
WBC # BLD: 4.8 K/UL (ref 4–11)

## 2022-10-17 PROCEDURE — 99214 OFFICE O/P EST MOD 30 MIN: CPT | Performed by: NURSE PRACTITIONER

## 2022-10-17 PROCEDURE — 1123F ACP DISCUSS/DSCN MKR DOCD: CPT | Performed by: NURSE PRACTITIONER

## 2022-10-17 PROCEDURE — 1111F DSCHRG MED/CURRENT MED MERGE: CPT | Performed by: NURSE PRACTITIONER

## 2022-10-17 RX ORDER — BUSPIRONE HYDROCHLORIDE 5 MG/1
5 TABLET ORAL 2 TIMES DAILY
COMMUNITY
Start: 2022-10-06 | End: 2022-10-17 | Stop reason: SDUPTHER

## 2022-10-17 RX ORDER — CARVEDILOL 6.25 MG/1
6.25 TABLET ORAL 2 TIMES DAILY
Qty: 180 TABLET | Refills: 1 | Status: SHIPPED | OUTPATIENT
Start: 2022-10-17

## 2022-10-17 RX ORDER — BUSPIRONE HYDROCHLORIDE 5 MG/1
5 TABLET ORAL 2 TIMES DAILY
Qty: 180 TABLET | Refills: 1 | Status: SHIPPED | OUTPATIENT
Start: 2022-10-17

## 2022-10-17 RX ORDER — CARVEDILOL 6.25 MG/1
6.25 TABLET ORAL 2 TIMES DAILY
COMMUNITY
Start: 2022-10-06 | End: 2022-10-17 | Stop reason: SDUPTHER

## 2022-10-17 RX ORDER — AMLODIPINE BESYLATE 5 MG/1
5 TABLET ORAL 2 TIMES DAILY
Qty: 90 TABLET | Refills: 1 | Status: SHIPPED | OUTPATIENT
Start: 2022-10-17 | End: 2022-10-18 | Stop reason: SDUPTHER

## 2022-10-17 RX ORDER — LISINOPRIL 20 MG/1
20 TABLET ORAL DAILY
Qty: 90 TABLET | Refills: 1 | Status: SHIPPED | OUTPATIENT
Start: 2022-10-17

## 2022-10-17 SDOH — ECONOMIC STABILITY: FOOD INSECURITY: WITHIN THE PAST 12 MONTHS, YOU WORRIED THAT YOUR FOOD WOULD RUN OUT BEFORE YOU GOT MONEY TO BUY MORE.: NEVER TRUE

## 2022-10-17 SDOH — ECONOMIC STABILITY: FOOD INSECURITY: WITHIN THE PAST 12 MONTHS, THE FOOD YOU BOUGHT JUST DIDN'T LAST AND YOU DIDN'T HAVE MONEY TO GET MORE.: NEVER TRUE

## 2022-10-17 ASSESSMENT — SOCIAL DETERMINANTS OF HEALTH (SDOH): HOW HARD IS IT FOR YOU TO PAY FOR THE VERY BASICS LIKE FOOD, HOUSING, MEDICAL CARE, AND HEATING?: NOT HARD AT ALL

## 2022-10-17 NOTE — PROGRESS NOTES
Post-Discharge Transitional Care  Follow Up      Roland Wu   YOB: 1950    Date of Office Visit:  10/17/2022  Date of Hospital Admission: 9/19/22  Date of Hospital Discharge: 9/26/22  Risk of hospital readmission (high >=14%. Medium >=10%) :Readmission Risk Score: 12.8    Care management risk score Rising risk (score 2-5) and Complex Care (Scores >=6): No Risk Score On File     Non face to face  following discharge, date last encounter closed (first attempt may have been earlier): *No documented post hospital discharge outreach found in the last 14 days    Call initiated 2 business days of discharge: *No response recorded in the last 14 days    ASSESSMENT/PLAN:   Hospital discharge follow-up  -     MD DISCHARGE MEDS RECONCILED W/ CURRENT OUTPATIENT MED LIST  -     Renal Function Panel; Future  -     CBC; Future  - admission notes, imaging and work up reviewed with pt in detail   Closed fracture of distal end of right femur, unspecified fracture morphology, initial encounter (HCC)/ Menopausal problem  Chronic,improving   Reviewed ortho notes and reviewed admission in detail   Discussed DEXA with pt given fracture - dexa ordered   -     DEXA BONE DENSITY AXIAL SKELETON; Future  Essential hypertension, benign/ hyponatremia   Chronic, stable, controlled. Continue to stay off HCTZ. Repeating renal function today. -     amLODIPine (NORVASC) 5 MG tablet; Take 1 tablet by mouth in the morning and at bedtime, Disp-90 tablet, R-1Normal  -     lisinopril (PRINIVIL;ZESTRIL) 20 MG tablet; Take 1 tablet by mouth daily, Disp-90 tablet, R-1Normal  -     carvedilol (COREG) 6.25 MG tablet; Take 1 tablet by mouth in the morning and at bedtime, Disp-180 tablet, R-1Normal  Generalized anxiety disorder  -     busPIRone (BUSPAR) 5 MG tablet;  Take 1 tablet by mouth in the morning and at bedtime, Disp-180 tablet, R-1Normal  Iron deficiency anemia due to chronic blood loss  Chronic, improving after surgery and discharge Repeating cbc today. -     CBC; Future  Hyponatremia  -     Renal Function Panel; Future    Medical Decision Making: moderate complexity    Return in about 3 months (around 1/17/2023), or if symptoms worsen or fail to improve. Subjective:   HPI:  Follow up of Hospital problems/diagnosis(es):     Here for HFU, admitted 9/19-9/26  Discharged to [facility] on 10/7/2022, staying between 2 sons houses  Has Wilfredo 79 - PT with [facility]     She had a fall - Multilevel house and fell on the steps/ new carpet   Had a closed fracture of right distal femur on xray, right femur ORIF with lateral locking plate and IMN on 6/89  She is on lovenox shots for DVT prevention. Hyponatremia - improved off IVF and with fluid restriction. She is off HCTZ. HTN - has been running 120s/ ? At home. Anemia required PRBC during admission. Still having some fatigue. Anxiety - doing well on buspar   Glucose was elevated, reports A1c complete at [facility]. Inpatient course: Discharge summary reviewed- see chart. Interval history/Current status: stable     Patient Active Problem List   Diagnosis    Hyperlipidemia    Essential hypertension, benign    1/28/20 RIGHT TKA    Wears hearing aid in right ear    Closed fracture of distal end of right femur, unspecified fracture morphology, initial encounter ([facility] [address]    Anxiety disorder     Medications listed as ordered at the time of discharge from hospital     Medication List            Accurate as of October 17, 2022  5:12 PM. If you have any questions, ask your nurse or doctor.                 CONTINUE taking these medications      acetaminophen 500 MG tablet  Commonly known as: TYLENOL  Take 2 tablets by mouth in the morning, at noon, and at bedtime for 14 days     amLODIPine 5 MG tablet  Commonly known as: NORVASC  Take 1 tablet by mouth in the morning and at bedtime     busPIRone 5 MG tablet  Commonly known as: BUSPAR  Take 1 tablet by mouth in the morning and at bedtime     CALCIUM + D PO     carvedilol 6.25 MG tablet  Commonly known as: COREG  Take 1 tablet by mouth in the morning and at bedtime     CLARITIN PO     Cranberry 1000 MG Caps     enoxaparin 40 MG/0.4ML  Commonly known as: LOVENOX  Inject 0.4 mLs into the skin at bedtime for 26 days     Glucosamine Chondr 1500 Complx Caps     ibuprofen 600 MG tablet  Commonly known as: ADVIL;MOTRIN     lisinopril 20 MG tablet  Commonly known as: PRINIVIL;ZESTRIL  Take 1 tablet by mouth daily     lovastatin 20 MG tablet  Commonly known as: MEVACOR  Take 1 tablet by mouth nightly     MIRALAX PO     niacin 250 MG tablet     ONE-A-DAY WOMENS 50 PLUS PO     vitamin B-12 500 MCG tablet  Commonly known as: CYANOCOBALAMIN            STOP taking these medications      ondansetron 4 MG disintegrating tablet  Commonly known as: ZOFRAN-ODT  Stopped by: ERIN Wu CNP     tiZANidine 4 MG tablet  Commonly known as: Bernis Slicker by: ERIN Wu CNP               Where to Get Your Medications        These medications were sent to [address] [place], [place] [address]n [facility] [address]      Phone: [phone]   amLODIPine 5 MG tablet  busPIRone 5 MG tablet  carvedilol 6.25 MG tablet  lisinopril 20 MG tablet         Medications marked \"taking\" at this time  Outpatient Medications Marked as Taking for the 10/17/22 encounter (Office Visit) with ERIN Omalley CNP   Medication Sig Dispense Refill    busPIRone (BUSPAR) 5 MG tablet Take 1 tablet by mouth in the morning and at bedtime 180 tablet 1    amLODIPine (NORVASC) 5 MG tablet Take 1 tablet by mouth in the morning and at bedtime 90 tablet 1    lisinopril (PRINIVIL;ZESTRIL) 20 MG tablet Take 1 tablet by mouth daily 90 tablet 1    carvedilol (COREG) 6.25 MG tablet Take 1 tablet by mouth in the morning and at bedtime 180 tablet 1    enoxaparin (LOVENOX) 40 MG/0.4ML Inject 0.4 mLs into the skin at bedtime for 26 days 26 each 0    acetaminophen (TYLENOL) 500 MG tablet Take 2 tablets by mouth in the morning, at noon, and at bedtime for 14 days 84 tablet 0    lovastatin (MEVACOR) 20 MG tablet Take 1 tablet by mouth nightly 90 tablet 3    vitamin B-12 (CYANOCOBALAMIN) 500 MCG tablet Take 500 mcg by mouth daily      Calcium Carbonate-Vitamin D (CALCIUM + D PO) Take 1 tablet by mouth daily       Glucosamine-Chondroit-Vit C-Mn (GLUCOSAMINE CHONDR 1500 COMPLX) CAPS Take 1 tablet by mouth daily       Multiple Vitamins-Minerals (ONE-A-DAY WOMENS 50 PLUS PO) Take 1 tablet by mouth daily. Loratadine (CLARITIN PO) Take 1 tablet by mouth daily. Cranberry 1000 MG CAPS Take 1 tablet by mouth daily       niacin 250 MG tablet Take 250 mg by mouth daily (with breakfast). Polyethylene Glycol 3350 (MIRALAX PO) Take by mouth daily         Medications patient taking as of now reconciled against medications ordered at time of hospital discharge: Yes        Objective:    /64   Pulse 82   Wt 138 lb (62.6 kg)   LMP 02/14/1992   SpO2 98%   BMI 20.98 kg/m²     Physical Exam  Constitutional:       General: She is not in acute distress. Appearance: Normal appearance. She is not ill-appearing. HENT:      Head: Normocephalic and atraumatic. Pulmonary:      Effort: Pulmonary effort is normal. No respiratory distress. Musculoskeletal:      Comments: Right leg with brace on. Gait steady with walker. Neurological:      Mental Status: She is alert and oriented to person, place, and time. Mental status is at baseline. Psychiatric:         Mood and Affect: Mood normal.         Behavior: Behavior normal.     An electronic signature was used to authenticate this note.   --Elias Collins, APRN - CNP

## 2022-10-17 NOTE — TELEPHONE ENCOUNTER
Spoke with Ivelisse Zhu from PT, I left her know the following from Dr. Bard Mcnamara. She understood and all questions were answered     Yes, she should have the brace on at all times when walking, standing. She is weight bearing as tolerated. There are no flexion restrictions.  I want her to work on ROM ( range of motion)

## 2022-10-18 DIAGNOSIS — I10 ESSENTIAL HYPERTENSION, BENIGN: ICD-10-CM

## 2022-10-18 RX ORDER — AMLODIPINE BESYLATE 5 MG/1
5 TABLET ORAL 2 TIMES DAILY
Qty: 180 TABLET | Refills: 1 | Status: SHIPPED | OUTPATIENT
Start: 2022-10-18

## 2022-10-18 NOTE — TELEPHONE ENCOUNTER
----- Message from Eyal Saldana sent at 10/18/2022  3:28 PM EDT -----  Subject: Message to Provider    QUESTIONS  Information for Provider? Ernesto Salinas with [address] on [place] [address]   is calling to clarify dosage for this pt. Should amlodipine 5 mg be once   per day or twice per day? If it is twice per day, for a 90 day supply she   would need 180 tablets. Please call [phone] to clarify, and can press   0 to speak to a person. ---------------------------------------------------------------------------  --------------  [facility]  [phone]; Do not leave any message, patient will call back for answer  ---------------------------------------------------------------------------  --------------  SCRIPT ANSWERS  Relationship to Patient? Third Party  Third Party Type? Pharmacy? Representative Name?  Ernesto Salinas

## 2022-10-19 ENCOUNTER — TELEPHONE (OUTPATIENT)
Dept: INTERNAL MEDICINE CLINIC | Age: 72
End: 2022-10-19

## 2022-10-19 NOTE — TELEPHONE ENCOUNTER
----- Message from Elianepolly Deb sent at 10/19/2022 12:56 PM EDT -----  Subject: Message to Provider    QUESTIONS  Information for Provider? Demetris Covington from [place] will be faxing over a   request for a bone density scan due to Pt's fracture. Please contact   Demetris Covington back at # below  ---------------------------------------------------------------------------  --------------  Doron Howell INFO  [phone]; OK to leave message on voicemail  ---------------------------------------------------------------------------  --------------  SCRIPT ANSWERS  Relationship to Patient? Third Party  Third Party Type? Insurance? Representative Name?  Tj Alarcon
A dexa scan was ordered on 10/17
Left UE AROM all joints WFL. Right UE AROM for shoulder, elbow WFL. Pt with no AROM for right wrist, gross grasp or digit extension

## 2022-11-04 ENCOUNTER — OFFICE VISIT (OUTPATIENT)
Dept: ORTHOPEDIC SURGERY | Age: 72
End: 2022-11-04

## 2022-11-04 VITALS — WEIGHT: 138 LBS | BODY MASS INDEX: 20.92 KG/M2 | HEIGHT: 68 IN

## 2022-11-04 DIAGNOSIS — Z87.81 STATUS POST CLOSED FRACTURE OF RIGHT FEMUR: Primary | ICD-10-CM

## 2022-11-04 PROCEDURE — 99024 POSTOP FOLLOW-UP VISIT: CPT | Performed by: ORTHOPAEDIC SURGERY

## 2022-11-04 NOTE — LETTER
[facility]  [address]  [address]  Phone: [phone]  Fax: [phone]    Ricky Modi MD        November 4, 2022     Patient: Natalie White   YOB: 1950   Date of Visit: 11/4/2022       To Whom It May Concern: It is my medical opinion that Natalie White that Ms Tosha Medrano can transition to a cane. She will continue with the brace and ROM exercises. If you have any questions or concerns, please don't hesitate to call.     Sincerely,        Ricky Modi MD

## 2022-11-04 NOTE — PROGRESS NOTES
Patient: Renaldo Pereira                  : 1950   MRN: [id number]   Date of Visit: 22     Physician: Kelli Harris MD.     Reason for Visit: Status post right distal femur open reduction internal fixation    Subjective History of Present Illness: Renaldo Pereira is here for regularly scheduled follow-up s/p right distal femur open reduction internal fixation. She is doing well pain is relatively well controlled she still having a lot of swelling in the knee and discomfort with weightbearing. Clicking sensation in the knee when going from flexion to extension. Physical Examination??: ?   General: Patient is alert and oriented x 3 and appears comfortable. Well-healed anterior and lateral incisions no evidence of erythema or drainage. Sensation is intact to light touch there is some lateral numbness over the knee. I am unable to recreate the clicking sensation however there somemid flexion laxity with a firm endpoint and slight  hyperextension. Radiographs: Multiple views of the  right femur including AP lateral and oblique there is a lateral femoral plate as well as a intramedullary nail there is comminution in the metadiaphyseal region overall alignment is intact. There is bridging bone at this point. Assessment and Plan?: The patient is progressing well approximately 6 weeks s/p ORIF of the right distal femur     1. A thorough discussion was had with the patient concerning the ?postoperative course and the patient is in agreement with the plan. 2.  At this point we will continue to continue her in the brace unlocked. She can continue to be weightbearing as tolerated in the brace. 3. I did explain to her that challenges with this fracture are potential shortening of the extremity. Lastly we discussed that hardware particularly the intramedullary nail may have to be removed at some point.   I explained that we would get a better sense of her knee stability going forward as the fracture starts to heal.    _______________________      Rancho Betancur MD

## 2022-11-11 ENCOUNTER — TELEPHONE (OUTPATIENT)
Dept: PHARMACY | Facility: CLINIC | Age: 72
End: 2022-11-11

## 2022-11-11 NOTE — LETTER
[place]  [address] [facility], [address]   [address] [place] [address] [address] [address]           11/16/22     Dear Jc Garcia,    We tried to contact you at your number on file, however, we were unable to reach you. We were calling to discuss your bone health. Your doctor wants to measure your risk for any future fractures. Just a reminder - Dr. Willie Feng would like you to call and schedule a DEXA bone scan. This type of x-ray will help assess your risk for future fractures. A bone mineral density test (DEXA scan) is an outpatient radiological test.  It is not painful and it takes a short time to perform. We urge you to call as soon as possible to schedule this important test.  Please call [phone] (option 2, then option 1) to schedule your DEXA scan. If you have additional questions, please call us or your provider.     Thank you,    [address]  Phone: [phone], option 1

## 2022-11-11 NOTE — TELEPHONE ENCOUNTER
[facility] CLINICAL PHARMACY REVIEW: RECENT FRACTURE    Lissette Robbins is a 71 y.o. old female patient who recently had a fracture of right femur (slipped on step while going down stairs; 9/19/22 ED visit)    Labs:   Lab Results   Component Value Date    VITD25 55.0 01/17/2020      Lab Results   Component Value Date    CALCIUM 10.1 10/17/2022    PHOS 3.9 10/17/2022     estimated creatinine clearance is 102 mL/min (A) (based on SCr of 0.5 mg/dL (L)). DEXA 8/11/2015:  Osteopenia    FRAX-calculated 10-year fracture probability:   Per WHO calculator: major Osteoporotic = 24% and Hip = 8.6%    Assessment:   71 y.o. female with recent fracture and may benefit from updated DEXA to assess current BMD - ordered 10/17/22, not yet scheduled    Attempting to reach patient for review, left message.       Deshaun Miller, PharmD  [facility] AND [facility]  PGY-1 Pharmacy Resident  Department, toll free: [phone], option 1

## 2022-11-16 NOTE — TELEPHONE ENCOUNTER
Unable to contact patient on second attempt. Letter sent to patient.     Nelda Lawson PharmD  [facility]  PGY-1 Pharmacy Resident  Department, toll free: [phone], option 1    For [address] [facility] in place:  No  Gap Closed?: No   Time Spent (min): 30

## 2022-11-23 ENCOUNTER — TELEPHONE (OUTPATIENT)
Dept: ORTHOPEDIC SURGERY | Age: 72
End: 2022-11-23

## 2022-11-23 NOTE — TELEPHONE ENCOUNTER
[address] [place] Name: [facility] PT  Contact Name: CALLI  Contact Number: [phone]  Request or Information:       CALLI FROM [facility] PHYSICAL THERAPY CALLED TO LET DR Jackson Edmondson KNOW THAT THE PT [address] [facility] PT, AND WILL BEGIN OP THERAPY ON Monday. SHE SAID THAT SHE MET ALL OF HER GOALS AND DID WELL.   IF YOU NEED TO CALL THE THERAPIST BACK YOU CAN REACH HER [phone]

## 2022-11-29 ENCOUNTER — TELEPHONE (OUTPATIENT)
Dept: ORTHOPEDIC SURGERY | Age: 72
End: 2022-11-29

## 2022-11-29 NOTE — TELEPHONE ENCOUNTER
Spoke to Gordon about this Pt.  Gave them the following instructions from Emmit Severance, MD  to Me    ES    3:54 PM  Yes, want the patient to continue to wear the brace when mobile, can remove when resting, sleeping     Instructions were understood

## 2022-11-29 NOTE — TELEPHONE ENCOUNTER
Erin from [facility] therapy dept need to know if pt can take off brace during therapy and restrictions. Pt is telling them she cannot take the brace off per her understanding of Dr talavera.   Pls call [phone]

## 2022-12-19 ENCOUNTER — OFFICE VISIT (OUTPATIENT)
Dept: ORTHOPEDIC SURGERY | Age: 72
End: 2022-12-19

## 2022-12-19 VITALS — BODY MASS INDEX: 19.7 KG/M2 | HEIGHT: 68 IN | WEIGHT: 130 LBS

## 2022-12-19 DIAGNOSIS — M21.70 LEG LENGTH DISCREPANCY: ICD-10-CM

## 2022-12-19 DIAGNOSIS — Z87.81 STATUS POST CLOSED FRACTURE OF RIGHT FEMUR: Primary | ICD-10-CM

## 2022-12-19 PROCEDURE — 99024 POSTOP FOLLOW-UP VISIT: CPT | Performed by: ORTHOPAEDIC SURGERY

## 2022-12-20 NOTE — PROGRESS NOTES
Patient: Radha Farah                  : 1950   MRN: [id number]   Date of Visit: 22     Physician: Arabella Yeung MD.     Reason for Visit: Status post right distal femur open reduction internal fixation    Subjective History of Present Illness: Radha Farah is here for regularly scheduled follow-up s/p right distal femur open reduction internal fixation. She is doing well pain is relatively well controlled she still having a lot of swelling in the knee and discomfort with weightbearing. Clicking sensation in the knee when going from flexion to extension. Physical Examination??: ?   General: Patient is alert and oriented x 3 and appears comfortable. Well-healed anterior and lateral incisions no evidence of erythema or drainage. Sensation is intact to light touch there is some lateral numbness over the knee. I am unable to recreate the clicking sensation however there somemid flexion laxity with a firm endpoint and slight  hyperextension. Radiographs: Multiple views of the  right femur including AP lateral and oblique there is a lateral femoral plate as well as a intramedullary nail there is comminution in the metadiaphyseal region overall alignment is intact. There is bridging bone at this point. Assessment and Plan?: The patient is progressing well approximately 12 weeks s/p ORIF of the right distal femur     1. A thorough discussion was had with the patient concerning the ?postoperative course and the patient is in agreement with the plan. 2.  At this point we will continue to continue her in the brace unlocked. She can continue to be weightbearing as tolerated in the brace. 3. I did explain to her that challenges with this fracture are potential shortening of the extremity. We did provide her a prescription for a shoe lift on her right side.     Last I did discuss with her regarding the hip that we would have to remove the intramedullary nail and some hardware prior to the hip arthroplasty that would require going through the knee at that time if upsizing her polyethylene did provide her more stability we would do that at the same time.     _______________________      Mehnaz Howell MD

## 2023-01-18 ENCOUNTER — HOSPITAL ENCOUNTER (OUTPATIENT)
Dept: GENERAL RADIOLOGY | Age: 73
Discharge: HOME | End: 2023-01-18
Payer: MEDICARE

## 2023-01-18 DIAGNOSIS — S72.401A CLOSED FRACTURE OF DISTAL END OF RIGHT FEMUR, UNSPECIFIED FRACTURE MORPHOLOGY, INITIAL ENCOUNTER (HCC): ICD-10-CM

## 2023-01-18 DIAGNOSIS — N95.9 MENOPAUSAL PROBLEM: ICD-10-CM

## 2023-01-18 PROCEDURE — 77080 DXA BONE DENSITY AXIAL: CPT

## 2023-01-23 ENCOUNTER — OFFICE VISIT (OUTPATIENT)
Dept: INTERNAL MEDICINE CLINIC | Age: 73
End: 2023-01-23
Payer: MEDICARE

## 2023-01-23 VITALS
WEIGHT: 135 LBS | OXYGEN SATURATION: 100 % | SYSTOLIC BLOOD PRESSURE: 138 MMHG | HEART RATE: 76 BPM | DIASTOLIC BLOOD PRESSURE: 74 MMHG | BODY MASS INDEX: 20.53 KG/M2

## 2023-01-23 DIAGNOSIS — M81.0 AGE-RELATED OSTEOPOROSIS WITHOUT CURRENT PATHOLOGICAL FRACTURE: ICD-10-CM

## 2023-01-23 DIAGNOSIS — I10 ESSENTIAL HYPERTENSION, BENIGN: Primary | ICD-10-CM

## 2023-01-23 DIAGNOSIS — S72.401A CLOSED FRACTURE OF DISTAL END OF RIGHT FEMUR, UNSPECIFIED FRACTURE MORPHOLOGY, INITIAL ENCOUNTER (HCC): ICD-10-CM

## 2023-01-23 PROCEDURE — 99214 OFFICE O/P EST MOD 30 MIN: CPT | Performed by: NURSE PRACTITIONER

## 2023-01-23 PROCEDURE — 1123F ACP DISCUSS/DSCN MKR DOCD: CPT | Performed by: NURSE PRACTITIONER

## 2023-01-23 PROCEDURE — 96372 THER/PROPH/DIAG INJ SC/IM: CPT | Performed by: NURSE PRACTITIONER

## 2023-01-23 PROCEDURE — 3075F SYST BP GE 130 - 139MM HG: CPT | Performed by: NURSE PRACTITIONER

## 2023-01-23 PROCEDURE — 3078F DIAST BP <80 MM HG: CPT | Performed by: NURSE PRACTITIONER

## 2023-01-23 ASSESSMENT — PATIENT HEALTH QUESTIONNAIRE - PHQ9
2. FEELING DOWN, DEPRESSED OR HOPELESS: 0
SUM OF ALL RESPONSES TO PHQ QUESTIONS 1-9: 0
SUM OF ALL RESPONSES TO PHQ QUESTIONS 1-9: 0
1. LITTLE INTEREST OR PLEASURE IN DOING THINGS: 0
SUM OF ALL RESPONSES TO PHQ QUESTIONS 1-9: 0
SUM OF ALL RESPONSES TO PHQ9 QUESTIONS 1 & 2: 0
SUM OF ALL RESPONSES TO PHQ QUESTIONS 1-9: 0

## 2023-01-23 NOTE — PROGRESS NOTES
1/23/23     Chief Complaint   Patient presents with    Hypertension     F/u     Immunizations     Prolia today      HPI    Here for HTN follow up   Home BP is reported as well controlled  She would like to discuss prolia today prior to getting injection       Allergies   Allergen Reactions    Hydrochlorothiazide      hyponatremia    No Known Allergies        Current Outpatient Medications   Medication Sig Dispense Refill    MELATONIN PO Take by mouth      amLODIPine (NORVASC) 5 MG tablet Take 1 tablet by mouth in the morning and at bedtime 180 tablet 1    busPIRone (BUSPAR) 5 MG tablet Take 1 tablet by mouth in the morning and at bedtime 180 tablet 1    lisinopril (PRINIVIL;ZESTRIL) 20 MG tablet Take 1 tablet by mouth daily 90 tablet 1    carvedilol (COREG) 6.25 MG tablet Take 1 tablet by mouth in the morning and at bedtime 180 tablet 1    acetaminophen (TYLENOL) 500 MG tablet Take 2 tablets by mouth in the morning, at noon, and at bedtime for 14 days 84 tablet 0    lovastatin (MEVACOR) 20 MG tablet Take 1 tablet by mouth nightly 90 tablet 3    ibuprofen (ADVIL;MOTRIN) 600 MG tablet Take 600 mg by mouth daily as needed for Pain      vitamin B-12 (CYANOCOBALAMIN) 500 MCG tablet Take 500 mcg by mouth daily      Calcium Carbonate-Vitamin D (CALCIUM + D PO) Take 1 tablet by mouth daily       Glucosamine-Chondroit-Vit C-Mn (GLUCOSAMINE CHONDR 1500 COMPLX) CAPS Take 1 tablet by mouth daily       Multiple Vitamins-Minerals (ONE-A-DAY WOMENS 50 PLUS PO) Take 1 tablet by mouth daily. Loratadine (CLARITIN PO) Take 1 tablet by mouth daily. Cranberry 1000 MG CAPS Take 1 tablet by mouth daily       niacin 250 MG tablet Take 250 mg by mouth daily (with breakfast).       Polyethylene Glycol 3350 (MIRALAX PO) Take by mouth daily        Current Facility-Administered Medications   Medication Dose Route Frequency Provider Last Rate Last Admin    denosumab (PROLIA) SC injection 60 mg  60 mg SubCUTAneous Once Laura Matos, APRN - CNP         Review of Systems  Negative other than HPI     Vitals:    01/23/23 1048   BP: 138/74   Pulse: 76   SpO2: 100%   Weight: 135 lb (61.2 kg)      Physical Exam  Constitutional:       General: She is not in acute distress. Appearance: Normal appearance. She is not ill-appearing. HENT:      Head: Normocephalic and atraumatic. Cardiovascular:      Rate and Rhythm: Normal rate and regular rhythm. Pulmonary:      Effort: Pulmonary effort is normal. No respiratory distress. Breath sounds: Normal breath sounds. Neurological:      General: No focal deficit present. Mental Status: She is alert and oriented to person, place, and time. Mental status is at baseline. Psychiatric:         Mood and Affect: Mood normal.         Behavior: Behavior normal.     Assessment/Plan:  Essential hypertension, benign  Chronic, stable, controlled. -     amLODIPine (NORVASC) 5 MG tablet; Take 1 tablet by mouth in the morning and at bedtime, Disp-90 tablet, R-1Normal  -     lisinopril (PRINIVIL;ZESTRIL) 20 MG tablet; Take 1 tablet by mouth daily, Disp-90 tablet, R-1Normal  -     carvedilol (COREG) 6.25 MG tablet; Take 1 tablet by mouth in the morning and at bedtime, Disp-180 tablet, R-1Normal    Age-related osteoporosis without current pathological fracture/ Closed fracture of distal end of right femur, unspecified fracture morphology, initial encounter ([facility])  Chronic, uncontrolled. Reviewed medication use, risk and s/e in detail with pt . Written education provided. Prolia injection given today during OV. Continue to see ortho as scheduled. She has tried to get a shoe lift insert but was told  orthotics is not doing them   - denosumab (PROLIA) SC injection 60 mg    Discussed medications with patient, who voiced understanding of their use and indications. All questions answered. Return in about 6 months (around 7/23/2023), or if symptoms worsen or fail to improve, for prolia, HTN, HLD, anxiety . Electronically signed by ERIN De La Vega CNP on 1/23/2023 at 11:27 AM

## 2023-01-26 ENCOUNTER — PATIENT MESSAGE (OUTPATIENT)
Dept: ORTHOPEDIC SURGERY | Age: 73
End: 2023-01-26

## 2023-03-20 ENCOUNTER — OFFICE VISIT (OUTPATIENT)
Dept: ORTHOPEDIC SURGERY | Age: 73
End: 2023-03-20
Payer: MEDICARE

## 2023-03-20 VITALS — BODY MASS INDEX: 20.46 KG/M2 | HEIGHT: 68 IN | WEIGHT: 135 LBS

## 2023-03-20 DIAGNOSIS — Z87.81 STATUS POST CLOSED FRACTURE OF RIGHT FEMUR: ICD-10-CM

## 2023-03-20 DIAGNOSIS — Z96.659 PAIN IN KNEE REGION AFTER TOTAL KNEE REPLACEMENT, SUBSEQUENT ENCOUNTER: Primary | ICD-10-CM

## 2023-03-20 DIAGNOSIS — T84.84XD PAIN IN KNEE REGION AFTER TOTAL KNEE REPLACEMENT, SUBSEQUENT ENCOUNTER: Primary | ICD-10-CM

## 2023-03-20 PROCEDURE — L1812 KO ELASTIC W/JOINTS PRE OTS: HCPCS | Performed by: ORTHOPAEDIC SURGERY

## 2023-03-20 PROCEDURE — 99213 OFFICE O/P EST LOW 20 MIN: CPT | Performed by: ORTHOPAEDIC SURGERY

## 2023-03-20 PROCEDURE — 1123F ACP DISCUSS/DSCN MKR DOCD: CPT | Performed by: ORTHOPAEDIC SURGERY

## 2023-03-20 NOTE — PROGRESS NOTES
Patient: Dawn Cuba                  : 1950   MRN: [id number]   Date of Visit: 3/20/23     Physician: Charlene Antonio MD.     Reason for Visit: Status post right distal femur open reduction internal fixation    Subjective History of Present Illness: Dawn Cuba is here for regularly scheduled follow-up s/p right distal femur open reduction internal fixation. She is doing well pain is relatively well controlled she still having a lot of swelling in the knee and discomfort with weightbearing. Clicking sensation in the knee when going from flexion to extension. Physical Examination??: ?   General: Patient is alert and oriented x 3 and appears comfortable. Well-healed anterior and lateral incisions no evidence of erythema or drainage. Sensation is intact to light touch there is some lateral numbness over the knee. I am unable to recreate the clicking sensation however there somemid flexion laxity with a firm endpoint and slight  hyperextension. She does hyperextend 5 degrees and there is laxity in mid flexion and lift off and flexion. Radiographs: Multiple views of the  right femur including AP lateral and oblique there is a lateral femoral plate as well as a intramedullary nail there is comminution in the metadiaphyseal region overall alignment is intact. There is bridging bone at this point. Assessment and Plan?: The patient is progressing well approximately 6 months s/p ORIF of the right distal femur     1. A thorough discussion was had with the patient concerning the ?postoperative course and the patient is in agreement with the plan. 2.  I think she has some instability in the knee this is a combination of her initial knee replacement as well as the soft tissue changes that have happened with shortening of her right lower extremity.      3. I did discuss with her revision knee replacement and I do think we would have to remove the femoral nail as it was slightly prominent after

## 2023-04-27 DIAGNOSIS — I10 ESSENTIAL HYPERTENSION, BENIGN: ICD-10-CM

## 2023-04-27 DIAGNOSIS — F41.1 GENERALIZED ANXIETY DISORDER: ICD-10-CM

## 2023-04-27 RX ORDER — AMLODIPINE BESYLATE 5 MG/1
5 TABLET ORAL 2 TIMES DAILY
Qty: 180 TABLET | Refills: 1 | Status: SHIPPED | OUTPATIENT
Start: 2023-04-27

## 2023-04-27 RX ORDER — BUSPIRONE HYDROCHLORIDE 5 MG/1
5 TABLET ORAL 2 TIMES DAILY
Qty: 180 TABLET | Refills: 1 | Status: SHIPPED | OUTPATIENT
Start: 2023-04-27

## 2023-04-27 RX ORDER — CARVEDILOL 6.25 MG/1
6.25 TABLET ORAL 2 TIMES DAILY
Qty: 180 TABLET | Refills: 1 | Status: SHIPPED | OUTPATIENT
Start: 2023-04-27

## 2023-04-27 NOTE — TELEPHONE ENCOUNTER
Last OV: 1/23/2023  Next OV: 8/28/2023     Next appointment due:     Last fill:10/18/2022  Refills:1      Next appt with Jd Small but not NTP.  Not sure if we need to do NTP since she has been seeing Jd Small in the past.

## 2023-04-27 NOTE — TELEPHONE ENCOUNTER
Last OV: 1/23/2023  Next OV: 8/28/2023    Next appointment due:    Last fill:10/18/2022  Refills:1     Next appt with [facility] but not NTP.  Not sure if we need to do NTP since she has been seeing [facility] in the past.

## 2023-04-28 DIAGNOSIS — I10 ESSENTIAL HYPERTENSION, BENIGN: ICD-10-CM

## 2023-04-28 RX ORDER — LISINOPRIL 20 MG/1
20 TABLET ORAL DAILY
Qty: 90 TABLET | Refills: 1 | Status: SHIPPED | OUTPATIENT
Start: 2023-04-28

## 2023-04-28 NOTE — TELEPHONE ENCOUNTER
Pt calling requesting refill of lisinopril (PRINIVIL;ZESTRIL) 20 MG tablet    Last written 10/17/22  Last OV 1/23/23  Next OV 8/28/23    Please send to [facility] on [address]

## 2023-05-08 SDOH — ECONOMIC STABILITY: FOOD INSECURITY: WITHIN THE PAST 12 MONTHS, THE FOOD YOU BOUGHT JUST DIDN'T LAST AND YOU DIDN'T HAVE MONEY TO GET MORE.: NEVER TRUE

## 2023-05-08 SDOH — ECONOMIC STABILITY: INCOME INSECURITY: HOW HARD IS IT FOR YOU TO PAY FOR THE VERY BASICS LIKE FOOD, HOUSING, MEDICAL CARE, AND HEATING?: NOT HARD AT ALL

## 2023-05-08 SDOH — ECONOMIC STABILITY: TRANSPORTATION INSECURITY
IN THE PAST 12 MONTHS, HAS LACK OF TRANSPORTATION KEPT YOU FROM MEETINGS, WORK, OR FROM GETTING THINGS NEEDED FOR DAILY LIVING?: NO

## 2023-05-08 SDOH — ECONOMIC STABILITY: FOOD INSECURITY: WITHIN THE PAST 12 MONTHS, YOU WORRIED THAT YOUR FOOD WOULD RUN OUT BEFORE YOU GOT MONEY TO BUY MORE.: NEVER TRUE

## 2023-05-08 SDOH — ECONOMIC STABILITY: HOUSING INSECURITY
IN THE LAST 12 MONTHS, WAS THERE A TIME WHEN YOU DID NOT HAVE A STEADY PLACE TO SLEEP OR SLEPT IN A SHELTER (INCLUDING NOW)?: NO

## 2023-05-09 ENCOUNTER — OFFICE VISIT (OUTPATIENT)
Dept: INTERNAL MEDICINE CLINIC | Age: 73
End: 2023-05-09

## 2023-05-09 VITALS
WEIGHT: 136 LBS | SYSTOLIC BLOOD PRESSURE: 120 MMHG | HEIGHT: 68 IN | HEART RATE: 68 BPM | DIASTOLIC BLOOD PRESSURE: 82 MMHG | BODY MASS INDEX: 20.61 KG/M2

## 2023-05-09 DIAGNOSIS — F41.9 ANXIETY: Primary | ICD-10-CM

## 2023-05-09 LAB
ALBUMIN SERPL-MCNC: 4.6 G/DL (ref 3.4–5)
ANION GAP SERPL CALCULATED.3IONS-SCNC: 11 MMOL/L (ref 3–16)
BUN SERPL-MCNC: 17 MG/DL (ref 7–20)
CALCIUM SERPL-MCNC: 9.6 MG/DL (ref 8.3–10.6)
CHLORIDE SERPL-SCNC: 99 MMOL/L (ref 99–110)
CO2 SERPL-SCNC: 23 MMOL/L (ref 21–32)
CREAT SERPL-MCNC: 0.5 MG/DL (ref 0.6–1.2)
GFR SERPLBLD CREATININE-BSD FMLA CKD-EPI: >60 ML/MIN/{1.73_M2}
GLUCOSE SERPL-MCNC: 125 MG/DL (ref 70–99)
PHOSPHATE SERPL-MCNC: 3.2 MG/DL (ref 2.5–4.9)
POTASSIUM SERPL-SCNC: 4.8 MMOL/L (ref 3.5–5.1)
SODIUM SERPL-SCNC: 133 MMOL/L (ref 136–145)
TSH SERPL DL<=0.005 MIU/L-ACNC: 1.12 UIU/ML (ref 0.27–4.2)

## 2023-05-09 RX ORDER — SERTRALINE HYDROCHLORIDE 25 MG/1
25 TABLET, FILM COATED ORAL DAILY
Qty: 30 TABLET | Refills: 0 | Status: SHIPPED | OUTPATIENT
Start: 2023-05-09

## 2023-05-09 NOTE — PROGRESS NOTES
Chief Complaint   Patient presents with    Anxiety     Pt reports having concerns with Anxiety. 2 wks ago noticed an anxiety attack. Taking buspar as prescribed     HPI  Presenting with stress and anxiety for the past two weeks. She is not sure what triggered this. She has been taking Buspar 5mg BID since last September. Up until two weeks ago she felt like this was helpful. She is having trouble sleeping through the night. Her mood feels depressed. EXAM  /82   Pulse 68   Ht 5' 8\" (1.727 m)   Wt 136 lb (61.7 kg)   LMP 02/14/1992   BMI 20.68 kg/m²    GEN: WN/WD  NEURO: Alert, oriented, CN intact  Psych: affect is anxious    A/P  1. Anxiety  With active and uncontrolled symptoms. We reviewed potential risk and benefits of the addition of sertraline 25 mg daily. I also recommended behavioral therapy. She is agreeable to seeing Dr. Jevon Wick  She is agreeable to a trial of sertraline 25 mg daily. She will continue BuSpar 5 mg twice daily. A low-dose of both medications the risk of drug interaction is relatively low. She will return in 4 weeks to monitor response.   Labs will be obtained today  - TSH with Reflex to FT4  - Renal Function Panel

## 2023-05-11 ENCOUNTER — OFFICE VISIT (OUTPATIENT)
Dept: PSYCHOLOGY | Age: 73
End: 2023-05-11
Payer: MEDICARE

## 2023-05-11 DIAGNOSIS — F43.23 ADJUSTMENT DISORDER WITH MIXED ANXIETY AND DEPRESSED MOOD: Primary | ICD-10-CM

## 2023-05-11 PROCEDURE — 90791 PSYCH DIAGNOSTIC EVALUATION: CPT | Performed by: PSYCHOLOGIST

## 2023-05-11 PROCEDURE — 1123F ACP DISCUSS/DSCN MKR DOCD: CPT | Performed by: PSYCHOLOGIST

## 2023-05-11 ASSESSMENT — ANXIETY QUESTIONNAIRES
IF YOU CHECKED OFF ANY PROBLEMS ON THIS QUESTIONNAIRE, HOW DIFFICULT HAVE THESE PROBLEMS MADE IT FOR YOU TO DO YOUR WORK, TAKE CARE OF THINGS AT HOME, OR GET ALONG WITH OTHER PEOPLE: NOT DIFFICULT AT ALL
1. FEELING NERVOUS, ANXIOUS, OR ON EDGE: 3
5. BEING SO RESTLESS THAT IT IS HARD TO SIT STILL: 0
GAD7 TOTAL SCORE: 12
4. TROUBLE RELAXING: 3
7. FEELING AFRAID AS IF SOMETHING AWFUL MIGHT HAPPEN: 0
2. NOT BEING ABLE TO STOP OR CONTROL WORRYING: 3
6. BECOMING EASILY ANNOYED OR IRRITABLE: 0
3. WORRYING TOO MUCH ABOUT DIFFERENT THINGS: 3

## 2023-05-11 ASSESSMENT — PATIENT HEALTH QUESTIONNAIRE - PHQ9
3. TROUBLE FALLING OR STAYING ASLEEP: 3
10. IF YOU CHECKED OFF ANY PROBLEMS, HOW DIFFICULT HAVE THESE PROBLEMS MADE IT FOR YOU TO DO YOUR WORK, TAKE CARE OF THINGS AT HOME, OR GET ALONG WITH OTHER PEOPLE: 1
SUM OF ALL RESPONSES TO PHQ9 QUESTIONS 1 & 2: 4
7. TROUBLE CONCENTRATING ON THINGS, SUCH AS READING THE NEWSPAPER OR WATCHING TELEVISION: 0
6. FEELING BAD ABOUT YOURSELF - OR THAT YOU ARE A FAILURE OR HAVE LET YOURSELF OR YOUR FAMILY DOWN: 2
SUM OF ALL RESPONSES TO PHQ QUESTIONS 1-9: 10
8. MOVING OR SPEAKING SO SLOWLY THAT OTHER PEOPLE COULD HAVE NOTICED. OR THE OPPOSITE, BEING SO FIGETY OR RESTLESS THAT YOU HAVE BEEN MOVING AROUND A LOT MORE THAN USUAL: 0
4. FEELING TIRED OR HAVING LITTLE ENERGY: 0
SUM OF ALL RESPONSES TO PHQ QUESTIONS 1-9: 10
9. THOUGHTS THAT YOU WOULD BE BETTER OFF DEAD, OR OF HURTING YOURSELF: 0
5. POOR APPETITE OR OVEREATING: 1
2. FEELING DOWN, DEPRESSED OR HOPELESS: 3
SUM OF ALL RESPONSES TO PHQ QUESTIONS 1-9: 10
1. LITTLE INTEREST OR PLEASURE IN DOING THINGS: 1
SUM OF ALL RESPONSES TO PHQ QUESTIONS 1-9: 10

## 2023-05-17 ENCOUNTER — TELEPHONE (OUTPATIENT)
Dept: INTERNAL MEDICINE CLINIC | Age: 73
End: 2023-05-17

## 2023-05-18 ENCOUNTER — OFFICE VISIT (OUTPATIENT)
Dept: INTERNAL MEDICINE CLINIC | Age: 73
End: 2023-05-18

## 2023-05-18 VITALS
WEIGHT: 131 LBS | OXYGEN SATURATION: 98 % | DIASTOLIC BLOOD PRESSURE: 80 MMHG | BODY MASS INDEX: 19.92 KG/M2 | SYSTOLIC BLOOD PRESSURE: 136 MMHG | HEART RATE: 77 BPM

## 2023-05-18 DIAGNOSIS — G47.00 INSOMNIA, UNSPECIFIED TYPE: ICD-10-CM

## 2023-05-18 DIAGNOSIS — F41.1 GENERALIZED ANXIETY DISORDER: Primary | ICD-10-CM

## 2023-05-18 DIAGNOSIS — F41.9 ANXIETY: ICD-10-CM

## 2023-05-18 RX ORDER — BUSPIRONE HYDROCHLORIDE 5 MG/1
10 TABLET ORAL 2 TIMES DAILY
Qty: 360 TABLET | Refills: 0 | Status: SHIPPED
Start: 2023-05-18
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
## 2023-05-25 RX ORDER — TRAZODONE HYDROCHLORIDE 50 MG/1
50 TABLET ORAL NIGHTLY PRN
Qty: 30 TABLET | Refills: 0 | Status: ON HOLD | OUTPATIENT
Start: 2023-05-25

## 2023-05-25 NOTE — PROGRESS NOTES
On the phone with pt, who is anxious and tearful. She eluded to possible self harm with staff member. She reports she is feeling depressed and anxious, more than during her appt last week. She is alone 24 hr a day. She went to visit her son over the weekend and that did not help when it used to help. She is taking buspar. She is not sleeping well. She admitted to thoughts of self harm, denies any plan and repeatedly states that she has not plan and could not do it to her family. She is agreeable to increasing her sertraline to 50 mg daily. Rx for trazodone to start for sleep. Pt to come in for appt tomorrow at 920. Reviewed criteria to seek emergency medical attention.  Electronically signed by ERIN Perrin CNP on 5/25/2023 at 2:45 PM

## 2023-05-26 ENCOUNTER — OFFICE VISIT (OUTPATIENT)
Dept: INTERNAL MEDICINE CLINIC | Age: 73
End: 2023-05-26

## 2023-05-26 ENCOUNTER — HOSPITAL ENCOUNTER (INPATIENT)
Age: 73
LOS: 11 days | Discharge: HOME | DRG: 885 | End: 2023-06-06
Attending: PSYCHIATRY & NEUROLOGY | Admitting: PSYCHIATRY & NEUROLOGY
Payer: MEDICARE

## 2023-05-26 ENCOUNTER — APPOINTMENT (OUTPATIENT)
Dept: CT IMAGING | Age: 73
End: 2023-05-26
Payer: MEDICARE

## 2023-05-26 ENCOUNTER — HOSPITAL ENCOUNTER (EMERGENCY)
Age: 73
Discharge: PSYCHIATRIC OR SUBSTANCE USE FACILITY | End: 2023-05-26
Attending: STUDENT IN AN ORGANIZED HEALTH CARE EDUCATION/TRAINING PROGRAM
Payer: MEDICARE

## 2023-05-26 VITALS
BODY MASS INDEX: 19.1 KG/M2 | RESPIRATION RATE: 20 BRPM | DIASTOLIC BLOOD PRESSURE: 76 MMHG | SYSTOLIC BLOOD PRESSURE: 150 MMHG | WEIGHT: 126 LBS | TEMPERATURE: 97.8 F | HEART RATE: 66 BPM | HEIGHT: 68 IN | OXYGEN SATURATION: 100 %

## 2023-05-26 VITALS
HEART RATE: 96 BPM | WEIGHT: 126 LBS | SYSTOLIC BLOOD PRESSURE: 138 MMHG | OXYGEN SATURATION: 99 % | BODY MASS INDEX: 19.16 KG/M2 | DIASTOLIC BLOOD PRESSURE: 74 MMHG

## 2023-05-26 DIAGNOSIS — R63.0 ANOREXIA: ICD-10-CM

## 2023-05-26 DIAGNOSIS — Z78.9 INABILITY TO PERFORM ACTIVITIES OF DAILY LIVING: ICD-10-CM

## 2023-05-26 DIAGNOSIS — F32.2 SEVERE DEPRESSION (HCC): Primary | ICD-10-CM

## 2023-05-26 DIAGNOSIS — R62.7 FTT (FAILURE TO THRIVE) IN ADULT: ICD-10-CM

## 2023-05-26 DIAGNOSIS — R45.851 SUICIDAL IDEATION: ICD-10-CM

## 2023-05-26 DIAGNOSIS — Z78.9 ACTIVITY OF DAILY LIVING ALTERATION: ICD-10-CM

## 2023-05-26 DIAGNOSIS — G47.00 INSOMNIA, UNSPECIFIED TYPE: ICD-10-CM

## 2023-05-26 DIAGNOSIS — Z86.59 HISTORY OF EATING DISORDER: ICD-10-CM

## 2023-05-26 DIAGNOSIS — F41.1 GENERALIZED ANXIETY DISORDER: ICD-10-CM

## 2023-05-26 DIAGNOSIS — Z91.89 AT HIGH RISK FOR SELF HARM: ICD-10-CM

## 2023-05-26 DIAGNOSIS — F41.9 ANXIETY: ICD-10-CM

## 2023-05-26 DIAGNOSIS — F33.2 SEVERE EPISODE OF RECURRENT MAJOR DEPRESSIVE DISORDER, WITHOUT PSYCHOTIC FEATURES (HCC): Primary | ICD-10-CM

## 2023-05-26 PROBLEM — F33.9 MAJOR DEPRESSION, RECURRENT (HCC): Status: ACTIVE | Noted: 2023-05-26

## 2023-05-26 LAB
ALBUMIN SERPL-MCNC: 4.6 G/DL (ref 3.4–5)
ALBUMIN/GLOB SERPL: 2.1 {RATIO} (ref 1.1–2.2)
ALP SERPL-CCNC: 47 U/L (ref 40–129)
ALT SERPL-CCNC: 22 U/L (ref 10–40)
AMPHETAMINES UR QL SCN>1000 NG/ML: NORMAL
ANION GAP SERPL CALCULATED.3IONS-SCNC: 12 MMOL/L (ref 3–16)
APAP SERPL-MCNC: <5 UG/ML (ref 10–30)
AST SERPL-CCNC: 19 U/L (ref 15–37)
BARBITURATES UR QL SCN>200 NG/ML: NORMAL
BASOPHILS # BLD: 0 K/UL (ref 0–0.2)
BASOPHILS NFR BLD: 0.9 %
BENZODIAZ UR QL SCN>200 NG/ML: NORMAL
BILIRUB SERPL-MCNC: 0.5 MG/DL (ref 0–1)
BILIRUB UR QL STRIP.AUTO: NEGATIVE
BUN SERPL-MCNC: 12 MG/DL (ref 7–20)
CALCIUM SERPL-MCNC: 9.2 MG/DL (ref 8.3–10.6)
CANNABINOIDS UR QL SCN>50 NG/ML: NORMAL
CHLORIDE SERPL-SCNC: 92 MMOL/L (ref 99–110)
CLARITY UR: CLEAR
CO2 SERPL-SCNC: 23 MMOL/L (ref 21–32)
COCAINE UR QL SCN: NORMAL
COLOR UR: YELLOW
CREAT SERPL-MCNC: <0.5 MG/DL (ref 0.6–1.2)
DEPRECATED RDW RBC AUTO: 12.5 % (ref 12.4–15.4)
DRUG SCREEN COMMENT UR-IMP: NORMAL
EKG ATRIAL RATE: 66 BPM
EKG DIAGNOSIS: NORMAL
EKG P AXIS: 31 DEGREES
EKG P-R INTERVAL: 158 MS
EKG Q-T INTERVAL: 412 MS
EKG QRS DURATION: 84 MS
EKG QTC CALCULATION (BAZETT): 431 MS
EKG R AXIS: 46 DEGREES
EKG T AXIS: 52 DEGREES
EKG VENTRICULAR RATE: 66 BPM
EOSINOPHIL # BLD: 0 K/UL (ref 0–0.6)
EOSINOPHIL NFR BLD: 0.5 %
ETHANOLAMINE SERPL-MCNC: NORMAL MG/DL (ref 0–0.08)
FENTANYL SCREEN, URINE: NORMAL
FLUAV RNA UPPER RESP QL NAA+PROBE: NEGATIVE
FLUBV AG NPH QL: NEGATIVE
GFR SERPLBLD CREATININE-BSD FMLA CKD-EPI: >60 ML/MIN/{1.73_M2}
GLUCOSE SERPL-MCNC: 125 MG/DL (ref 70–99)
GLUCOSE UR STRIP.AUTO-MCNC: NEGATIVE MG/DL
HCT VFR BLD AUTO: 36.5 % (ref 36–48)
HGB BLD-MCNC: 12.3 G/DL (ref 12–16)
HGB UR QL STRIP.AUTO: NEGATIVE
KETONES UR STRIP.AUTO-MCNC: 40 MG/DL
LEUKOCYTE ESTERASE UR QL STRIP.AUTO: NEGATIVE
LYMPHOCYTES # BLD: 1.4 K/UL (ref 1–5.1)
LYMPHOCYTES NFR BLD: 25.5 %
MCH RBC QN AUTO: 30.6 PG (ref 26–34)
MCHC RBC AUTO-ENTMCNC: 33.7 G/DL (ref 31–36)
MCV RBC AUTO: 91 FL (ref 80–100)
METHADONE UR QL SCN>300 NG/ML: NORMAL
MONOCYTES # BLD: 0.5 K/UL (ref 0–1.3)
MONOCYTES NFR BLD: 8.7 %
NEUTROPHILS # BLD: 3.5 K/UL (ref 1.7–7.7)
NEUTROPHILS NFR BLD: 64.4 %
NITRITE UR QL STRIP.AUTO: NEGATIVE
OPIATES UR QL SCN>300 NG/ML: NORMAL
OXYCODONE UR QL SCN: NORMAL
PCP UR QL SCN>25 NG/ML: NORMAL
PH UR STRIP.AUTO: 7 [PH] (ref 5–8)
PH UR STRIP: 7 [PH]
PLATELET # BLD AUTO: 399 K/UL (ref 135–450)
PMV BLD AUTO: 8.2 FL (ref 5–10.5)
POTASSIUM SERPL-SCNC: 3.9 MMOL/L (ref 3.5–5.1)
PROT SERPL-MCNC: 6.8 G/DL (ref 6.4–8.2)
PROT UR STRIP.AUTO-MCNC: NEGATIVE MG/DL
RBC # BLD AUTO: 4.01 M/UL (ref 4–5.2)
SALICYLATES SERPL-MCNC: 0.5 MG/DL (ref 15–30)
SARS-COV-2 RDRP RESP QL NAA+PROBE: NOT DETECTED
SODIUM SERPL-SCNC: 127 MMOL/L (ref 136–145)
SP GR UR STRIP.AUTO: 1.01 (ref 1–1.03)
UA COMPLETE W REFLEX CULTURE PNL UR: ABNORMAL
UA DIPSTICK W REFLEX MICRO PNL UR: ABNORMAL
URN SPEC COLLECT METH UR: ABNORMAL
UROBILINOGEN UR STRIP-ACNC: 0.2 E.U./DL
WBC # BLD AUTO: 5.5 K/UL (ref 4–11)

## 2023-05-26 PROCEDURE — 80143 DRUG ASSAY ACETAMINOPHEN: CPT

## 2023-05-26 PROCEDURE — 85025 COMPLETE CBC W/AUTO DIFF WBC: CPT

## 2023-05-26 PROCEDURE — 93005 ELECTROCARDIOGRAM TRACING: CPT | Performed by: STUDENT IN AN ORGANIZED HEALTH CARE EDUCATION/TRAINING PROGRAM

## 2023-05-26 PROCEDURE — 36415 COLL VENOUS BLD VENIPUNCTURE: CPT

## 2023-05-26 PROCEDURE — 80053 COMPREHEN METABOLIC PANEL: CPT

## 2023-05-26 PROCEDURE — 99285 EMERGENCY DEPT VISIT HI MDM: CPT

## 2023-05-26 PROCEDURE — 81003 URINALYSIS AUTO W/O SCOPE: CPT

## 2023-05-26 PROCEDURE — 93010 ELECTROCARDIOGRAM REPORT: CPT | Performed by: INTERNAL MEDICINE

## 2023-05-26 PROCEDURE — 82077 ASSAY SPEC XCP UR&BREATH IA: CPT

## 2023-05-26 PROCEDURE — 87635 SARS-COV-2 COVID-19 AMP PRB: CPT

## 2023-05-26 PROCEDURE — 1240000000 HC EMOTIONAL WELLNESS R&B

## 2023-05-26 PROCEDURE — 80179 DRUG ASSAY SALICYLATE: CPT

## 2023-05-26 PROCEDURE — 70450 CT HEAD/BRAIN W/O DYE: CPT

## 2023-05-26 PROCEDURE — 87804 INFLUENZA ASSAY W/OPTIC: CPT

## 2023-05-26 PROCEDURE — 6370000000 HC RX 637 (ALT 250 FOR IP): Performed by: PSYCHIATRY & NEUROLOGY

## 2023-05-26 PROCEDURE — 80307 DRUG TEST PRSMV CHEM ANLYZR: CPT

## 2023-05-26 RX ORDER — MAGNESIUM HYDROXIDE/ALUMINUM HYDROXICE/SIMETHICONE 120; 1200; 1200 MG/30ML; MG/30ML; MG/30ML
30 SUSPENSION ORAL EVERY 6 HOURS PRN
Status: DISCONTINUED | OUTPATIENT
Start: 2023-05-26 | End: 2023-06-06 | Stop reason: HOSPADM

## 2023-05-26 RX ORDER — CHOLECALCIFEROL (VITAMIN D3) 125 MCG
500 CAPSULE ORAL DAILY
Status: DISCONTINUED | OUTPATIENT
Start: 2023-05-27 | End: 2023-06-06 | Stop reason: HOSPADM

## 2023-05-26 RX ORDER — ACETAMINOPHEN 325 MG/1
650 TABLET ORAL EVERY 4 HOURS PRN
Status: DISCONTINUED | OUTPATIENT
Start: 2023-05-26 | End: 2023-06-06 | Stop reason: HOSPADM

## 2023-05-26 RX ORDER — BENZTROPINE MESYLATE 1 MG/ML
2 INJECTION INTRAMUSCULAR; INTRAVENOUS 2 TIMES DAILY PRN
Status: DISCONTINUED | OUTPATIENT
Start: 2023-05-26 | End: 2023-06-06 | Stop reason: HOSPADM

## 2023-05-26 RX ORDER — BUSPIRONE HYDROCHLORIDE 10 MG/1
10 TABLET ORAL 2 TIMES DAILY
Status: DISCONTINUED | OUTPATIENT
Start: 2023-05-26 | End: 2023-05-27

## 2023-05-26 RX ORDER — HYDROXYZINE 50 MG/1
50 TABLET, FILM COATED ORAL 3 TIMES DAILY PRN
Status: DISCONTINUED | OUTPATIENT
Start: 2023-05-26 | End: 2023-06-06 | Stop reason: HOSPADM

## 2023-05-26 RX ORDER — NICOTINE 21 MG/24HR
1 PATCH, TRANSDERMAL 24 HOURS TRANSDERMAL DAILY
Status: DISCONTINUED | OUTPATIENT
Start: 2023-05-27 | End: 2023-06-03

## 2023-05-26 RX ORDER — B-COMPLEX WITH VITAMIN C
250 TABLET ORAL
Status: DISCONTINUED | OUTPATIENT
Start: 2023-05-27 | End: 2023-05-27 | Stop reason: RX

## 2023-05-26 RX ORDER — POLYETHYLENE GLYCOL 3350 17 G
2 POWDER IN PACKET (EA) ORAL
Status: DISCONTINUED | OUTPATIENT
Start: 2023-05-26 | End: 2023-06-06 | Stop reason: HOSPADM

## 2023-05-26 RX ORDER — TRAZODONE HYDROCHLORIDE 50 MG/1
50 TABLET ORAL NIGHTLY PRN
Status: DISCONTINUED | OUTPATIENT
Start: 2023-05-26 | End: 2023-06-06 | Stop reason: HOSPADM

## 2023-05-26 RX ORDER — OLANZAPINE 10 MG/1
10 TABLET ORAL EVERY 4 HOURS PRN
Status: DISCONTINUED | OUTPATIENT
Start: 2023-05-26 | End: 2023-05-26

## 2023-05-26 RX ORDER — AMLODIPINE BESYLATE 5 MG/1
5 TABLET ORAL 2 TIMES DAILY
Status: DISCONTINUED | OUTPATIENT
Start: 2023-05-26 | End: 2023-06-06 | Stop reason: HOSPADM

## 2023-05-26 RX ORDER — M-VIT,TX,IRON,MINS/CALC/FOLIC 27MG-0.4MG
1 TABLET ORAL DAILY
Status: DISCONTINUED | OUTPATIENT
Start: 2023-05-27 | End: 2023-06-06 | Stop reason: HOSPADM

## 2023-05-26 RX ORDER — IBUPROFEN 400 MG/1
400 TABLET ORAL EVERY 6 HOURS PRN
Status: DISCONTINUED | OUTPATIENT
Start: 2023-05-26 | End: 2023-06-06 | Stop reason: HOSPADM

## 2023-05-26 RX ORDER — CHLORPHENIR/PHENYLEPH/ASPIRIN 2-7.8-325
250 TABLET, EFFERVESCENT ORAL DAILY
Status: DISCONTINUED | OUTPATIENT
Start: 2023-05-27 | End: 2023-05-26 | Stop reason: CLARIF

## 2023-05-26 RX ORDER — CARVEDILOL 6.25 MG/1
6.25 TABLET ORAL 2 TIMES DAILY WITH MEALS
Status: DISCONTINUED | OUTPATIENT
Start: 2023-05-26 | End: 2023-06-06 | Stop reason: HOSPADM

## 2023-05-26 RX ORDER — OLANZAPINE 5 MG/1
5 TABLET ORAL EVERY 4 HOURS PRN
Status: DISCONTINUED | OUTPATIENT
Start: 2023-05-26 | End: 2023-06-06 | Stop reason: HOSPADM

## 2023-05-26 RX ORDER — LISINOPRIL 20 MG/1
20 TABLET ORAL DAILY
Status: DISCONTINUED | OUTPATIENT
Start: 2023-05-27 | End: 2023-06-06 | Stop reason: HOSPADM

## 2023-05-26 RX ADMIN — IBUPROFEN 400 MG: 400 TABLET, FILM COATED ORAL at 22:40

## 2023-05-26 RX ADMIN — BUSPIRONE HYDROCHLORIDE 10 MG: 10 TABLET ORAL at 22:40

## 2023-05-26 RX ADMIN — AMLODIPINE BESYLATE 5 MG: 5 TABLET ORAL at 22:40

## 2023-05-26 ASSESSMENT — PATIENT HEALTH QUESTIONNAIRE - PHQ9
1. LITTLE INTEREST OR PLEASURE IN DOING THINGS: 3
SUM OF ALL RESPONSES TO PHQ9 QUESTIONS 1 & 2: 6
6. FEELING BAD ABOUT YOURSELF - OR THAT YOU ARE A FAILURE OR HAVE LET YOURSELF OR YOUR FAMILY DOWN: 3
2. FEELING DOWN, DEPRESSED OR HOPELESS: 3
SUM OF ALL RESPONSES TO PHQ QUESTIONS 1-9: 27
7. TROUBLE CONCENTRATING ON THINGS, SUCH AS READING THE NEWSPAPER OR WATCHING TELEVISION: 3
SUM OF ALL RESPONSES TO PHQ QUESTIONS 1-9: 24
3. TROUBLE FALLING OR STAYING ASLEEP: 3
5. POOR APPETITE OR OVEREATING: 3
SUM OF ALL RESPONSES TO PHQ QUESTIONS 1-9: 27
4. FEELING TIRED OR HAVING LITTLE ENERGY: 3
SUM OF ALL RESPONSES TO PHQ QUESTIONS 1-9: 13
SUM OF ALL RESPONSES TO PHQ QUESTIONS 1-9: 27
8. MOVING OR SPEAKING SO SLOWLY THAT OTHER PEOPLE COULD HAVE NOTICED. OR THE OPPOSITE, BEING SO FIGETY OR RESTLESS THAT YOU HAVE BEEN MOVING AROUND A LOT MORE THAN USUAL: 3
9. THOUGHTS THAT YOU WOULD BE BETTER OFF DEAD, OR OF HURTING YOURSELF: 3

## 2023-05-26 ASSESSMENT — PAIN SCALES - GENERAL
PAINLEVEL_OUTOF10: 2
PAINLEVEL_OUTOF10: 4

## 2023-05-26 ASSESSMENT — SLEEP AND FATIGUE QUESTIONNAIRES
DO YOU USE A SLEEP AID: COMMENT
DO YOU HAVE DIFFICULTY SLEEPING: YES
AVERAGE NUMBER OF SLEEP HOURS: 4
SLEEP PATTERN: DISTURBED/INTERRUPTED SLEEP;DIFFICULTY FALLING ASLEEP

## 2023-05-26 ASSESSMENT — LIFESTYLE VARIABLES
HOW OFTEN DO YOU HAVE A DRINK CONTAINING ALCOHOL: MONTHLY OR LESS
HOW MANY STANDARD DRINKS CONTAINING ALCOHOL DO YOU HAVE ON A TYPICAL DAY: PATIENT DOES NOT DRINK
HOW OFTEN DO YOU HAVE A DRINK CONTAINING ALCOHOL: MONTHLY OR LESS
HOW MANY STANDARD DRINKS CONTAINING ALCOHOL DO YOU HAVE ON A TYPICAL DAY: PATIENT DOES NOT DRINK

## 2023-05-26 ASSESSMENT — PAIN DESCRIPTION - LOCATION
LOCATION: BACK
LOCATION: BACK

## 2023-05-26 ASSESSMENT — PAIN - FUNCTIONAL ASSESSMENT: PAIN_FUNCTIONAL_ASSESSMENT: NONE - DENIES PAIN

## 2023-05-26 NOTE — ED NOTES
Family(2 daughter in laws and 1 daughter) as well as patient aware that MD would like to rule out anything medical going on before placing a sitter in room. Family will stay in room with patient and if all 3 need to leave family will let RN know so we can place a sitter in the room. Call light given.      Preeti Valentin RN  05/26/23 4016

## 2023-05-26 NOTE — ED NOTES
Pt and family updated on plan for transfer, awaiting transport time  Pt remains calm and cooperative  Sitter at bedside  Room remains safe     David Stack RN  05/26/23 4428

## 2023-05-26 NOTE — ED NOTES
Family brought in a fruit cup, soda, and a chicken sandwich. Patient ate half of the meal and drank about 4 ounces of soda.       Jaspal Negrete RN  05/26/23 5845

## 2023-05-26 NOTE — ED NOTES
Per Dr Angela Mccullough, pt is placed on a hold for safety, medical reason has been ruled out. Notified sitter Abbey Montes De Oca to sit at bedside, family and pt updated on plan of care moving forward. Pt placed in a gown with strings removed, all belongings removed, inventoried and given to security, ED-19  Room is safe for patient, all monitor wires removed, all other cords removed except call light. Call light given to patient . Door remains open for safety of patient and .       Kelli Quiroz RN  05/26/23 1257

## 2023-05-26 NOTE — ED PROVIDER NOTES
HISTORY     Past Medical History:   Diagnosis Date    Arthritis     knee    Hyperlipidemia     Hypertension          SURGICAL HISTORY       Past Surgical History:   Procedure Laterality Date    FEMUR FRACTURE SURGERY Right 9/21/2022    OPEN REDUCTION INTERNAL FIXATION OF RIGHT DISTAL FEMUR performed by Rg Cordero MD at [facility] 2 times. TOTAL KNEE ARTHROPLASTY Right 1/28/2020    RIGHT ROBOTIC ASSISTED TOTAL KNEE ARTHROPLASTY - JEFFERS & NEPHEW ADVANCED performed by Chika Ferguson MD at [facility] [address]       Previous Medications    ACETAMINOPHEN (TYLENOL) 500 MG TABLET    Take 2 tablets by mouth in the morning, at noon, and at bedtime for 14 days    AMLODIPINE (NORVASC) 5 MG TABLET    TAKE 1 TABLET BY MOUTH IN THE MORNING AND AT BEDTIME    BUSPIRONE (BUSPAR) 5 MG TABLET    Take 2 tablets by mouth in the morning and at bedtime    CALCIUM CARBONATE-VITAMIN D (CALCIUM + D PO)    Take 1 tablet by mouth daily     CARVEDILOL (COREG) 6.25 MG TABLET    TAKE 1 TABLET BY MOUTH IN THE MORNING AND AT BEDTIME    CRANBERRY 1000 MG CAPS    Take 1 tablet by mouth daily     GLUCOSAMINE-CHONDROIT-VIT C-MN (GLUCOSAMINE CHONDR 1500 COMPLX) CAPS    Take 1 tablet by mouth daily     LISINOPRIL (PRINIVIL;ZESTRIL) 20 MG TABLET    Take 1 tablet by mouth daily    LORATADINE (CLARITIN PO)    Take 1 tablet by mouth daily. LOVASTATIN (MEVACOR) 20 MG TABLET    Take 1 tablet by mouth nightly    MELATONIN PO    Take by mouth    MULTIPLE VITAMINS-MINERALS (ONE-A-DAY WOMENS 50 PLUS PO)    Take 1 tablet by mouth daily.     NIACIN 250 MG TABLET    Take 1 tablet by mouth daily (with breakfast)    SERTRALINE (ZOLOFT) 50 MG TABLET    Take 1 tablet by mouth daily    TRAZODONE (DESYREL) 50 MG TABLET    Take 1 tablet by mouth nightly as needed for Sleep    VITAMIN B-12 (CYANOCOBALAMIN) 500 MCG TABLET    Take 1 tablet by mouth daily       ALLERGIES
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
## 2023-05-27 PROBLEM — Z00.00 ENCOUNTER FOR GENERAL MEDICAL EXAMINATION: Status: ACTIVE | Noted: 2023-05-27

## 2023-05-27 PROBLEM — E87.1 HYPONATREMIA: Status: ACTIVE | Noted: 2023-05-27

## 2023-05-27 PROBLEM — I10 HYPERTENSION: Status: ACTIVE | Noted: 2023-05-27

## 2023-05-27 LAB
CHOLEST SERPL-MCNC: 157 MG/DL (ref 0–199)
HDLC SERPL-MCNC: 66 MG/DL (ref 40–60)
LDLC SERPL CALC-MCNC: 78 MG/DL
TRIGL SERPL-MCNC: 64 MG/DL (ref 0–150)
TSH SERPL DL<=0.005 MIU/L-ACNC: 1.23 UIU/ML (ref 0.27–4.2)
VLDLC SERPL CALC-MCNC: 13 MG/DL

## 2023-05-27 PROCEDURE — 1240000000 HC EMOTIONAL WELLNESS R&B

## 2023-05-27 PROCEDURE — 83036 HEMOGLOBIN GLYCOSYLATED A1C: CPT

## 2023-05-27 PROCEDURE — 6370000000 HC RX 637 (ALT 250 FOR IP)

## 2023-05-27 PROCEDURE — 6370000000 HC RX 637 (ALT 250 FOR IP): Performed by: PSYCHIATRY & NEUROLOGY

## 2023-05-27 PROCEDURE — 36415 COLL VENOUS BLD VENIPUNCTURE: CPT

## 2023-05-27 PROCEDURE — 99222 1ST HOSP IP/OBS MODERATE 55: CPT

## 2023-05-27 PROCEDURE — 99223 1ST HOSP IP/OBS HIGH 75: CPT | Performed by: PSYCHIATRY & NEUROLOGY

## 2023-05-27 PROCEDURE — 84443 ASSAY THYROID STIM HORMONE: CPT

## 2023-05-27 PROCEDURE — 80061 LIPID PANEL: CPT

## 2023-05-27 RX ORDER — DULOXETIN HYDROCHLORIDE 30 MG/1
30 CAPSULE, DELAYED RELEASE ORAL DAILY
Status: DISCONTINUED | OUTPATIENT
Start: 2023-05-27 | End: 2023-05-29

## 2023-05-27 RX ORDER — BUSPIRONE HYDROCHLORIDE 7.5 MG/1
15 TABLET ORAL 2 TIMES DAILY
Status: DISCONTINUED | OUTPATIENT
Start: 2023-05-27 | End: 2023-05-31

## 2023-05-27 RX ORDER — POLYETHYLENE GLYCOL 3350 17 G/17G
17 POWDER, FOR SOLUTION ORAL DAILY PRN
Status: DISCONTINUED | OUTPATIENT
Start: 2023-05-27 | End: 2023-06-06 | Stop reason: HOSPADM

## 2023-05-27 RX ORDER — ATORVASTATIN CALCIUM 10 MG/1
10 TABLET, FILM COATED ORAL NIGHTLY
Status: DISCONTINUED | OUTPATIENT
Start: 2023-05-27 | End: 2023-06-06 | Stop reason: HOSPADM

## 2023-05-27 RX ADMIN — LISINOPRIL 20 MG: 20 TABLET ORAL at 09:51

## 2023-05-27 RX ADMIN — BUSPIRONE HYDROCHLORIDE 10 MG: 10 TABLET ORAL at 09:50

## 2023-05-27 RX ADMIN — Medication 500 MCG: at 09:50

## 2023-05-27 RX ADMIN — ATORVASTATIN CALCIUM 10 MG: 10 TABLET, FILM COATED ORAL at 20:25

## 2023-05-27 RX ADMIN — BUSPIRONE HYDROCHLORIDE 15 MG: 7.5 TABLET ORAL at 20:25

## 2023-05-27 RX ADMIN — CARVEDILOL 6.25 MG: 6.25 TABLET, FILM COATED ORAL at 09:58

## 2023-05-27 RX ADMIN — CARVEDILOL 6.25 MG: 6.25 TABLET, FILM COATED ORAL at 16:26

## 2023-05-27 RX ADMIN — AMLODIPINE BESYLATE 5 MG: 5 TABLET ORAL at 20:25

## 2023-05-27 RX ADMIN — AMLODIPINE BESYLATE 5 MG: 5 TABLET ORAL at 09:51

## 2023-05-27 RX ADMIN — TRAZODONE HYDROCHLORIDE 50 MG: 50 TABLET ORAL at 20:25

## 2023-05-27 RX ADMIN — IBUPROFEN 400 MG: 400 TABLET, FILM COATED ORAL at 20:34

## 2023-05-27 RX ADMIN — DULOXETINE HYDROCHLORIDE 30 MG: 30 CAPSULE, DELAYED RELEASE ORAL at 11:29

## 2023-05-27 RX ADMIN — MULTIPLE VITAMINS W/ MINERALS TAB 1 TABLET: TAB at 09:51

## 2023-05-27 RX ADMIN — CALCIUM CARBONATE-VITAMIN D TAB 500 MG-200 UNIT 1 TABLET: 500-200 TAB at 11:29

## 2023-05-27 ASSESSMENT — PAIN DESCRIPTION - DESCRIPTORS: DESCRIPTORS: ACHING

## 2023-05-27 ASSESSMENT — LIFESTYLE VARIABLES
HOW OFTEN DO YOU HAVE A DRINK CONTAINING ALCOHOL: NEVER
HOW MANY STANDARD DRINKS CONTAINING ALCOHOL DO YOU HAVE ON A TYPICAL DAY: PATIENT DOES NOT DRINK

## 2023-05-27 ASSESSMENT — SLEEP AND FATIGUE QUESTIONNAIRES
DO YOU USE A SLEEP AID: NO
AVERAGE NUMBER OF SLEEP HOURS: 4
DO YOU HAVE DIFFICULTY SLEEPING: YES
SLEEP PATTERN: DIFFICULTY FALLING ASLEEP;RESTLESSNESS

## 2023-05-27 ASSESSMENT — PAIN SCALES - GENERAL
PAINLEVEL_OUTOF10: 7
PAINLEVEL_OUTOF10: 0
PAINLEVEL_OUTOF10: 0

## 2023-05-27 ASSESSMENT — PAIN DESCRIPTION - LOCATION: LOCATION: BACK

## 2023-05-27 ASSESSMENT — PAIN - FUNCTIONAL ASSESSMENT: PAIN_FUNCTIONAL_ASSESSMENT: ACTIVITIES ARE NOT PREVENTED

## 2023-05-27 ASSESSMENT — PATIENT HEALTH QUESTIONNAIRE - PHQ9: SUM OF ALL RESPONSES TO PHQ QUESTIONS 1-9: 22

## 2023-05-27 ASSESSMENT — PAIN SCALES - WONG BAKER: WONGBAKER_NUMERICALRESPONSE: 0

## 2023-05-28 LAB
ANION GAP SERPL CALCULATED.3IONS-SCNC: 9 MMOL/L (ref 3–16)
BUN SERPL-MCNC: 15 MG/DL (ref 7–20)
CALCIUM SERPL-MCNC: 9.1 MG/DL (ref 8.3–10.6)
CHLORIDE SERPL-SCNC: 93 MMOL/L (ref 99–110)
CO2 SERPL-SCNC: 22 MMOL/L (ref 21–32)
CREAT SERPL-MCNC: <0.5 MG/DL (ref 0.6–1.2)
EST. AVERAGE GLUCOSE BLD GHB EST-MCNC: 114 MG/DL
GFR SERPLBLD CREATININE-BSD FMLA CKD-EPI: >60 ML/MIN/{1.73_M2}
GLUCOSE SERPL-MCNC: 122 MG/DL (ref 70–99)
HBA1C MFR BLD: 5.6 %
POTASSIUM SERPL-SCNC: 4 MMOL/L (ref 3.5–5.1)
SODIUM SERPL-SCNC: 124 MMOL/L (ref 136–145)

## 2023-05-28 PROCEDURE — 97530 THERAPEUTIC ACTIVITIES: CPT

## 2023-05-28 PROCEDURE — 80048 BASIC METABOLIC PNL TOTAL CA: CPT

## 2023-05-28 PROCEDURE — 97116 GAIT TRAINING THERAPY: CPT

## 2023-05-28 PROCEDURE — 6370000000 HC RX 637 (ALT 250 FOR IP): Performed by: PSYCHIATRY & NEUROLOGY

## 2023-05-28 PROCEDURE — 1240000000 HC EMOTIONAL WELLNESS R&B

## 2023-05-28 PROCEDURE — 97166 OT EVAL MOD COMPLEX 45 MIN: CPT

## 2023-05-28 PROCEDURE — 36415 COLL VENOUS BLD VENIPUNCTURE: CPT

## 2023-05-28 PROCEDURE — 6370000000 HC RX 637 (ALT 250 FOR IP)

## 2023-05-28 PROCEDURE — 97162 PT EVAL MOD COMPLEX 30 MIN: CPT

## 2023-05-28 RX ADMIN — IBUPROFEN 400 MG: 400 TABLET, FILM COATED ORAL at 22:25

## 2023-05-28 RX ADMIN — CALCIUM CARBONATE-VITAMIN D TAB 500 MG-200 UNIT 1 TABLET: 500-200 TAB at 07:56

## 2023-05-28 RX ADMIN — POLYETHYLENE GLYCOL 3350 17 G: 17 POWDER, FOR SOLUTION ORAL at 07:57

## 2023-05-28 RX ADMIN — LISINOPRIL 20 MG: 20 TABLET ORAL at 07:56

## 2023-05-28 RX ADMIN — MULTIPLE VITAMINS W/ MINERALS TAB 1 TABLET: TAB at 07:56

## 2023-05-28 RX ADMIN — BUSPIRONE HYDROCHLORIDE 15 MG: 7.5 TABLET ORAL at 21:28

## 2023-05-28 RX ADMIN — DULOXETINE HYDROCHLORIDE 30 MG: 30 CAPSULE, DELAYED RELEASE ORAL at 07:56

## 2023-05-28 RX ADMIN — AMLODIPINE BESYLATE 5 MG: 5 TABLET ORAL at 07:56

## 2023-05-28 RX ADMIN — BUSPIRONE HYDROCHLORIDE 15 MG: 7.5 TABLET ORAL at 07:56

## 2023-05-28 RX ADMIN — CARVEDILOL 6.25 MG: 6.25 TABLET, FILM COATED ORAL at 07:56

## 2023-05-28 RX ADMIN — Medication 500 MCG: at 07:56

## 2023-05-28 RX ADMIN — CARVEDILOL 6.25 MG: 6.25 TABLET, FILM COATED ORAL at 16:31

## 2023-05-28 RX ADMIN — ATORVASTATIN CALCIUM 10 MG: 10 TABLET, FILM COATED ORAL at 21:29

## 2023-05-28 RX ADMIN — AMLODIPINE BESYLATE 5 MG: 5 TABLET ORAL at 21:29

## 2023-05-28 ASSESSMENT — PAIN DESCRIPTION - LOCATION
LOCATION: GENERALIZED
LOCATION: KNEE

## 2023-05-28 ASSESSMENT — PAIN SCALES - GENERAL
PAINLEVEL_OUTOF10: 2
PAINLEVEL_OUTOF10: 0
PAINLEVEL_OUTOF10: 4

## 2023-05-28 ASSESSMENT — PAIN DESCRIPTION - ONSET: ONSET: GRADUAL

## 2023-05-28 ASSESSMENT — PAIN - FUNCTIONAL ASSESSMENT: PAIN_FUNCTIONAL_ASSESSMENT: ACTIVITIES ARE NOT PREVENTED

## 2023-05-28 ASSESSMENT — PAIN DESCRIPTION - DESCRIPTORS
DESCRIPTORS: DISCOMFORT
DESCRIPTORS: ACHING

## 2023-05-28 ASSESSMENT — PAIN DESCRIPTION - ORIENTATION: ORIENTATION: RIGHT;LEFT

## 2023-05-28 ASSESSMENT — PAIN DESCRIPTION - FREQUENCY: FREQUENCY: INTERMITTENT

## 2023-05-29 LAB
ANION GAP SERPL CALCULATED.3IONS-SCNC: 7 MMOL/L (ref 3–16)
BUN SERPL-MCNC: 20 MG/DL (ref 7–20)
CALCIUM SERPL-MCNC: 9.3 MG/DL (ref 8.3–10.6)
CHLORIDE SERPL-SCNC: 95 MMOL/L (ref 99–110)
CO2 SERPL-SCNC: 24 MMOL/L (ref 21–32)
CREAT SERPL-MCNC: <0.5 MG/DL (ref 0.6–1.2)
GFR SERPLBLD CREATININE-BSD FMLA CKD-EPI: >60 ML/MIN/{1.73_M2}
GLUCOSE SERPL-MCNC: 111 MG/DL (ref 70–99)
POTASSIUM SERPL-SCNC: 4.5 MMOL/L (ref 3.5–5.1)
SODIUM SERPL-SCNC: 126 MMOL/L (ref 136–145)

## 2023-05-29 PROCEDURE — 6370000000 HC RX 637 (ALT 250 FOR IP): Performed by: PSYCHIATRY & NEUROLOGY

## 2023-05-29 PROCEDURE — 99233 SBSQ HOSP IP/OBS HIGH 50: CPT | Performed by: PSYCHIATRY & NEUROLOGY

## 2023-05-29 PROCEDURE — 6370000000 HC RX 637 (ALT 250 FOR IP)

## 2023-05-29 PROCEDURE — 1240000000 HC EMOTIONAL WELLNESS R&B

## 2023-05-29 PROCEDURE — 36415 COLL VENOUS BLD VENIPUNCTURE: CPT

## 2023-05-29 PROCEDURE — 80048 BASIC METABOLIC PNL TOTAL CA: CPT

## 2023-05-29 RX ORDER — DULOXETIN HYDROCHLORIDE 60 MG/1
60 CAPSULE, DELAYED RELEASE ORAL DAILY
Status: DISCONTINUED | OUTPATIENT
Start: 2023-05-30 | End: 2023-06-06 | Stop reason: HOSPADM

## 2023-05-29 RX ORDER — DULOXETIN HYDROCHLORIDE 30 MG/1
30 CAPSULE, DELAYED RELEASE ORAL ONCE
Status: COMPLETED | OUTPATIENT
Start: 2023-05-29 | End: 2023-05-29

## 2023-05-29 RX ADMIN — CARVEDILOL 6.25 MG: 6.25 TABLET, FILM COATED ORAL at 10:29

## 2023-05-29 RX ADMIN — Medication 500 MCG: at 10:28

## 2023-05-29 RX ADMIN — TRAZODONE HYDROCHLORIDE 50 MG: 50 TABLET ORAL at 22:28

## 2023-05-29 RX ADMIN — DULOXETINE HYDROCHLORIDE 30 MG: 30 CAPSULE, DELAYED RELEASE ORAL at 15:31

## 2023-05-29 RX ADMIN — DULOXETINE HYDROCHLORIDE 30 MG: 30 CAPSULE, DELAYED RELEASE ORAL at 10:28

## 2023-05-29 RX ADMIN — CALCIUM CARBONATE-VITAMIN D TAB 500 MG-200 UNIT 1 TABLET: 500-200 TAB at 10:28

## 2023-05-29 RX ADMIN — IBUPROFEN 400 MG: 400 TABLET, FILM COATED ORAL at 21:03

## 2023-05-29 RX ADMIN — BUSPIRONE HYDROCHLORIDE 15 MG: 7.5 TABLET ORAL at 10:28

## 2023-05-29 RX ADMIN — MULTIPLE VITAMINS W/ MINERALS TAB 1 TABLET: TAB at 10:28

## 2023-05-29 RX ADMIN — AMLODIPINE BESYLATE 5 MG: 5 TABLET ORAL at 21:04

## 2023-05-29 RX ADMIN — LISINOPRIL 20 MG: 20 TABLET ORAL at 10:28

## 2023-05-29 RX ADMIN — AMLODIPINE BESYLATE 5 MG: 5 TABLET ORAL at 10:28

## 2023-05-29 RX ADMIN — CARVEDILOL 6.25 MG: 6.25 TABLET, FILM COATED ORAL at 18:24

## 2023-05-29 RX ADMIN — ATORVASTATIN CALCIUM 10 MG: 10 TABLET, FILM COATED ORAL at 21:04

## 2023-05-29 RX ADMIN — BUSPIRONE HYDROCHLORIDE 15 MG: 7.5 TABLET ORAL at 21:03

## 2023-05-29 ASSESSMENT — PAIN SCALES - GENERAL: PAINLEVEL_OUTOF10: 4

## 2023-05-29 ASSESSMENT — PAIN DESCRIPTION - LOCATION: LOCATION: LEG

## 2023-05-29 ASSESSMENT — PAIN SCALES - WONG BAKER: WONGBAKER_NUMERICALRESPONSE: 0

## 2023-05-29 ASSESSMENT — PAIN DESCRIPTION - ORIENTATION: ORIENTATION: RIGHT

## 2023-05-29 ASSESSMENT — PAIN - FUNCTIONAL ASSESSMENT: PAIN_FUNCTIONAL_ASSESSMENT: ACTIVITIES ARE NOT PREVENTED

## 2023-05-30 LAB
ANION GAP SERPL CALCULATED.3IONS-SCNC: 7 MMOL/L (ref 3–16)
BUN SERPL-MCNC: 18 MG/DL (ref 7–20)
CALCIUM SERPL-MCNC: 9.7 MG/DL (ref 8.3–10.6)
CHLORIDE SERPL-SCNC: 97 MMOL/L (ref 99–110)
CO2 SERPL-SCNC: 25 MMOL/L (ref 21–32)
CREAT SERPL-MCNC: <0.5 MG/DL (ref 0.6–1.2)
GFR SERPLBLD CREATININE-BSD FMLA CKD-EPI: >60 ML/MIN/{1.73_M2}
GLUCOSE SERPL-MCNC: 122 MG/DL (ref 70–99)
POTASSIUM SERPL-SCNC: 4.1 MMOL/L (ref 3.5–5.1)
SODIUM SERPL-SCNC: 129 MMOL/L (ref 136–145)

## 2023-05-30 PROCEDURE — 1240000000 HC EMOTIONAL WELLNESS R&B

## 2023-05-30 PROCEDURE — 99233 SBSQ HOSP IP/OBS HIGH 50: CPT | Performed by: PSYCHIATRY & NEUROLOGY

## 2023-05-30 PROCEDURE — 6370000000 HC RX 637 (ALT 250 FOR IP): Performed by: PSYCHIATRY & NEUROLOGY

## 2023-05-30 PROCEDURE — 6370000000 HC RX 637 (ALT 250 FOR IP)

## 2023-05-30 PROCEDURE — 80048 BASIC METABOLIC PNL TOTAL CA: CPT

## 2023-05-30 PROCEDURE — 36415 COLL VENOUS BLD VENIPUNCTURE: CPT

## 2023-05-30 RX ADMIN — TRAZODONE HYDROCHLORIDE 50 MG: 50 TABLET ORAL at 22:40

## 2023-05-30 RX ADMIN — ATORVASTATIN CALCIUM 10 MG: 10 TABLET, FILM COATED ORAL at 20:56

## 2023-05-30 RX ADMIN — MULTIPLE VITAMINS W/ MINERALS TAB 1 TABLET: TAB at 09:12

## 2023-05-30 RX ADMIN — IBUPROFEN 400 MG: 400 TABLET, FILM COATED ORAL at 22:42

## 2023-05-30 RX ADMIN — Medication 500 MCG: at 09:13

## 2023-05-30 RX ADMIN — CARVEDILOL 6.25 MG: 6.25 TABLET, FILM COATED ORAL at 09:18

## 2023-05-30 RX ADMIN — CALCIUM CARBONATE-VITAMIN D TAB 500 MG-200 UNIT 1 TABLET: 500-200 TAB at 09:12

## 2023-05-30 RX ADMIN — LISINOPRIL 20 MG: 20 TABLET ORAL at 09:13

## 2023-05-30 RX ADMIN — DULOXETINE HYDROCHLORIDE 60 MG: 60 CAPSULE, DELAYED RELEASE ORAL at 09:12

## 2023-05-30 RX ADMIN — BUSPIRONE HYDROCHLORIDE 15 MG: 7.5 TABLET ORAL at 20:56

## 2023-05-30 RX ADMIN — CARVEDILOL 6.25 MG: 6.25 TABLET, FILM COATED ORAL at 17:18

## 2023-05-30 RX ADMIN — BUSPIRONE HYDROCHLORIDE 15 MG: 7.5 TABLET ORAL at 09:13

## 2023-05-30 RX ADMIN — AMLODIPINE BESYLATE 5 MG: 5 TABLET ORAL at 09:13

## 2023-05-30 RX ADMIN — AMLODIPINE BESYLATE 5 MG: 5 TABLET ORAL at 20:56

## 2023-05-30 ASSESSMENT — PAIN - FUNCTIONAL ASSESSMENT: PAIN_FUNCTIONAL_ASSESSMENT: ACTIVITIES ARE NOT PREVENTED

## 2023-05-30 ASSESSMENT — PAIN SCALES - GENERAL
PAINLEVEL_OUTOF10: 3
PAINLEVEL_OUTOF10: 0

## 2023-05-30 ASSESSMENT — PAIN DESCRIPTION - ORIENTATION: ORIENTATION: RIGHT

## 2023-05-30 ASSESSMENT — PAIN DESCRIPTION - LOCATION: LOCATION: LEG

## 2023-05-31 PROCEDURE — 97530 THERAPEUTIC ACTIVITIES: CPT

## 2023-05-31 PROCEDURE — 99233 SBSQ HOSP IP/OBS HIGH 50: CPT | Performed by: PSYCHIATRY & NEUROLOGY

## 2023-05-31 PROCEDURE — 6370000000 HC RX 637 (ALT 250 FOR IP): Performed by: PSYCHIATRY & NEUROLOGY

## 2023-05-31 PROCEDURE — 1240000000 HC EMOTIONAL WELLNESS R&B

## 2023-05-31 PROCEDURE — 6370000000 HC RX 637 (ALT 250 FOR IP)

## 2023-05-31 RX ORDER — BUSPIRONE HYDROCHLORIDE 7.5 MG/1
15 TABLET ORAL 3 TIMES DAILY
Status: DISCONTINUED | OUTPATIENT
Start: 2023-05-31 | End: 2023-06-06 | Stop reason: HOSPADM

## 2023-05-31 RX ORDER — CLONAZEPAM 0.5 MG/1
0.25 TABLET ORAL NIGHTLY
Status: COMPLETED | OUTPATIENT
Start: 2023-05-31 | End: 2023-05-31

## 2023-05-31 RX ADMIN — ATORVASTATIN CALCIUM 10 MG: 10 TABLET, FILM COATED ORAL at 20:12

## 2023-05-31 RX ADMIN — CARVEDILOL 6.25 MG: 6.25 TABLET, FILM COATED ORAL at 17:30

## 2023-05-31 RX ADMIN — LISINOPRIL 20 MG: 20 TABLET ORAL at 09:37

## 2023-05-31 RX ADMIN — DULOXETINE HYDROCHLORIDE 60 MG: 60 CAPSULE, DELAYED RELEASE ORAL at 09:37

## 2023-05-31 RX ADMIN — AMLODIPINE BESYLATE 5 MG: 5 TABLET ORAL at 20:12

## 2023-05-31 RX ADMIN — BUSPIRONE HYDROCHLORIDE 15 MG: 7.5 TABLET ORAL at 14:49

## 2023-05-31 RX ADMIN — CALCIUM CARBONATE-VITAMIN D TAB 500 MG-200 UNIT 1 TABLET: 500-200 TAB at 11:42

## 2023-05-31 RX ADMIN — AMLODIPINE BESYLATE 5 MG: 5 TABLET ORAL at 09:37

## 2023-05-31 RX ADMIN — CARVEDILOL 6.25 MG: 6.25 TABLET, FILM COATED ORAL at 09:37

## 2023-05-31 RX ADMIN — HYDROXYZINE HYDROCHLORIDE 50 MG: 50 TABLET, FILM COATED ORAL at 09:35

## 2023-05-31 RX ADMIN — BUSPIRONE HYDROCHLORIDE 15 MG: 7.5 TABLET ORAL at 09:35

## 2023-05-31 RX ADMIN — BUSPIRONE HYDROCHLORIDE 15 MG: 7.5 TABLET ORAL at 20:12

## 2023-05-31 RX ADMIN — Medication 500 MCG: at 09:35

## 2023-05-31 RX ADMIN — MULTIPLE VITAMINS W/ MINERALS TAB 1 TABLET: TAB at 09:37

## 2023-05-31 RX ADMIN — CLONAZEPAM 0.25 MG: 0.5 TABLET ORAL at 20:12

## 2023-05-31 ASSESSMENT — PAIN SCALES - GENERAL: PAINLEVEL_OUTOF10: 0

## 2023-05-31 ASSESSMENT — PAIN SCALES - WONG BAKER
WONGBAKER_NUMERICALRESPONSE: 0
WONGBAKER_NUMERICALRESPONSE: 0

## 2023-06-01 PROCEDURE — 6370000000 HC RX 637 (ALT 250 FOR IP)

## 2023-06-01 PROCEDURE — 6370000000 HC RX 637 (ALT 250 FOR IP): Performed by: PSYCHIATRY & NEUROLOGY

## 2023-06-01 PROCEDURE — 97530 THERAPEUTIC ACTIVITIES: CPT

## 2023-06-01 PROCEDURE — 1240000000 HC EMOTIONAL WELLNESS R&B

## 2023-06-01 PROCEDURE — 99233 SBSQ HOSP IP/OBS HIGH 50: CPT | Performed by: PSYCHIATRY & NEUROLOGY

## 2023-06-01 PROCEDURE — 97116 GAIT TRAINING THERAPY: CPT

## 2023-06-01 PROCEDURE — 97110 THERAPEUTIC EXERCISES: CPT

## 2023-06-01 RX ADMIN — LISINOPRIL 20 MG: 20 TABLET ORAL at 08:32

## 2023-06-01 RX ADMIN — AMLODIPINE BESYLATE 5 MG: 5 TABLET ORAL at 20:17

## 2023-06-01 RX ADMIN — BUSPIRONE HYDROCHLORIDE 15 MG: 7.5 TABLET ORAL at 14:57

## 2023-06-01 RX ADMIN — POLYETHYLENE GLYCOL 3350 17 G: 17 POWDER, FOR SOLUTION ORAL at 14:57

## 2023-06-01 RX ADMIN — CARVEDILOL 6.25 MG: 6.25 TABLET, FILM COATED ORAL at 08:32

## 2023-06-01 RX ADMIN — IBUPROFEN 400 MG: 400 TABLET, FILM COATED ORAL at 06:29

## 2023-06-01 RX ADMIN — BUSPIRONE HYDROCHLORIDE 15 MG: 7.5 TABLET ORAL at 20:17

## 2023-06-01 RX ADMIN — ATORVASTATIN CALCIUM 10 MG: 10 TABLET, FILM COATED ORAL at 20:17

## 2023-06-01 RX ADMIN — MULTIPLE VITAMINS W/ MINERALS TAB 1 TABLET: TAB at 08:32

## 2023-06-01 RX ADMIN — AMLODIPINE BESYLATE 5 MG: 5 TABLET ORAL at 08:32

## 2023-06-01 RX ADMIN — CALCIUM CARBONATE-VITAMIN D TAB 500 MG-200 UNIT 1 TABLET: 500-200 TAB at 08:33

## 2023-06-01 RX ADMIN — Medication 500 MCG: at 08:32

## 2023-06-01 RX ADMIN — BUSPIRONE HYDROCHLORIDE 15 MG: 7.5 TABLET ORAL at 08:31

## 2023-06-01 RX ADMIN — CARVEDILOL 6.25 MG: 6.25 TABLET, FILM COATED ORAL at 18:09

## 2023-06-01 RX ADMIN — DULOXETINE HYDROCHLORIDE 60 MG: 60 CAPSULE, DELAYED RELEASE ORAL at 08:32

## 2023-06-01 ASSESSMENT — PAIN DESCRIPTION - FREQUENCY: FREQUENCY: INTERMITTENT

## 2023-06-01 ASSESSMENT — PAIN SCALES - WONG BAKER: WONGBAKER_NUMERICALRESPONSE: 0

## 2023-06-01 ASSESSMENT — PAIN DESCRIPTION - DESCRIPTORS: DESCRIPTORS: ACHING

## 2023-06-01 ASSESSMENT — PAIN DESCRIPTION - ONSET: ONSET: GRADUAL

## 2023-06-01 ASSESSMENT — PAIN SCALES - GENERAL: PAINLEVEL_OUTOF10: 7

## 2023-06-01 ASSESSMENT — PAIN DESCRIPTION - LOCATION: LOCATION: GENERALIZED

## 2023-06-02 PROBLEM — M19.90 ARTHRITIS: Status: ACTIVE | Noted: 2023-06-02

## 2023-06-02 PROCEDURE — 1240000000 HC EMOTIONAL WELLNESS R&B

## 2023-06-02 PROCEDURE — 6370000000 HC RX 637 (ALT 250 FOR IP): Performed by: PSYCHIATRY & NEUROLOGY

## 2023-06-02 PROCEDURE — 99233 SBSQ HOSP IP/OBS HIGH 50: CPT | Performed by: PSYCHIATRY & NEUROLOGY

## 2023-06-02 PROCEDURE — 6370000000 HC RX 637 (ALT 250 FOR IP)

## 2023-06-02 RX ADMIN — CARVEDILOL 6.25 MG: 6.25 TABLET, FILM COATED ORAL at 09:06

## 2023-06-02 RX ADMIN — BUSPIRONE HYDROCHLORIDE 15 MG: 7.5 TABLET ORAL at 21:10

## 2023-06-02 RX ADMIN — MULTIPLE VITAMINS W/ MINERALS TAB 1 TABLET: TAB at 09:06

## 2023-06-02 RX ADMIN — AMLODIPINE BESYLATE 5 MG: 5 TABLET ORAL at 09:07

## 2023-06-02 RX ADMIN — DULOXETINE HYDROCHLORIDE 60 MG: 60 CAPSULE, DELAYED RELEASE ORAL at 09:06

## 2023-06-02 RX ADMIN — AMLODIPINE BESYLATE 5 MG: 5 TABLET ORAL at 21:11

## 2023-06-02 RX ADMIN — LISINOPRIL 20 MG: 20 TABLET ORAL at 09:06

## 2023-06-02 RX ADMIN — Medication 500 MCG: at 09:06

## 2023-06-02 RX ADMIN — CALCIUM CARBONATE-VITAMIN D TAB 500 MG-200 UNIT 1 TABLET: 500-200 TAB at 09:06

## 2023-06-02 RX ADMIN — CARVEDILOL 6.25 MG: 6.25 TABLET, FILM COATED ORAL at 16:40

## 2023-06-02 RX ADMIN — BUSPIRONE HYDROCHLORIDE 15 MG: 7.5 TABLET ORAL at 14:46

## 2023-06-02 RX ADMIN — ATORVASTATIN CALCIUM 10 MG: 10 TABLET, FILM COATED ORAL at 21:10

## 2023-06-02 RX ADMIN — POLYETHYLENE GLYCOL 3350 17 G: 17 POWDER, FOR SOLUTION ORAL at 09:10

## 2023-06-02 RX ADMIN — BUSPIRONE HYDROCHLORIDE 15 MG: 7.5 TABLET ORAL at 09:07

## 2023-06-02 ASSESSMENT — PAIN SCALES - GENERAL
PAINLEVEL_OUTOF10: 0
PAINLEVEL_OUTOF10: 0

## 2023-06-03 LAB
ANION GAP SERPL CALCULATED.3IONS-SCNC: 11 MMOL/L (ref 3–16)
BUN SERPL-MCNC: 16 MG/DL (ref 7–20)
CALCIUM SERPL-MCNC: 9.3 MG/DL (ref 8.3–10.6)
CHLORIDE SERPL-SCNC: 95 MMOL/L (ref 99–110)
CO2 SERPL-SCNC: 22 MMOL/L (ref 21–32)
CREAT SERPL-MCNC: <0.5 MG/DL (ref 0.6–1.2)
GFR SERPLBLD CREATININE-BSD FMLA CKD-EPI: >60 ML/MIN/{1.73_M2}
GLUCOSE SERPL-MCNC: 129 MG/DL (ref 70–99)
POTASSIUM SERPL-SCNC: 4.1 MMOL/L (ref 3.5–5.1)
SODIUM SERPL-SCNC: 128 MMOL/L (ref 136–145)

## 2023-06-03 PROCEDURE — 6370000000 HC RX 637 (ALT 250 FOR IP): Performed by: PSYCHIATRY & NEUROLOGY

## 2023-06-03 PROCEDURE — 6370000000 HC RX 637 (ALT 250 FOR IP)

## 2023-06-03 PROCEDURE — 80048 BASIC METABOLIC PNL TOTAL CA: CPT

## 2023-06-03 PROCEDURE — 1240000000 HC EMOTIONAL WELLNESS R&B

## 2023-06-03 PROCEDURE — 36415 COLL VENOUS BLD VENIPUNCTURE: CPT

## 2023-06-03 RX ADMIN — CALCIUM CARBONATE-VITAMIN D TAB 500 MG-200 UNIT 1 TABLET: 500-200 TAB at 09:59

## 2023-06-03 RX ADMIN — BUSPIRONE HYDROCHLORIDE 15 MG: 7.5 TABLET ORAL at 15:22

## 2023-06-03 RX ADMIN — BUSPIRONE HYDROCHLORIDE 15 MG: 7.5 TABLET ORAL at 21:46

## 2023-06-03 RX ADMIN — CARVEDILOL 6.25 MG: 6.25 TABLET, FILM COATED ORAL at 18:21

## 2023-06-03 RX ADMIN — CARVEDILOL 6.25 MG: 6.25 TABLET, FILM COATED ORAL at 09:58

## 2023-06-03 RX ADMIN — AMLODIPINE BESYLATE 5 MG: 5 TABLET ORAL at 09:58

## 2023-06-03 RX ADMIN — MULTIPLE VITAMINS W/ MINERALS TAB 1 TABLET: TAB at 09:53

## 2023-06-03 RX ADMIN — LISINOPRIL 20 MG: 20 TABLET ORAL at 09:58

## 2023-06-03 RX ADMIN — Medication 500 MCG: at 09:57

## 2023-06-03 RX ADMIN — IBUPROFEN 400 MG: 400 TABLET, FILM COATED ORAL at 11:18

## 2023-06-03 RX ADMIN — BUSPIRONE HYDROCHLORIDE 15 MG: 7.5 TABLET ORAL at 09:53

## 2023-06-03 RX ADMIN — DULOXETINE HYDROCHLORIDE 60 MG: 60 CAPSULE, DELAYED RELEASE ORAL at 09:53

## 2023-06-03 RX ADMIN — ATORVASTATIN CALCIUM 10 MG: 10 TABLET, FILM COATED ORAL at 21:46

## 2023-06-03 RX ADMIN — AMLODIPINE BESYLATE 5 MG: 5 TABLET ORAL at 21:46

## 2023-06-03 ASSESSMENT — PAIN DESCRIPTION - ORIENTATION: ORIENTATION: OTHER (COMMENT)

## 2023-06-03 ASSESSMENT — PAIN DESCRIPTION - LOCATION: LOCATION: BACK;LEG

## 2023-06-03 ASSESSMENT — PAIN SCALES - GENERAL: PAINLEVEL_OUTOF10: 5

## 2023-06-04 PROCEDURE — 1240000000 HC EMOTIONAL WELLNESS R&B

## 2023-06-04 PROCEDURE — 6370000000 HC RX 637 (ALT 250 FOR IP): Performed by: PSYCHIATRY & NEUROLOGY

## 2023-06-04 PROCEDURE — 6370000000 HC RX 637 (ALT 250 FOR IP)

## 2023-06-04 RX ADMIN — CALCIUM CARBONATE-VITAMIN D TAB 500 MG-200 UNIT 1 TABLET: 500-200 TAB at 08:59

## 2023-06-04 RX ADMIN — AMLODIPINE BESYLATE 5 MG: 5 TABLET ORAL at 08:59

## 2023-06-04 RX ADMIN — DULOXETINE HYDROCHLORIDE 60 MG: 60 CAPSULE, DELAYED RELEASE ORAL at 08:59

## 2023-06-04 RX ADMIN — AMLODIPINE BESYLATE 5 MG: 5 TABLET ORAL at 20:39

## 2023-06-04 RX ADMIN — LISINOPRIL 20 MG: 20 TABLET ORAL at 08:59

## 2023-06-04 RX ADMIN — CARVEDILOL 6.25 MG: 6.25 TABLET, FILM COATED ORAL at 18:05

## 2023-06-04 RX ADMIN — BUSPIRONE HYDROCHLORIDE 15 MG: 7.5 TABLET ORAL at 08:59

## 2023-06-04 RX ADMIN — ATORVASTATIN CALCIUM 10 MG: 10 TABLET, FILM COATED ORAL at 20:39

## 2023-06-04 RX ADMIN — Medication 500 MCG: at 08:59

## 2023-06-04 RX ADMIN — CARVEDILOL 6.25 MG: 6.25 TABLET, FILM COATED ORAL at 08:59

## 2023-06-04 RX ADMIN — MULTIPLE VITAMINS W/ MINERALS TAB 1 TABLET: TAB at 08:59

## 2023-06-04 RX ADMIN — IBUPROFEN 400 MG: 400 TABLET, FILM COATED ORAL at 07:14

## 2023-06-04 RX ADMIN — BUSPIRONE HYDROCHLORIDE 15 MG: 7.5 TABLET ORAL at 20:39

## 2023-06-04 RX ADMIN — BUSPIRONE HYDROCHLORIDE 15 MG: 7.5 TABLET ORAL at 15:45

## 2023-06-04 ASSESSMENT — PAIN DESCRIPTION - ORIENTATION: ORIENTATION: MID

## 2023-06-04 ASSESSMENT — PAIN SCALES - GENERAL
PAINLEVEL_OUTOF10: 5
PAINLEVEL_OUTOF10: 0

## 2023-06-04 ASSESSMENT — PAIN DESCRIPTION - DESCRIPTORS: DESCRIPTORS: THROBBING

## 2023-06-04 ASSESSMENT — PAIN DESCRIPTION - LOCATION: LOCATION: BACK

## 2023-06-05 PROCEDURE — 6370000000 HC RX 637 (ALT 250 FOR IP)

## 2023-06-05 PROCEDURE — 6370000000 HC RX 637 (ALT 250 FOR IP): Performed by: PSYCHIATRY & NEUROLOGY

## 2023-06-05 PROCEDURE — 99233 SBSQ HOSP IP/OBS HIGH 50: CPT | Performed by: PSYCHIATRY & NEUROLOGY

## 2023-06-05 PROCEDURE — 1240000000 HC EMOTIONAL WELLNESS R&B

## 2023-06-05 RX ADMIN — IBUPROFEN 400 MG: 400 TABLET, FILM COATED ORAL at 06:59

## 2023-06-05 RX ADMIN — DULOXETINE HYDROCHLORIDE 60 MG: 60 CAPSULE, DELAYED RELEASE ORAL at 09:40

## 2023-06-05 RX ADMIN — LISINOPRIL 20 MG: 20 TABLET ORAL at 09:42

## 2023-06-05 RX ADMIN — BUSPIRONE HYDROCHLORIDE 15 MG: 7.5 TABLET ORAL at 15:03

## 2023-06-05 RX ADMIN — AMLODIPINE BESYLATE 5 MG: 5 TABLET ORAL at 09:40

## 2023-06-05 RX ADMIN — CARVEDILOL 6.25 MG: 6.25 TABLET, FILM COATED ORAL at 17:05

## 2023-06-05 RX ADMIN — BUSPIRONE HYDROCHLORIDE 15 MG: 7.5 TABLET ORAL at 09:41

## 2023-06-05 RX ADMIN — AMLODIPINE BESYLATE 5 MG: 5 TABLET ORAL at 21:04

## 2023-06-05 RX ADMIN — CALCIUM CARBONATE-VITAMIN D TAB 500 MG-200 UNIT 1 TABLET: 500-200 TAB at 09:42

## 2023-06-05 RX ADMIN — MULTIPLE VITAMINS W/ MINERALS TAB 1 TABLET: TAB at 09:40

## 2023-06-05 RX ADMIN — CARVEDILOL 6.25 MG: 6.25 TABLET, FILM COATED ORAL at 09:41

## 2023-06-05 RX ADMIN — BUSPIRONE HYDROCHLORIDE 15 MG: 7.5 TABLET ORAL at 21:03

## 2023-06-05 RX ADMIN — Medication 500 MCG: at 09:40

## 2023-06-05 RX ADMIN — ATORVASTATIN CALCIUM 10 MG: 10 TABLET, FILM COATED ORAL at 21:04

## 2023-06-05 ASSESSMENT — PAIN - FUNCTIONAL ASSESSMENT: PAIN_FUNCTIONAL_ASSESSMENT: ACTIVITIES ARE NOT PREVENTED

## 2023-06-05 ASSESSMENT — PAIN SCALES - GENERAL
PAINLEVEL_OUTOF10: 0
PAINLEVEL_OUTOF10: 5

## 2023-06-05 ASSESSMENT — PAIN DESCRIPTION - DESCRIPTORS: DESCRIPTORS: POUNDING

## 2023-06-05 ASSESSMENT — PAIN DESCRIPTION - LOCATION: LOCATION: BACK;LEG

## 2023-06-05 NOTE — PLAN OF CARE
Problem: Risk for Elopement  Goal: Patient will not exit the unit/facility without proper excort  Outcome: Progressing     Problem: Depression/Self Harm  Goal: Effect of psychiatric condition will be minimized and patient will be protected from self harm  Description: INTERVENTIONS:  1. Assess impact of patient's symptoms on level of functioning, self care needs and offer support as indicated  2. Assess patient/family knowledge of depression, impact on illness and need for teaching  3. Provide emotional support, presence and reassurance  4. Assess for possible suicidal thoughts or ideation. If patient expresses suicidal thoughts or statements do not leave alone, initiate Suicide Precautions, move to a room close to the nursing station and obtain sitter  5. Initiate consults as appropriate with Mental Health Professional, Spiritual Care, Psychosocial CNS, and consider a recommendation to the LIP for a Psychiatric Consultation  Outcome: Progressing    Pt has been visible on the unit watching tv. She denies SI/HI/AVH and no RTIS noted. Pt does express anxiety but is improved.  She was medication compliant

## 2023-06-05 NOTE — DISCHARGE INSTRUCTIONS
Specialist   Agency name: [facility]  Address:  [address]  Phone Number: [phone]  Other: [address] is a locally owned and operated senior placement agency. They will help you identify your long-term care and housing goals and offer a wide variety of resources to best fit your needs. This is a free service offered for seniors. Good Erasmo Driving Test   A physician's order has been submitted to [facility] for a 's evaluation. Please see more information below about the location & process. Who: [address] [place] [address] Street  Location: [address]. [place] [address] [facility] [place] [facility]  Scheduling Phone: [phone]    In-Clinic Test  A [facility] licensed Occupational Therapist conducts the in-clinic portion of the test. The therapist evaluates the following areas:    Upper and lower extremity motor function  Functional visual abilities  Perception (processing what your eye sees)  Cognition (choosing how to respond to what the eye sees)  Brake reaction time    Behind-the-wheel Evaluation:  Depending on the results of the in-clinic evaluation, a specially trained  school may perform behind the- wheel testing. Discharge Completed By: MAYLIN Kilpatrick  Fax to:   [facility]: Has EPIC access, no fax needed. The following personal items were collected during your admission and were returned to you:    Belongings  Dental Appliances: None  Vision - Corrective Lenses: Eyeglasses  Hearing Aid: Bilateral hearing aids  Clothing: Dione Lei, At bedside ((x1) pajamas, (x2) pants, (x1) slippers)  Jewelry: Ring  Body Piercings Removed: N/A  Electronic Devices:  (hearing aide)  Weapons (Notify Protective Services/Security): None  Other Valuables: Other (Comment) (none)  Home Medications: None  Valuables Given To:  Other (Comment) (n/a)  Provide Name(s) of Who Valuable(s) Were

## 2023-06-05 NOTE — BH NOTE
Spoke with pt's daughter Viktoriya Pickering and daughter-in-law Vimal Schrader regarding after-care needs. They plan to help pt move to an independent living/assisted living apartment where she will have more supervision and support while still retaining as much independence as possible. They do not feel like it is a good idea for pt to just return to her condo where she has been failing to thrive. The family will provide support for pt during the transition from hospital DC to her move. They will be able to ensure someone is with pt 24 hrs until she moves. They are also concerned about her ability to drive post DC. Discussed the driving evaluation available at [facility] with them, and will request the provider puts in an out-pt OT/PT order for this. Spoke with pt about after-care arrangements. She has seen a counselor recently, but did not feel like it was very helpful. She is also not sure if she wants to move to an independent/assisted living facility. She stated she has had multiple transitions in the past year and does not want to move again. She feels like what she needs the most is a period of stability. She is also concerned about the cost of living in that kind of place and is afraid she will run out of money. Encouraged pt to not worry about making any decisions now, and instead, wait until she gets home and discuss the situation with her family. 16:40 - Spoke with pt's daughter-in-law Vimal Schrader and updated her on plan to DC pt tomorrow. Pt's son Sami Christensen will pick her up around 15:00 tomorrow. Referral submitted to [id number] for help with finding Independent/assisted living facility once pt is discharged.

## 2023-06-05 NOTE — PLAN OF CARE
Problem: Risk for Elopement  Goal: Patient will not exit the unit/facility without proper excort  6/4/2023 2308 by Leigh Figueroa LPN  Outcome: Progressing     Problem: Safety - Adult  Goal: Free from fall injury  Outcome: Progressing   Patient alert and oriented. Visible on unit. Alert and oriented x 4. Med compliant. Anxious about going home. Patient friendly and cooperative. Denies all.

## 2023-06-06 VITALS
SYSTOLIC BLOOD PRESSURE: 126 MMHG | HEART RATE: 87 BPM | TEMPERATURE: 98.4 F | DIASTOLIC BLOOD PRESSURE: 75 MMHG | WEIGHT: 126 LBS | HEIGHT: 68 IN | OXYGEN SATURATION: 97 % | BODY MASS INDEX: 19.1 KG/M2 | RESPIRATION RATE: 16 BRPM

## 2023-06-06 PROCEDURE — 6370000000 HC RX 637 (ALT 250 FOR IP): Performed by: PSYCHIATRY & NEUROLOGY

## 2023-06-06 RX ORDER — BUSPIRONE HYDROCHLORIDE 15 MG/1
15 TABLET ORAL 3 TIMES DAILY
Qty: 90 TABLET | Refills: 0 | Status: SHIPPED | OUTPATIENT
Start: 2023-06-06 | End: 2023-07-06

## 2023-06-06 RX ORDER — DULOXETIN HYDROCHLORIDE 60 MG/1
60 CAPSULE, DELAYED RELEASE ORAL DAILY
Qty: 30 CAPSULE | Refills: 0 | Status: SHIPPED | OUTPATIENT
Start: 2023-06-07

## 2023-06-06 RX ORDER — CALCIUM CARBONATE-CHOLECALCIFEROL TAB 250 MG-125 UNIT 250-125 MG-UNIT
2 TAB ORAL DAILY
Status: DISCONTINUED | OUTPATIENT
Start: 2023-06-06 | End: 2023-06-06 | Stop reason: HOSPADM

## 2023-06-06 RX ADMIN — Medication 500 MCG: at 08:29

## 2023-06-06 RX ADMIN — MULTIPLE VITAMINS W/ MINERALS TAB 1 TABLET: TAB at 08:29

## 2023-06-06 RX ADMIN — CARVEDILOL 6.25 MG: 6.25 TABLET, FILM COATED ORAL at 08:29

## 2023-06-06 RX ADMIN — DULOXETINE HYDROCHLORIDE 60 MG: 60 CAPSULE, DELAYED RELEASE ORAL at 08:29

## 2023-06-06 RX ADMIN — BUSPIRONE HYDROCHLORIDE 15 MG: 7.5 TABLET ORAL at 14:15

## 2023-06-06 RX ADMIN — AMLODIPINE BESYLATE 5 MG: 5 TABLET ORAL at 08:35

## 2023-06-06 RX ADMIN — BUSPIRONE HYDROCHLORIDE 15 MG: 7.5 TABLET ORAL at 08:29

## 2023-06-06 RX ADMIN — IBUPROFEN 400 MG: 400 TABLET, FILM COATED ORAL at 06:59

## 2023-06-06 RX ADMIN — LISINOPRIL 20 MG: 20 TABLET ORAL at 08:29

## 2023-06-06 ASSESSMENT — PAIN SCALES - GENERAL
PAINLEVEL_OUTOF10: 8
PAINLEVEL_OUTOF10: 0

## 2023-06-06 ASSESSMENT — PAIN DESCRIPTION - ORIENTATION: ORIENTATION: LOWER

## 2023-06-06 ASSESSMENT — PAIN DESCRIPTION - DESCRIPTORS: DESCRIPTORS: ACHING;DISCOMFORT

## 2023-06-06 ASSESSMENT — PAIN DESCRIPTION - LOCATION: LOCATION: BACK

## 2023-06-06 NOTE — PROGRESS NOTES
Bridge Appointment completed: Reviewed Discharge Instructions with patient. Patient verbalizes understanding and agreement with the discharge plan using the teachback method.      Referral for Outpatient Tobacco Cessation Counseling, upon discharge (carin X if applicable and completed):    ( )  Hospital staff assisted patient to call Quit Line or faxed referral                                   during hospitalization                  ( )  Recognizing danger situations (included triggers and roadblocks), if not completed on admission                    ( )  Coping skills (new ways to manage stress, exercise, relaxation techniques, changing routine, distraction), if not completed on admission                                                           ( )  Basic information about quitting (benefits of quitting, techniques in how to quit, available resources, if not completed on admission  ( ) Referral for counseling faxed to [facility]   ( ) Patient refused referral  ( ) Patient refused counseling  (X ) Patient refused smoking cessation medication upon discharge    Vaccinations (carin X if applicable and completed):  ( ) Patient states already received influenza vaccine elsewhere  ( ) Patient received influenza vaccine during this hospitalization  ( X) Patient refused influenza vaccine at this time
Group Therapy Note    Date: 6/6/2023  Start Time: 1000  End Time:  1100  Number of Participants: 4    Type of Group: Music Group    Notes:  Pt present for Music Group. Pt actively participated by making song selections and singing along to music. Participation Level:  Active Listener and Interactive    Participation Quality: Appropriate and Attentive      Speech:  normal      Affective Functioning: Congruent      Endings: None Reported    Modes of Intervention: Support, Socialization, Activity, and Media      Discipline Responsible: Chaplain Cheryl Friend       06/06/23 2258   Encounter Summary   Encounter Overview/Reason  Behavioral Health   Service Provided For: Patient   Last Encounter    (6/6 Music Group)   Complexity of Encounter Moderate   Begin Time 1000   End Time  1100   Total Time Calculated 60 min   Behavioral Health    Type  Spirituality Group
Pt discharged at 1545. All belongings given to pt. AVS explained and all questions answered. [address]  Discharge Note    Pt discharged with followings belongings:   Dental Appliances: None  Vision - Corrective Lenses: Eyeglasses  Hearing Aid: Bilateral hearing aids  Jewelry: Ring  Body Piercings Removed: N/A  Clothing: Pajamas, Pants, Slippers, At bedside ((x1) pajamas, (x2) pants, (x1) slippers)  Other Valuables: Other (Comment) (none)   Valuables sent home with pt. Valuables retrieved from safe, Security envelope number:  na and returned to patient. Patient education on aftercare instructions: yes  Patient verbalize understanding of AVS:  yes. Status EXAM upon discharge:  Mental Status and Behavioral Exam  Normal: No  Level of Assistance: Independent/Self  Facial Expression: Worried  Affect: Stable  Level of Consciousness: Alert  Frequency of Checks: 4 times per hour, close  Mood:Normal: No  Mood: Anxious  Motor Activity:Normal: Yes  Motor Activity: Decreased  Eye Contact: Good  Observed Behavior: Cooperative  Sexual Misconduct History: Current - no  Preception: [place] to person, [place] to time, [place] to place, [place] to situation  Attention:Normal: Yes  Attention: Distractible  Thought Processes: Perseveration  Thought Content:Normal: Yes  Thought Content: Preoccupations  Depression Symptoms: Impaired concentration  Anxiety Symptoms: Generalized  Amee Symptoms: No problems reported or observed.   Hallucinations: None  Delusions: No  Memory:Normal: No  Memory: Poor recent  Insight and Judgment: No  Insight and Judgment: Poor judgment, Poor insight      Metabolic Screening:    Lab Results   Component Value Date    LABA1C 5.6 05/27/2023       Lab Results   Component Value Date    CHOL 157 05/27/2023    CHOL 176 08/24/2022    CHOL 183 10/20/2021    CHOL 186 10/19/2020    CHOL 202 (H) 06/04/2019    CHOL 206 (H) 04/07/2017    CHOL 198 04/01/2016    CHOL 194 02/24/2015    CHOL 196 08/19/2014
Pt is currently sitting in the day room.  Denies needs currently
Total time with patient was 50 minutes and more than 50 % of that time was spent counseling the patient on their symptoms, treatment, and expected goals.      Heena Bain MD

## 2023-06-06 NOTE — PLAN OF CARE
Dennis Lynn has been out watching TV all evening. She is pleasant and cooperative on approach. Affect is flat but she animates some in conversation. Underlying anxiety. She is planning on discharge tomorrow. Denies SI/HI/AVH. She is requesting that prescriptions be filled before she leaves because she won't have time to get to the pharmacy.

## 2023-06-06 NOTE — PLAN OF CARE
Problem: Risk for Elopement  Goal: Patient will not exit the unit/facility without proper excort  6/6/2023 1106 by Charly Potts, RN  Outcome: Progressing     Problem: Safety - Adult  Goal: Free from fall injury  6/6/2023 1042 by Charly Potts, RN  Outcome: Progressing    Pt has been visible on the unit. She denies SI/HI/AVH and no RTIS noted. She was medication compliant. Pt anxious about discharge but does feel ready to discharge.  Denies needs/pain currently

## 2023-06-07 ENCOUNTER — FOLLOWUP TELEPHONE ENCOUNTER (OUTPATIENT)
Dept: PSYCHIATRY | Age: 73
End: 2023-06-07

## 2023-06-16 ENCOUNTER — TELEPHONE (OUTPATIENT)
Dept: INTERNAL MEDICINE CLINIC | Age: 73
End: 2023-06-16

## 2023-06-16 DIAGNOSIS — F41.1 GENERALIZED ANXIETY DISORDER: ICD-10-CM

## 2023-06-16 DIAGNOSIS — Z09 HOSPITAL DISCHARGE FOLLOW-UP: ICD-10-CM

## 2023-06-16 DIAGNOSIS — E87.1 HYPONATREMIA: ICD-10-CM

## 2023-06-16 LAB
ANION GAP SERPL CALCULATED.3IONS-SCNC: 10 MMOL/L (ref 3–16)
BUN SERPL-MCNC: 25 MG/DL (ref 7–20)
CALCIUM SERPL-MCNC: 9.9 MG/DL (ref 8.3–10.6)
CHLORIDE SERPL-SCNC: 101 MMOL/L (ref 99–110)
CO2 SERPL-SCNC: 26 MMOL/L (ref 21–32)
CREAT SERPL-MCNC: 0.6 MG/DL (ref 0.6–1.2)
GFR SERPLBLD CREATININE-BSD FMLA CKD-EPI: >60 ML/MIN/{1.73_M2}
GLUCOSE SERPL-MCNC: 105 MG/DL (ref 70–99)
POTASSIUM SERPL-SCNC: 4.2 MMOL/L (ref 3.5–5.1)
SODIUM SERPL-SCNC: 137 MMOL/L (ref 136–145)

## 2023-06-19 NOTE — TELEPHONE ENCOUNTER
*Pts. Daughter in law Dioni Dsouza is not in the communication form. Called pt. She is concerned of the cost with home health / PT. Pt. Gave verbal consent to speak with Dioni Dsouza. She did say she would agree to home health if it is not too costly.
Once family helps her determine her coverage / cost and she is agreeable then I will place an order for [facility] agency of pt choice, we will need agency information when she is ready. She may need another appt to discuss in person to make sure things are ordered / addressed appropriately. I recommend family come to appts if they have concerns.  Sachin Pinzon
She will need a FTF to discuss [address] - she reported she was getting around and getting strength back on her own and feeling much better. If family has concerns they should really come to appts with pt so we can make sure everything addressed.  Marcial Caceres
show

## 2023-06-20 ENCOUNTER — OFFICE VISIT (OUTPATIENT)
Dept: PSYCHOLOGY | Age: 73
End: 2023-06-20
Payer: MEDICARE

## 2023-06-20 DIAGNOSIS — F33.2 SEVERE EPISODE OF RECURRENT MAJOR DEPRESSIVE DISORDER, WITHOUT PSYCHOTIC FEATURES (HCC): Primary | ICD-10-CM

## 2023-06-20 PROCEDURE — 90832 PSYTX W PT 30 MINUTES: CPT | Performed by: PSYCHOLOGIST

## 2023-06-20 PROCEDURE — 1123F ACP DISCUSS/DSCN MKR DOCD: CPT | Performed by: PSYCHOLOGIST

## 2023-06-20 ASSESSMENT — ANXIETY QUESTIONNAIRES
6. BECOMING EASILY ANNOYED OR IRRITABLE: 1
2. NOT BEING ABLE TO STOP OR CONTROL WORRYING: 0
7. FEELING AFRAID AS IF SOMETHING AWFUL MIGHT HAPPEN: 0
1. FEELING NERVOUS, ANXIOUS, OR ON EDGE: 1
GAD7 TOTAL SCORE: 3
4. TROUBLE RELAXING: 0
3. WORRYING TOO MUCH ABOUT DIFFERENT THINGS: 1
5. BEING SO RESTLESS THAT IT IS HARD TO SIT STILL: 0
IF YOU CHECKED OFF ANY PROBLEMS ON THIS QUESTIONNAIRE, HOW DIFFICULT HAVE THESE PROBLEMS MADE IT FOR YOU TO DO YOUR WORK, TAKE CARE OF THINGS AT HOME, OR GET ALONG WITH OTHER PEOPLE: NOT DIFFICULT AT ALL

## 2023-06-20 ASSESSMENT — PATIENT HEALTH QUESTIONNAIRE - PHQ9
SUM OF ALL RESPONSES TO PHQ QUESTIONS 1-9: 6
2. FEELING DOWN, DEPRESSED OR HOPELESS: 1
SUM OF ALL RESPONSES TO PHQ QUESTIONS 1-9: 6
7. TROUBLE CONCENTRATING ON THINGS, SUCH AS READING THE NEWSPAPER OR WATCHING TELEVISION: 1
9. THOUGHTS THAT YOU WOULD BE BETTER OFF DEAD, OR OF HURTING YOURSELF: 0
10. IF YOU CHECKED OFF ANY PROBLEMS, HOW DIFFICULT HAVE THESE PROBLEMS MADE IT FOR YOU TO DO YOUR WORK, TAKE CARE OF THINGS AT HOME, OR GET ALONG WITH OTHER PEOPLE: 0
4. FEELING TIRED OR HAVING LITTLE ENERGY: 1
5. POOR APPETITE OR OVEREATING: 0
SUM OF ALL RESPONSES TO PHQ9 QUESTIONS 1 & 2: 1
SUM OF ALL RESPONSES TO PHQ QUESTIONS 1-9: 6
1. LITTLE INTEREST OR PLEASURE IN DOING THINGS: 0
8. MOVING OR SPEAKING SO SLOWLY THAT OTHER PEOPLE COULD HAVE NOTICED. OR THE OPPOSITE, BEING SO FIGETY OR RESTLESS THAT YOU HAVE BEEN MOVING AROUND A LOT MORE THAN USUAL: 1
3. TROUBLE FALLING OR STAYING ASLEEP: 1
SUM OF ALL RESPONSES TO PHQ QUESTIONS 1-9: 6
6. FEELING BAD ABOUT YOURSELF - OR THAT YOU ARE A FAILURE OR HAVE LET YOURSELF OR YOUR FAMILY DOWN: 1

## 2023-07-23 SDOH — HEALTH STABILITY: PHYSICAL HEALTH: ON AVERAGE, HOW MANY DAYS PER WEEK DO YOU ENGAGE IN MODERATE TO STRENUOUS EXERCISE (LIKE A BRISK WALK)?: 3 DAYS

## 2023-07-23 SDOH — HEALTH STABILITY: PHYSICAL HEALTH: ON AVERAGE, HOW MANY MINUTES DO YOU ENGAGE IN EXERCISE AT THIS LEVEL?: 20 MIN

## 2023-07-23 ASSESSMENT — LIFESTYLE VARIABLES
HOW OFTEN DO YOU HAVE A DRINK CONTAINING ALCOHOL: MONTHLY OR LESS
HOW MANY STANDARD DRINKS CONTAINING ALCOHOL DO YOU HAVE ON A TYPICAL DAY: 1 OR 2
HOW OFTEN DO YOU HAVE A DRINK CONTAINING ALCOHOL: 2
HOW MANY STANDARD DRINKS CONTAINING ALCOHOL DO YOU HAVE ON A TYPICAL DAY: 1
HOW OFTEN DO YOU HAVE SIX OR MORE DRINKS ON ONE OCCASION: 1

## 2023-07-23 ASSESSMENT — PATIENT HEALTH QUESTIONNAIRE - PHQ9
SUM OF ALL RESPONSES TO PHQ QUESTIONS 1-9: 0
SUM OF ALL RESPONSES TO PHQ9 QUESTIONS 1 & 2: 0
SUM OF ALL RESPONSES TO PHQ QUESTIONS 1-9: 0
2. FEELING DOWN, DEPRESSED OR HOPELESS: 0
1. LITTLE INTEREST OR PLEASURE IN DOING THINGS: 0

## 2023-07-24 ENCOUNTER — OFFICE VISIT (OUTPATIENT)
Dept: PSYCHOLOGY | Age: 73
End: 2023-07-24
Payer: MEDICARE

## 2023-07-24 ENCOUNTER — OFFICE VISIT (OUTPATIENT)
Dept: INTERNAL MEDICINE CLINIC | Age: 73
End: 2023-07-24
Payer: MEDICARE

## 2023-07-24 VITALS
SYSTOLIC BLOOD PRESSURE: 130 MMHG | WEIGHT: 134 LBS | HEART RATE: 79 BPM | BODY MASS INDEX: 20.37 KG/M2 | DIASTOLIC BLOOD PRESSURE: 80 MMHG | OXYGEN SATURATION: 97 %

## 2023-07-24 DIAGNOSIS — R31.9 HEMATURIA, UNSPECIFIED TYPE: ICD-10-CM

## 2023-07-24 DIAGNOSIS — E78.5 HYPERLIPIDEMIA, UNSPECIFIED HYPERLIPIDEMIA TYPE: ICD-10-CM

## 2023-07-24 DIAGNOSIS — R35.0 URINARY FREQUENCY: Primary | ICD-10-CM

## 2023-07-24 DIAGNOSIS — G47.00 INSOMNIA, UNSPECIFIED TYPE: ICD-10-CM

## 2023-07-24 DIAGNOSIS — R82.998 URINE LEUKOCYTES: ICD-10-CM

## 2023-07-24 DIAGNOSIS — Z91.81 AT HIGH RISK FOR FALLS: ICD-10-CM

## 2023-07-24 DIAGNOSIS — F41.1 GENERALIZED ANXIETY DISORDER: ICD-10-CM

## 2023-07-24 DIAGNOSIS — I10 ESSENTIAL HYPERTENSION, BENIGN: ICD-10-CM

## 2023-07-24 DIAGNOSIS — F33.2 SEVERE EPISODE OF RECURRENT MAJOR DEPRESSIVE DISORDER, WITHOUT PSYCHOTIC FEATURES (HCC): ICD-10-CM

## 2023-07-24 DIAGNOSIS — M81.0 AGE-RELATED OSTEOPOROSIS WITHOUT CURRENT PATHOLOGICAL FRACTURE: ICD-10-CM

## 2023-07-24 DIAGNOSIS — F33.1 MODERATE EPISODE OF RECURRENT MAJOR DEPRESSIVE DISORDER (HCC): Primary | ICD-10-CM

## 2023-07-24 PROBLEM — S72.401A CLOSED FRACTURE OF DISTAL END OF RIGHT FEMUR, UNSPECIFIED FRACTURE MORPHOLOGY, INITIAL ENCOUNTER (HCC): Status: RESOLVED | Noted: 2022-09-19 | Resolved: 2023-07-24

## 2023-07-24 LAB
BILIRUBIN, POC: NORMAL
BLOOD URINE, POC: NORMAL
CLARITY, POC: CLEAR
COLOR, POC: YELLOW
GLUCOSE URINE, POC: NORMAL
KETONES, POC: NORMAL
LEUKOCYTE EST, POC: NORMAL
NITRITE, POC: NORMAL
PH, POC: 7
PROTEIN, POC: NORMAL
SPECIFIC GRAVITY, POC: 1.02
UROBILINOGEN, POC: 0.2

## 2023-07-24 PROCEDURE — 99214 OFFICE O/P EST MOD 30 MIN: CPT | Performed by: NURSE PRACTITIONER

## 2023-07-24 PROCEDURE — 3079F DIAST BP 80-89 MM HG: CPT | Performed by: NURSE PRACTITIONER

## 2023-07-24 PROCEDURE — 90832 PSYTX W PT 30 MINUTES: CPT | Performed by: PSYCHOLOGIST

## 2023-07-24 PROCEDURE — 1123F ACP DISCUSS/DSCN MKR DOCD: CPT | Performed by: PSYCHOLOGIST

## 2023-07-24 PROCEDURE — 81002 URINALYSIS NONAUTO W/O SCOPE: CPT | Performed by: NURSE PRACTITIONER

## 2023-07-24 PROCEDURE — 3075F SYST BP GE 130 - 139MM HG: CPT | Performed by: NURSE PRACTITIONER

## 2023-07-24 PROCEDURE — 1123F ACP DISCUSS/DSCN MKR DOCD: CPT | Performed by: NURSE PRACTITIONER

## 2023-07-24 NOTE — ASSESSMENT & PLAN NOTE
Chronic, controlled. Continue seeing Dr. Stan Waddell as recommended.   Continue duloxetine 60 mg daily, buspirone 15 mg TID and trazodone 50 mg nightly as needed for sleep/anxiety

## 2023-07-24 NOTE — ASSESSMENT & PLAN NOTE
Chronic, controlled. Doing well on prolia.  Would like to  at pharmacy and have her daughter administer it to save on cost.

## 2023-07-24 NOTE — PROGRESS NOTES
Behavioral Health Consultation  Randi Sanders, Ph.D.  Psychologist  7/24/2023  2:02 PM      Time spent with Patient: 28 minutes  This is patient's third  [facility] appointment. Reason for Consult:    Chief Complaint   Patient presents with    Depression       Feedback given to PCP. S:  Pt seen for f/u of depression. Pt reported entirely resolved mood and sxs. Stated she is eating perfectly fine and talked about her food cognitions when asked. Discussed hx w eating disorder. Stated she read a diet book as a teen and was very focused on weight loss. Stated no one ever told her she was overweight, but she felt overweight. \"I lost too much and I just maintained it. \" Reviewed history w disordered eating and how much thought she has put into eating \"healthy. \"    Stated she no longer feels lonely at [facility], has engaged in the activities. Will be selling her home in order to stay at [facility]. Agreed to tapered f/u.     O:  MSE:  Appearance    alert, cooperative  Appetite abnormal: see above  Sleep disturbance No  Fatigue No  Loss of pleasure No  Impulsive behavior No  Speech    spontaneous, normal rate, normal volume, and well articulated  Mood    Depressed  Affect    depressed affect  Thought Content    intact and cognitive distortions  Thought Process    linear, goal directed, and coherent  Associations    logical connections  Insight    Fair  Judgment    Intact  Orientation    oriented to person, place, time, and general circumstances  Memory    recent and remote memory intact  Attention/Concentration    intact  Morbid ideation No  Suicide Assessment    no suicidal ideation    History:  Social History:   Social History     Socioeconomic History    Marital status:       Spouse name: Not on file    Number of children: Not on file    Years of education: Not on file    Highest education level: Not on file   Occupational History    Not on file   Tobacco Use    Smoking status: Never    Smokeless tobacco:

## 2023-07-24 NOTE — PROGRESS NOTES
7/24/23     Chief Complaint   Patient presents with    6 Month Follow-Up     Doing well - no concerns. HPI    Here for 6 month follow up   Doing well on current medications  Seeing Dr. Lynsey Cardenas and going well. Feels like her mood has improved   Weight is up a few lbs. Prolia - would like to pick it up at the pharmacy. It was last given in January    Had some urinary frequency earlier this week. Denies fever, chills, dysuria or hematuria. She is feeling better but not sure if it still having symptoms. Allergies   Allergen Reactions    Hydrochlorothiazide      hyponatremia       Current Outpatient Medications   Medication Sig Dispense Refill    denosumab (PROLIA) 60 MG/ML SOSY SC injection Inject 1 mL into the skin every 6 months 1 mL 1    DULoxetine (CYMBALTA) 60 MG extended release capsule Take 1 capsule by mouth daily 90 capsule 1    busPIRone (BUSPAR) 15 MG tablet Take 15 mg by mouth 3 times daily 270 tablet 1    traZODone (DESYREL) 50 MG tablet Take 1 tablet by mouth nightly as needed for Sleep 90 tablet 1    lisinopril (PRINIVIL;ZESTRIL) 20 MG tablet Take 1 tablet by mouth daily 90 tablet 1    amLODIPine (NORVASC) 5 MG tablet TAKE 1 TABLET BY MOUTH IN THE MORNING AND AT BEDTIME 180 tablet 1    carvedilol (COREG) 6.25 MG tablet TAKE 1 TABLET BY MOUTH IN THE MORNING AND AT BEDTIME 180 tablet 1    lovastatin (MEVACOR) 20 MG tablet Take 1 tablet by mouth nightly 90 tablet 3    vitamin B-12 (CYANOCOBALAMIN) 500 MCG tablet Take 1 tablet by mouth daily      Calcium Carbonate-Vitamin D (CALCIUM + D PO) Take 1 tablet by mouth daily       Glucosamine-Chondroit-Vit C-Mn (GLUCOSAMINE CHONDR 1500 COMPLX) CAPS Take 1 tablet by mouth daily       Multiple Vitamins-Minerals (ONE-A-DAY WOMENS 50 PLUS PO) Take 1 tablet by mouth daily. Loratadine (CLARITIN PO) Take 1 tablet by mouth daily.       Cranberry 1000 MG CAPS Take 1 tablet by mouth daily       niacin 250 MG tablet Take 1 tablet by mouth daily (with

## 2023-07-26 LAB
BACTERIA UR CULT: ABNORMAL
ORGANISM: ABNORMAL

## 2023-07-26 RX ORDER — NITROFURANTOIN 25; 75 MG/1; MG/1
100 CAPSULE ORAL 2 TIMES DAILY
Qty: 10 CAPSULE | Refills: 0 | Status: SHIPPED | OUTPATIENT
Start: 2023-07-26 | End: 2023-07-31

## 2023-09-18 ENCOUNTER — OFFICE VISIT (OUTPATIENT)
Dept: ORTHOPEDIC SURGERY | Age: 73
End: 2023-09-18
Payer: MEDICARE

## 2023-09-18 VITALS — HEIGHT: 68 IN | WEIGHT: 134 LBS | BODY MASS INDEX: 20.31 KG/M2

## 2023-09-18 DIAGNOSIS — Z47.89 AFTERCARE FOLLOWING SURGERY OF THE MUSCULOSKELETAL SYSTEM: Primary | ICD-10-CM

## 2023-09-18 PROCEDURE — 1123F ACP DISCUSS/DSCN MKR DOCD: CPT | Performed by: ORTHOPAEDIC SURGERY

## 2023-09-18 PROCEDURE — 99213 OFFICE O/P EST LOW 20 MIN: CPT | Performed by: ORTHOPAEDIC SURGERY

## 2023-09-18 NOTE — PROGRESS NOTES
Patient: Marni Henao                  : 1950   MRN: [id number]   Date of Visit: 23     Physician: Darline Lamas MD.     Reason for Visit: Status post right distal femur open reduction internal fixation    Subjective History of Present Illness: Marni Henao is here for regularly scheduled follow-up s/p right distal femur open reduction internal fixation. She is doing well pain is relatively well controlled she is able to tolerate the knee at this point without significant discomfort. Physical Examination??: ?   General: Patient is alert and oriented x 3 and appears comfortable. Well-healed anterior and lateral incisions no evidence of erythema or drainage. Sensation is intact to light touch there is some lateral numbness over the knee. I am unable to recreate the clicking sensation however there somemid flexion laxity with a firm endpoint and slight  hyperextension. She does hyperextend 5 degrees and there is laxity in mid flexion and lift off and flexion. Radiographs: No new images    Assessment and Plan?: The patient is progressing well approximately 12 months s/p ORIF of the right distal femur     1. A thorough discussion was had with the patient concerning the ?postoperative course and the patient is in agreement with the plan. 2.  I think she has some instability in the knee this is a combination of her initial knee replacement as well as the soft tissue changes that have happened with shortening of her right lower extremity. 3. I did discuss with her revision knee replacement and I do think we would have to remove the femoral nail as it was slightly prominent after her prior surgery. I believe revision to a upsize polyethylene would provide her stability throughout the arc of motion.     Spoke with the patient regarding the use of over-the-counter medications both oral and topical medications for pain control.      _______________________      Darline Lamas MD

## 2023-10-19 DIAGNOSIS — I10 ESSENTIAL HYPERTENSION, BENIGN: ICD-10-CM

## 2023-10-19 DIAGNOSIS — E78.5 HYPERLIPIDEMIA, UNSPECIFIED HYPERLIPIDEMIA TYPE: ICD-10-CM

## 2023-10-20 RX ORDER — LOVASTATIN 20 MG/1
20 TABLET ORAL NIGHTLY
Qty: 90 TABLET | Refills: 0 | Status: SHIPPED | OUTPATIENT
Start: 2023-10-20

## 2023-10-20 RX ORDER — AMLODIPINE BESYLATE 5 MG/1
5 TABLET ORAL 2 TIMES DAILY
Qty: 180 TABLET | Refills: 0 | Status: SHIPPED | OUTPATIENT
Start: 2023-10-20

## 2023-10-20 RX ORDER — LISINOPRIL 20 MG/1
20 TABLET ORAL DAILY
Qty: 90 TABLET | Refills: 0 | Status: SHIPPED | OUTPATIENT
Start: 2023-10-20

## 2023-10-20 RX ORDER — CARVEDILOL 6.25 MG/1
6.25 TABLET ORAL 2 TIMES DAILY
Qty: 180 TABLET | Refills: 0 | Status: SHIPPED | OUTPATIENT
Start: 2023-10-20

## 2023-10-20 NOTE — TELEPHONE ENCOUNTER
Last OV: 7/24/2023  Next OV: 12/4/2023    Next appointment due:around 11/24/2023    Last fill:8/22/22  Refills:3 # 80

## 2023-12-04 ENCOUNTER — OFFICE VISIT (OUTPATIENT)
Dept: PSYCHOLOGY | Age: 73
End: 2023-12-04

## 2023-12-04 ENCOUNTER — OFFICE VISIT (OUTPATIENT)
Dept: INTERNAL MEDICINE CLINIC | Age: 73
End: 2023-12-04
Payer: MEDICARE

## 2023-12-04 VITALS
HEART RATE: 81 BPM | WEIGHT: 133 LBS | BODY MASS INDEX: 20.22 KG/M2 | SYSTOLIC BLOOD PRESSURE: 124 MMHG | OXYGEN SATURATION: 98 % | DIASTOLIC BLOOD PRESSURE: 68 MMHG

## 2023-12-04 DIAGNOSIS — S46.911A STRAIN OF RIGHT SHOULDER, INITIAL ENCOUNTER: ICD-10-CM

## 2023-12-04 DIAGNOSIS — M81.0 AGE-RELATED OSTEOPOROSIS WITHOUT CURRENT PATHOLOGICAL FRACTURE: ICD-10-CM

## 2023-12-04 DIAGNOSIS — I10 ESSENTIAL HYPERTENSION, BENIGN: ICD-10-CM

## 2023-12-04 DIAGNOSIS — G47.00 INSOMNIA, UNSPECIFIED TYPE: ICD-10-CM

## 2023-12-04 DIAGNOSIS — F33.2 SEVERE EPISODE OF RECURRENT MAJOR DEPRESSIVE DISORDER, WITHOUT PSYCHOTIC FEATURES (HCC): ICD-10-CM

## 2023-12-04 DIAGNOSIS — F33.42 RECURRENT MAJOR DEPRESSIVE DISORDER, IN FULL REMISSION (HCC): Primary | ICD-10-CM

## 2023-12-04 DIAGNOSIS — E78.5 HYPERLIPIDEMIA, UNSPECIFIED HYPERLIPIDEMIA TYPE: ICD-10-CM

## 2023-12-04 DIAGNOSIS — F41.1 GENERALIZED ANXIETY DISORDER: ICD-10-CM

## 2023-12-04 DIAGNOSIS — Z00.00 MEDICARE ANNUAL WELLNESS VISIT, SUBSEQUENT: Primary | ICD-10-CM

## 2023-12-04 PROBLEM — R45.851 SUICIDAL IDEATION: Status: RESOLVED | Noted: 2023-05-26 | Resolved: 2023-12-04

## 2023-12-04 PROCEDURE — G0439 PPPS, SUBSEQ VISIT: HCPCS | Performed by: NURSE PRACTITIONER

## 2023-12-04 PROCEDURE — 3078F DIAST BP <80 MM HG: CPT | Performed by: NURSE PRACTITIONER

## 2023-12-04 PROCEDURE — 99214 OFFICE O/P EST MOD 30 MIN: CPT | Performed by: NURSE PRACTITIONER

## 2023-12-04 PROCEDURE — 1123F ACP DISCUSS/DSCN MKR DOCD: CPT | Performed by: NURSE PRACTITIONER

## 2023-12-04 PROCEDURE — 3074F SYST BP LT 130 MM HG: CPT | Performed by: NURSE PRACTITIONER

## 2023-12-04 RX ORDER — DULOXETIN HYDROCHLORIDE 60 MG/1
60 CAPSULE, DELAYED RELEASE ORAL DAILY
Qty: 90 CAPSULE | Refills: 1 | Status: SHIPPED | OUTPATIENT
Start: 2023-12-04

## 2023-12-04 RX ORDER — LISINOPRIL 20 MG/1
20 TABLET ORAL DAILY
Qty: 90 TABLET | Refills: 1 | Status: SHIPPED | OUTPATIENT
Start: 2023-12-04

## 2023-12-04 RX ORDER — CARVEDILOL 6.25 MG/1
6.25 TABLET ORAL 2 TIMES DAILY
Qty: 180 TABLET | Refills: 1 | Status: SHIPPED | OUTPATIENT
Start: 2023-12-04

## 2023-12-04 RX ORDER — TRAZODONE HYDROCHLORIDE 50 MG/1
50 TABLET ORAL NIGHTLY PRN
Qty: 90 TABLET | Refills: 1 | Status: SHIPPED | OUTPATIENT
Start: 2023-12-04

## 2023-12-04 RX ORDER — LOVASTATIN 20 MG/1
20 TABLET ORAL NIGHTLY
Qty: 90 TABLET | Refills: 1 | Status: SHIPPED | OUTPATIENT
Start: 2023-12-04

## 2023-12-04 RX ORDER — AMLODIPINE BESYLATE 5 MG/1
5 TABLET ORAL 2 TIMES DAILY
Qty: 180 TABLET | Refills: 1 | Status: SHIPPED | OUTPATIENT
Start: 2023-12-04

## 2023-12-04 RX ORDER — BUSPIRONE HYDROCHLORIDE 15 MG/1
15 TABLET ORAL 3 TIMES DAILY
Qty: 270 TABLET | Refills: 1 | Status: SHIPPED | OUTPATIENT
Start: 2023-12-04

## 2023-12-04 ASSESSMENT — COLUMBIA-SUICIDE SEVERITY RATING SCALE - C-SSRS
5. HAVE YOU STARTED TO WORK OUT OR WORKED OUT THE DETAILS OF HOW TO KILL YOURSELF? DO YOU INTEND TO CARRY OUT THIS PLAN?: NO
7. DID THIS OCCUR IN THE LAST THREE MONTHS: NO
4. HAVE YOU HAD THESE THOUGHTS AND HAD SOME INTENTION OF ACTING ON THEM?: NO
3. HAVE YOU BEEN THINKING ABOUT HOW YOU MIGHT KILL YOURSELF?: NO

## 2023-12-04 ASSESSMENT — PATIENT HEALTH QUESTIONNAIRE - PHQ9
SUM OF ALL RESPONSES TO PHQ QUESTIONS 1-9: 0
SUM OF ALL RESPONSES TO PHQ QUESTIONS 1-9: 0
10. IF YOU CHECKED OFF ANY PROBLEMS, HOW DIFFICULT HAVE THESE PROBLEMS MADE IT FOR YOU TO DO YOUR WORK, TAKE CARE OF THINGS AT HOME, OR GET ALONG WITH OTHER PEOPLE: 0
5. POOR APPETITE OR OVEREATING: 0
SUM OF ALL RESPONSES TO PHQ QUESTIONS 1-9: 0
2. FEELING DOWN, DEPRESSED OR HOPELESS: 0
8. MOVING OR SPEAKING SO SLOWLY THAT OTHER PEOPLE COULD HAVE NOTICED. OR THE OPPOSITE, BEING SO FIGETY OR RESTLESS THAT YOU HAVE BEEN MOVING AROUND A LOT MORE THAN USUAL: 0
3. TROUBLE FALLING OR STAYING ASLEEP: 0
7. TROUBLE CONCENTRATING ON THINGS, SUCH AS READING THE NEWSPAPER OR WATCHING TELEVISION: 0
9. THOUGHTS THAT YOU WOULD BE BETTER OFF DEAD, OR OF HURTING YOURSELF: 0
SUM OF ALL RESPONSES TO PHQ9 QUESTIONS 1 & 2: 0
6. FEELING BAD ABOUT YOURSELF - OR THAT YOU ARE A FAILURE OR HAVE LET YOURSELF OR YOUR FAMILY DOWN: 0
SUM OF ALL RESPONSES TO PHQ QUESTIONS 1-9: 0
1. LITTLE INTEREST OR PLEASURE IN DOING THINGS: 0
4. FEELING TIRED OR HAVING LITTLE ENERGY: 0

## 2023-12-04 ASSESSMENT — LIFESTYLE VARIABLES
HOW MANY STANDARD DRINKS CONTAINING ALCOHOL DO YOU HAVE ON A TYPICAL DAY: 1 OR 2
HOW OFTEN DO YOU HAVE A DRINK CONTAINING ALCOHOL: MONTHLY OR LESS

## 2023-12-04 NOTE — ASSESSMENT & PLAN NOTE
Chronic, controlled.  Doing well on prolia, due early Jan. Rx sent to pharmacy, will have her daughter administer it to save on cost.

## 2023-12-04 NOTE — ASSESSMENT & PLAN NOTE
Chronic, controlled. Continue seeing Dr. Markus Myers as recommended.   Continue duloxetine 60 mg daily, buspirone 15 mg TID and trazodone 50 mg nightly as needed for sleep/anxiety

## 2023-12-04 NOTE — ASSESSMENT & PLAN NOTE
Chronic, controlled. Continue seeing Dr. Sandra Maloney as recommended.   Continue duloxetine 60 mg daily, buspirone 15 mg TID and trazodone 50 mg nightly as needed for sleep/anxiety

## 2023-12-04 NOTE — PATIENT INSTRUCTIONS
Follow-up with Dr. Tessy Donald as needed.  If the following occur, call our office to schedule an appointment if:   - a pattern of not sleeping well  - a pattern of not having an appetite  - nothing making you happy

## 2023-12-04 NOTE — PROGRESS NOTES
Behavioral Health Consultation  Morris Guo, Ph.D.  Psychologist  12/4/2023  10:29 AM      Time spent with Patient: 7 minutes  This is patient's fourth  [facility] appointment. Reason for Consult:    Chief Complaint   Patient presents with    Depression       Feedback given to PCP. S:  Pt seen for f/u of depression. Pt reported improved mood and sxs. Noted she was lonely and depressed when living alone and doesn't feel this way anymore. Reviewed relapse prevention and f/u prn. Seen for 7 mins. No charge    O:  MSE:  Appearance    alert, cooperative  Appetite normal  Sleep disturbance No  Fatigue No  Loss of pleasure No  Impulsive behavior No  Speech    spontaneous, normal rate, normal volume, and well articulated  Mood    happy  Affect    normal affect  Thought Content    intact and cognitive distortions  Thought Process    linear, goal directed, and coherent  Associations    logical connections  Insight    Fair  Judgment    Intact  Orientation    oriented to person, place, time, and general circumstances  Memory    recent and remote memory intact  Attention/Concentration    intact  Morbid ideation No  Suicide Assessment    no suicidal ideation    History:  Social History:   Social History     Socioeconomic History    Marital status:       Spouse name: Not on file    Number of children: Not on file    Years of education: Not on file    Highest education level: Not on file   Occupational History    Not on file   Tobacco Use    Smoking status: Never    Smokeless tobacco: Never   Vaping Use    Vaping Use: Never used   Substance and Sexual Activity    Alcohol use: Not Currently     Comment: Couple of drinks nightly reports that she stopped 3 weeks ago    Drug use: Never    Sexual activity: Not Currently     Partners: Male     Comment:    Other Topics Concern    Not on file   Social History Narrative    Not on file     Social Determinants of Health     Financial Resource Strain: Low Risk  (10/17/2022)

## 2023-12-04 NOTE — PROGRESS NOTES
Medicare Annual Wellness Visit    Clau Portillo is here for Medicare AWV, Mood Swings (F/u ), and Arm Pain (Right arm pain and swelling after lifting a heavy box yesterday)    Assessment & Plan   Medicare annual wellness visit, subsequent  Comments:  AWV today.  reviewed. Essential hypertension, benign  Assessment & Plan:  Chronic, controlled. Continue lisinopril 20 mg , carvedilol 6.25 bid, amlodipine 5 mg BID. Orders:  -     amLODIPine (NORVASC) 5 MG tablet; Take 1 tablet by mouth in the morning and at bedtime, Disp-180 tablet, R-1Normal  -     carvedilol (COREG) 6.25 MG tablet; Take 1 tablet by mouth in the morning and at bedtime, Disp-180 tablet, R-1Normal  -     lisinopril (PRINIVIL;ZESTRIL) 20 MG tablet; Take 1 tablet by mouth daily, Disp-90 tablet, R-1Normal  Generalized anxiety disorder  Assessment & Plan:  Chronic, controlled. Continue seeing Dr. Markus Myers as recommended. Continue duloxetine 60 mg daily, buspirone 15 mg TID and trazodone 50 mg nightly as needed for sleep/anxiety  Orders:  -     busPIRone (BUSPAR) 15 MG tablet; Take 15 mg by mouth 3 times daily, Disp-270 tablet, R-1Normal  -     DULoxetine (CYMBALTA) 60 MG extended release capsule; Take 1 capsule by mouth daily, Disp-90 capsule, R-1Normal  -     traZODone (DESYREL) 50 MG tablet; Take 1 tablet by mouth nightly as needed for Sleep, Disp-90 tablet, R-1Normal  Insomnia, unspecified type  Assessment & Plan:  Chronic, controlled. Continue prn trazodone nightly for sleep   Orders:  -     busPIRone (BUSPAR) 15 MG tablet; Take 15 mg by mouth 3 times daily, Disp-270 tablet, R-1Normal  -     DULoxetine (CYMBALTA) 60 MG extended release capsule; Take 1 capsule by mouth daily, Disp-90 capsule, R-1Normal  -     traZODone (DESYREL) 50 MG tablet; Take 1 tablet by mouth nightly as needed for Sleep, Disp-90 tablet, R-1Normal  Severe episode of recurrent major depressive disorder, without psychotic features ([address])  Assessment & Plan:  Chronic, controlled.

## 2024-01-15 ENCOUNTER — TELEPHONE (OUTPATIENT)
Dept: INTERNAL MEDICINE CLINIC | Age: 74
End: 2024-01-15

## 2024-01-16 NOTE — TELEPHONE ENCOUNTER
Submitted PA for PROLIA 60MG  Via [facility]  (Key: [id number]) STATUS: PENDING.     Follow up done daily; if no decision with in three days we will refax.  If another three days goes by with no decision will call the insurance for status.

## 2024-01-18 ENCOUNTER — TELEPHONE (OUTPATIENT)
Dept: INTERNAL MEDICINE CLINIC | Age: 74
End: 2024-01-18

## 2024-01-18 NOTE — TELEPHONE ENCOUNTER
The medication is APPROVED FROM 01/*20/24 THRU 01/20/2025    If this requires a response please respond to the pool ( P MHCX P[facility] MEDICATION PRE-AUTH).      Thank you please advise patient.    Insurance did call to let me know that pharmacy has tried running the Prolia thru and getting a rejected claim. It is not due to be filled until January 21st. Please call to advise.

## 2024-06-10 DIAGNOSIS — F41.1 GENERALIZED ANXIETY DISORDER: ICD-10-CM

## 2024-06-10 DIAGNOSIS — F33.2 SEVERE EPISODE OF RECURRENT MAJOR DEPRESSIVE DISORDER, WITHOUT PSYCHOTIC FEATURES (HCC): ICD-10-CM

## 2024-06-10 DIAGNOSIS — G47.00 INSOMNIA, UNSPECIFIED TYPE: ICD-10-CM

## 2024-06-10 RX ORDER — BUSPIRONE HYDROCHLORIDE 15 MG/1
15 TABLET ORAL 3 TIMES DAILY
Qty: 90 TABLET | Refills: 0 | Status: SHIPPED | OUTPATIENT
Start: 2024-06-10

## 2024-06-10 RX ORDER — DULOXETIN HYDROCHLORIDE 60 MG/1
60 CAPSULE, DELAYED RELEASE ORAL DAILY
Qty: 30 CAPSULE | Refills: 0 | Status: SHIPPED | OUTPATIENT
Start: 2024-06-10

## 2024-06-10 RX ORDER — TRAZODONE HYDROCHLORIDE 50 MG/1
50 TABLET ORAL NIGHTLY PRN
Qty: 30 TABLET | Refills: 0 | Status: SHIPPED | OUTPATIENT
Start: 2024-06-10

## 2024-06-10 NOTE — TELEPHONE ENCOUNTER
Last OV: 12/4/2023  Next OV: 6/25/2024    Next appointment due:6/4/24    Last fill:12/4/23  Refills:1

## 2024-06-22 DIAGNOSIS — I10 ESSENTIAL HYPERTENSION, BENIGN: ICD-10-CM

## 2024-06-22 SDOH — ECONOMIC STABILITY: FOOD INSECURITY: WITHIN THE PAST 12 MONTHS, THE FOOD YOU BOUGHT JUST DIDN'T LAST AND YOU DIDN'T HAVE MONEY TO GET MORE.: NEVER TRUE

## 2024-06-22 SDOH — ECONOMIC STABILITY: INCOME INSECURITY: HOW HARD IS IT FOR YOU TO PAY FOR THE VERY BASICS LIKE FOOD, HOUSING, MEDICAL CARE, AND HEATING?: NOT HARD AT ALL

## 2024-06-22 SDOH — HEALTH STABILITY: PHYSICAL HEALTH: ON AVERAGE, HOW MANY DAYS PER WEEK DO YOU ENGAGE IN MODERATE TO STRENUOUS EXERCISE (LIKE A BRISK WALK)?: 2 DAYS

## 2024-06-22 SDOH — ECONOMIC STABILITY: FOOD INSECURITY: WITHIN THE PAST 12 MONTHS, YOU WORRIED THAT YOUR FOOD WOULD RUN OUT BEFORE YOU GOT MONEY TO BUY MORE.: NEVER TRUE

## 2024-06-22 SDOH — HEALTH STABILITY: PHYSICAL HEALTH: ON AVERAGE, HOW MANY MINUTES DO YOU ENGAGE IN EXERCISE AT THIS LEVEL?: 20 MIN

## 2024-06-22 ASSESSMENT — PATIENT HEALTH QUESTIONNAIRE - PHQ9
4. FEELING TIRED OR HAVING LITTLE ENERGY: NOT AT ALL
10. IF YOU CHECKED OFF ANY PROBLEMS, HOW DIFFICULT HAVE THESE PROBLEMS MADE IT FOR YOU TO DO YOUR WORK, TAKE CARE OF THINGS AT HOME, OR GET ALONG WITH OTHER PEOPLE: NOT DIFFICULT AT ALL
SUM OF ALL RESPONSES TO PHQ QUESTIONS 1-9: 0
6. FEELING BAD ABOUT YOURSELF - OR THAT YOU ARE A FAILURE OR HAVE LET YOURSELF OR YOUR FAMILY DOWN: NOT AT ALL
SUM OF ALL RESPONSES TO PHQ QUESTIONS 1-9: 0
SUM OF ALL RESPONSES TO PHQ QUESTIONS 1-9: 0
3. TROUBLE FALLING OR STAYING ASLEEP: NOT AT ALL
2. FEELING DOWN, DEPRESSED OR HOPELESS: NOT AT ALL
9. THOUGHTS THAT YOU WOULD BE BETTER OFF DEAD, OR OF HURTING YOURSELF: NOT AT ALL
1. LITTLE INTEREST OR PLEASURE IN DOING THINGS: NOT AT ALL
SUM OF ALL RESPONSES TO PHQ QUESTIONS 1-9: 0
SUM OF ALL RESPONSES TO PHQ9 QUESTIONS 1 & 2: 0
5. POOR APPETITE OR OVEREATING: NOT AT ALL
7. TROUBLE CONCENTRATING ON THINGS, SUCH AS READING THE NEWSPAPER OR WATCHING TELEVISION: NOT AT ALL

## 2024-06-22 ASSESSMENT — LIFESTYLE VARIABLES
HOW OFTEN DO YOU HAVE SIX OR MORE DRINKS ON ONE OCCASION: 1
HOW OFTEN DO YOU HAVE A DRINK CONTAINING ALCOHOL: 2
HOW MANY STANDARD DRINKS CONTAINING ALCOHOL DO YOU HAVE ON A TYPICAL DAY: 1 OR 2
HOW OFTEN DO YOU HAVE A DRINK CONTAINING ALCOHOL: MONTHLY OR LESS
HOW MANY STANDARD DRINKS CONTAINING ALCOHOL DO YOU HAVE ON A TYPICAL DAY: 1

## 2024-06-24 DIAGNOSIS — I10 ESSENTIAL HYPERTENSION, BENIGN: ICD-10-CM

## 2024-06-24 DIAGNOSIS — E78.5 HYPERLIPIDEMIA, UNSPECIFIED HYPERLIPIDEMIA TYPE: ICD-10-CM

## 2024-06-24 RX ORDER — LISINOPRIL 20 MG/1
20 TABLET ORAL DAILY
Qty: 90 TABLET | Refills: 0 | Status: SHIPPED | OUTPATIENT
Start: 2024-06-24 | End: 2024-06-25 | Stop reason: SDUPTHER

## 2024-06-24 NOTE — TELEPHONE ENCOUNTER
Last OV: 12/4/2023  Next OV: 6/25/2024    Next appointment due:(on 6/4/2024     Last fill:12/4/23  Refills:1#90

## 2024-06-25 ENCOUNTER — OFFICE VISIT (OUTPATIENT)
Dept: INTERNAL MEDICINE CLINIC | Age: 74
End: 2024-06-25
Payer: MEDICARE

## 2024-06-25 VITALS
BODY MASS INDEX: 19.82 KG/M2 | SYSTOLIC BLOOD PRESSURE: 112 MMHG | HEART RATE: 82 BPM | DIASTOLIC BLOOD PRESSURE: 68 MMHG | WEIGHT: 130.8 LBS | HEIGHT: 68 IN | OXYGEN SATURATION: 98 %

## 2024-06-25 DIAGNOSIS — M81.0 AGE-RELATED OSTEOPOROSIS WITHOUT CURRENT PATHOLOGICAL FRACTURE: ICD-10-CM

## 2024-06-25 DIAGNOSIS — I10 ESSENTIAL HYPERTENSION, BENIGN: ICD-10-CM

## 2024-06-25 DIAGNOSIS — F41.1 GENERALIZED ANXIETY DISORDER: ICD-10-CM

## 2024-06-25 DIAGNOSIS — R73.09 ELEVATED GLUCOSE: ICD-10-CM

## 2024-06-25 DIAGNOSIS — E53.8 B12 DEFICIENCY: ICD-10-CM

## 2024-06-25 DIAGNOSIS — F33.42 RECURRENT MAJOR DEPRESSIVE DISORDER, IN FULL REMISSION (HCC): ICD-10-CM

## 2024-06-25 DIAGNOSIS — G47.00 INSOMNIA, UNSPECIFIED TYPE: ICD-10-CM

## 2024-06-25 DIAGNOSIS — Z00.00 MEDICARE ANNUAL WELLNESS VISIT, SUBSEQUENT: Primary | ICD-10-CM

## 2024-06-25 DIAGNOSIS — E78.5 HYPERLIPIDEMIA, UNSPECIFIED HYPERLIPIDEMIA TYPE: ICD-10-CM

## 2024-06-25 DIAGNOSIS — Z12.31 ENCOUNTER FOR SCREENING MAMMOGRAM FOR MALIGNANT NEOPLASM OF BREAST: ICD-10-CM

## 2024-06-25 DIAGNOSIS — F33.2 SEVERE EPISODE OF RECURRENT MAJOR DEPRESSIVE DISORDER, WITHOUT PSYCHOTIC FEATURES (HCC): ICD-10-CM

## 2024-06-25 PROBLEM — R35.0 URINARY FREQUENCY: Status: RESOLVED | Noted: 2023-07-24 | Resolved: 2024-06-25

## 2024-06-25 PROBLEM — S46.911A RIGHT SHOULDER STRAIN: Status: RESOLVED | Noted: 2023-12-04 | Resolved: 2024-06-25

## 2024-06-25 LAB
ANION GAP SERPL CALCULATED.3IONS-SCNC: 10 MMOL/L (ref 3–16)
BUN SERPL-MCNC: 13 MG/DL (ref 7–20)
CALCIUM SERPL-MCNC: 9.9 MG/DL (ref 8.3–10.6)
CHLORIDE SERPL-SCNC: 101 MMOL/L (ref 99–110)
CHOLEST SERPL-MCNC: 152 MG/DL (ref 0–199)
CO2 SERPL-SCNC: 25 MMOL/L (ref 21–32)
CREAT SERPL-MCNC: 0.6 MG/DL (ref 0.6–1.2)
FOLATE SERPL-MCNC: >20 NG/ML (ref 4.78–24.2)
GFR SERPLBLD CREATININE-BSD FMLA CKD-EPI: >90 ML/MIN/{1.73_M2}
GLUCOSE SERPL-MCNC: 115 MG/DL (ref 70–99)
HDLC SERPL-MCNC: 64 MG/DL (ref 40–60)
LDL CHOLESTEROL: 76 MG/DL
POTASSIUM SERPL-SCNC: 4.4 MMOL/L (ref 3.5–5.1)
SODIUM SERPL-SCNC: 136 MMOL/L (ref 136–145)
TRIGL SERPL-MCNC: 60 MG/DL (ref 0–150)
VIT B12 SERPL-MCNC: >2000 PG/ML (ref 211–911)
VLDLC SERPL CALC-MCNC: 12 MG/DL

## 2024-06-25 PROCEDURE — 99214 OFFICE O/P EST MOD 30 MIN: CPT | Performed by: NURSE PRACTITIONER

## 2024-06-25 PROCEDURE — G0439 PPPS, SUBSEQ VISIT: HCPCS | Performed by: NURSE PRACTITIONER

## 2024-06-25 PROCEDURE — 1123F ACP DISCUSS/DSCN MKR DOCD: CPT | Performed by: NURSE PRACTITIONER

## 2024-06-25 PROCEDURE — 3078F DIAST BP <80 MM HG: CPT | Performed by: NURSE PRACTITIONER

## 2024-06-25 PROCEDURE — 3074F SYST BP LT 130 MM HG: CPT | Performed by: NURSE PRACTITIONER

## 2024-06-25 RX ORDER — LISINOPRIL 20 MG/1
20 TABLET ORAL DAILY
Qty: 90 TABLET | Refills: 3 | Status: SHIPPED | OUTPATIENT
Start: 2024-06-25

## 2024-06-25 RX ORDER — BUSPIRONE HYDROCHLORIDE 15 MG/1
15 TABLET ORAL 3 TIMES DAILY
Qty: 270 TABLET | Refills: 3 | Status: SHIPPED | OUTPATIENT
Start: 2024-06-25

## 2024-06-25 RX ORDER — ALUMINUM ZIRCONIUM OCTACHLOROHYDREX GLY 16 G/100G
GEL TOPICAL
COMMUNITY
Start: 2024-06-01

## 2024-06-25 RX ORDER — LOVASTATIN 20 MG/1
20 TABLET ORAL NIGHTLY
Qty: 90 TABLET | Refills: 0 | OUTPATIENT
Start: 2024-06-25

## 2024-06-25 RX ORDER — AMLODIPINE BESYLATE 5 MG/1
5 TABLET ORAL 2 TIMES DAILY
Qty: 180 TABLET | Refills: 1 | Status: SHIPPED | OUTPATIENT
Start: 2024-06-25

## 2024-06-25 RX ORDER — CARVEDILOL 6.25 MG/1
6.25 TABLET ORAL 2 TIMES DAILY
Qty: 180 TABLET | Refills: 1 | Status: SHIPPED | OUTPATIENT
Start: 2024-06-25

## 2024-06-25 RX ORDER — LOVASTATIN 20 MG/1
20 TABLET ORAL NIGHTLY
Qty: 90 TABLET | Refills: 3 | Status: SHIPPED | OUTPATIENT
Start: 2024-06-25

## 2024-06-25 RX ORDER — TRAZODONE HYDROCHLORIDE 50 MG/1
50 TABLET ORAL NIGHTLY PRN
Qty: 90 TABLET | Refills: 3 | Status: SHIPPED | OUTPATIENT
Start: 2024-06-25

## 2024-06-25 RX ORDER — DULOXETIN HYDROCHLORIDE 60 MG/1
60 CAPSULE, DELAYED RELEASE ORAL DAILY
Qty: 90 CAPSULE | Refills: 3 | Status: SHIPPED | OUTPATIENT
Start: 2024-06-25

## 2024-06-25 RX ORDER — AMLODIPINE BESYLATE 5 MG/1
5 TABLET ORAL 2 TIMES DAILY
Qty: 180 TABLET | Refills: 0 | OUTPATIENT
Start: 2024-06-25

## 2024-06-25 RX ORDER — CARVEDILOL 6.25 MG/1
6.25 TABLET ORAL 2 TIMES DAILY
Qty: 180 TABLET | Refills: 0 | OUTPATIENT
Start: 2024-06-25

## 2024-06-25 NOTE — TELEPHONE ENCOUNTER
Last OV: 12/4/2023  Next OV: 6/25/2024    Next appointment due:on 6/4/2024     Last fill:12/4/23  Refills:1#90

## 2024-06-25 NOTE — ASSESSMENT & PLAN NOTE
Chronic, controlled. Continue duloxetine 60 mg daily, buspirone 15 mg TID and trazodone 50 mg nightly as needed for sleep/anxiety. No longer seeing Dr. Fortune.

## 2024-06-25 NOTE — ASSESSMENT & PLAN NOTE
Chronic, controlled. Continue lisinopril 20 mg , carvedilol 6.25 bid, amlodipine 5 mg BID. Fasting labs ordered.

## 2024-06-25 NOTE — ASSESSMENT & PLAN NOTE
Chronic, controlled. Doing well on prolia, due on July 27th. Rx sent to pharmacy, will continue to have her daughter administer it to save on cost. Dexa due Jan 2025

## 2024-06-25 NOTE — PROGRESS NOTES
mg , carvedilol 6.25 bid, amlodipine 5 mg BID. Fasting labs ordered.   Orders:  -     lisinopril (PRINIVIL;ZESTRIL) 20 MG tablet; Take 1 tablet by mouth daily, Disp-90 tablet, R-3Normal  -     amLODIPine (NORVASC) 5 MG tablet; Take 1 tablet by mouth in the morning and at bedtime, Disp-180 tablet, R-1Normal  -     carvedilol (COREG) 6.25 MG tablet; Take 1 tablet by mouth in the morning and at bedtime, Disp-180 tablet, R-1Normal  -     Basic Metabolic Panel; Future  Age-related osteoporosis without current pathological fracture  Assessment & Plan:  Chronic, controlled. Doing well on prolia, due on July 27th. Rx sent to pharmacy, will continue to have her daughter administer it to save on cost. Dexa due Jan 2025  Orders:  -     denosumab (PROLIA) 60 MG/ML SOSY SC injection; Inject 1 mL into the skin every 6 months, Disp-1 mL, R-1Due for injection July 27, daughter administersNormal  Hyperlipidemia, unspecified hyperlipidemia type  Assessment & Plan:  Chronic, controlled. Continue lovastatin 20 mg daily. Fasting labs ordered.   Orders:  -     lovastatin (MEVACOR) 20 MG tablet; Take 1 tablet by mouth nightly, Disp-90 tablet, R-3Normal  -     Lipid, Fasting; Future  B12 deficiency  Assessment & Plan:  Takes supplement. Checking blood work.   Orders:  -     Vitamin B12 & Folate; Future  Elevated glucose  Assessment & Plan:  Chronic, stable. Checking labs.   Orders:  -     Hemoglobin A1C; Future  Recurrent major depressive disorder, in full remission (HCC)  Assessment & Plan:   Chronic, controlled. Continue duloxetine 60 mg daily, buspirone 15 mg TID and trazodone 50 mg nightly as needed for sleep/anxiety. No longer seeing Dr. Fortune.   Encounter for screening mammogram for malignant neoplasm of breast  -     SOSA DIGITAL SCREEN W OR WO CAD BILATERAL; Future    Recommendations for Preventive Services Due: see orders and patient instructions/AVS.  Recommended screening schedule for the next 5-10 years is provided to the

## 2024-06-26 LAB
EST. AVERAGE GLUCOSE BLD GHB EST-MCNC: 108.3 MG/DL
HBA1C MFR BLD: 5.4 %

## 2024-07-07 DIAGNOSIS — I10 ESSENTIAL HYPERTENSION, BENIGN: ICD-10-CM

## 2024-07-07 DIAGNOSIS — E78.5 HYPERLIPIDEMIA, UNSPECIFIED HYPERLIPIDEMIA TYPE: ICD-10-CM

## 2024-07-08 RX ORDER — AMLODIPINE BESYLATE 5 MG/1
5 TABLET ORAL 2 TIMES DAILY
Qty: 180 TABLET | Refills: 0 | OUTPATIENT
Start: 2024-07-08

## 2024-07-08 RX ORDER — LOVASTATIN 20 MG
20 TABLET ORAL NIGHTLY
Qty: 90 TABLET | Refills: 0 | OUTPATIENT
Start: 2024-07-08

## 2024-07-08 RX ORDER — CARVEDILOL 6.25 MG/1
6.25 TABLET ORAL 2 TIMES DAILY
Qty: 180 TABLET | Refills: 0 | OUTPATIENT
Start: 2024-07-08

## 2024-07-10 DIAGNOSIS — F33.2 SEVERE EPISODE OF RECURRENT MAJOR DEPRESSIVE DISORDER, WITHOUT PSYCHOTIC FEATURES (HCC): ICD-10-CM

## 2024-07-10 DIAGNOSIS — F41.1 GENERALIZED ANXIETY DISORDER: ICD-10-CM

## 2024-07-10 DIAGNOSIS — G47.00 INSOMNIA, UNSPECIFIED TYPE: ICD-10-CM

## 2024-07-10 RX ORDER — DULOXETIN HYDROCHLORIDE 60 MG/1
CAPSULE, DELAYED RELEASE ORAL
Qty: 30 CAPSULE | Refills: 0 | Status: SHIPPED | OUTPATIENT
Start: 2024-07-10

## 2024-07-10 RX ORDER — BUSPIRONE HYDROCHLORIDE 15 MG/1
TABLET ORAL
Qty: 90 TABLET | Refills: 0 | Status: SHIPPED | OUTPATIENT
Start: 2024-07-10

## 2024-07-10 RX ORDER — TRAZODONE HYDROCHLORIDE 50 MG/1
TABLET ORAL
Qty: 30 TABLET | Refills: 0 | Status: SHIPPED | OUTPATIENT
Start: 2024-07-10

## 2024-07-22 ENCOUNTER — TELEPHONE (OUTPATIENT)
Dept: INTERNAL MEDICINE CLINIC | Age: 74
End: 2024-07-22

## 2024-07-22 ENCOUNTER — HOSPITAL ENCOUNTER (OUTPATIENT)
Dept: WOMENS IMAGING | Age: 74
Discharge: HOME | End: 2024-07-22
Payer: MEDICARE

## 2024-07-22 VITALS — WEIGHT: 130 LBS | BODY MASS INDEX: 19.7 KG/M2 | HEIGHT: 68 IN

## 2024-07-22 DIAGNOSIS — Z12.31 ENCOUNTER FOR SCREENING MAMMOGRAM FOR MALIGNANT NEOPLASM OF BREAST: ICD-10-CM

## 2024-07-22 PROCEDURE — 77063 BREAST TOMOSYNTHESIS BI: CPT

## 2024-07-22 NOTE — TELEPHONE ENCOUNTER
Pt calling having trouble getting her refill of Prolia sent to her--[employer] [facility] telling her we need to contact them--Can somone please call them she is due for it this Sat---thanks.

## 2024-07-25 PROBLEM — Z00.00 MEDICARE ANNUAL WELLNESS VISIT, SUBSEQUENT: Status: RESOLVED | Noted: 2024-06-25 | Resolved: 2024-07-25

## 2024-11-19 DIAGNOSIS — I10 ESSENTIAL HYPERTENSION, BENIGN: ICD-10-CM

## 2024-11-20 RX ORDER — AMLODIPINE BESYLATE 5 MG/1
5 TABLET ORAL 2 TIMES DAILY
Qty: 180 TABLET | Refills: 1 | Status: SHIPPED | OUTPATIENT
Start: 2024-11-20

## 2024-11-20 RX ORDER — CARVEDILOL 6.25 MG/1
6.25 TABLET ORAL 2 TIMES DAILY
Qty: 180 TABLET | Refills: 1 | Status: SHIPPED | OUTPATIENT
Start: 2024-11-20

## 2025-02-08 SDOH — ECONOMIC STABILITY: FOOD INSECURITY: WITHIN THE PAST 12 MONTHS, THE FOOD YOU BOUGHT JUST DIDN'T LAST AND YOU DIDN'T HAVE MONEY TO GET MORE.: NEVER TRUE

## 2025-02-08 SDOH — ECONOMIC STABILITY: FOOD INSECURITY: WITHIN THE PAST 12 MONTHS, YOU WORRIED THAT YOUR FOOD WOULD RUN OUT BEFORE YOU GOT MONEY TO BUY MORE.: NEVER TRUE

## 2025-02-08 SDOH — ECONOMIC STABILITY: TRANSPORTATION INSECURITY
IN THE PAST 12 MONTHS, HAS THE LACK OF TRANSPORTATION KEPT YOU FROM MEDICAL APPOINTMENTS OR FROM GETTING MEDICATIONS?: NO

## 2025-02-08 SDOH — ECONOMIC STABILITY: INCOME INSECURITY: IN THE LAST 12 MONTHS, WAS THERE A TIME WHEN YOU WERE NOT ABLE TO PAY THE MORTGAGE OR RENT ON TIME?: NO

## 2025-02-10 SDOH — HEALTH STABILITY: PHYSICAL HEALTH: ON AVERAGE, HOW MANY MINUTES DO YOU ENGAGE IN EXERCISE AT THIS LEVEL?: 20 MIN

## 2025-02-10 SDOH — HEALTH STABILITY: PHYSICAL HEALTH: ON AVERAGE, HOW MANY DAYS PER WEEK DO YOU ENGAGE IN MODERATE TO STRENUOUS EXERCISE (LIKE A BRISK WALK)?: 2 DAYS

## 2025-02-10 ASSESSMENT — PATIENT HEALTH QUESTIONNAIRE - PHQ9
7. TROUBLE CONCENTRATING ON THINGS, SUCH AS READING THE NEWSPAPER OR WATCHING TELEVISION: NOT AT ALL
4. FEELING TIRED OR HAVING LITTLE ENERGY: NOT AT ALL
6. FEELING BAD ABOUT YOURSELF - OR THAT YOU ARE A FAILURE OR HAVE LET YOURSELF OR YOUR FAMILY DOWN: NOT AT ALL
8. MOVING OR SPEAKING SO SLOWLY THAT OTHER PEOPLE COULD HAVE NOTICED. OR THE OPPOSITE, BEING SO FIGETY OR RESTLESS THAT YOU HAVE BEEN MOVING AROUND A LOT MORE THAN USUAL: NOT AT ALL
SUM OF ALL RESPONSES TO PHQ QUESTIONS 1-9: 0
9. THOUGHTS THAT YOU WOULD BE BETTER OFF DEAD, OR OF HURTING YOURSELF: NOT AT ALL
1. LITTLE INTEREST OR PLEASURE IN DOING THINGS: NOT AT ALL
3. TROUBLE FALLING OR STAYING ASLEEP: NOT AT ALL
SUM OF ALL RESPONSES TO PHQ QUESTIONS 1-9: 0
5. POOR APPETITE OR OVEREATING: NOT AT ALL
SUM OF ALL RESPONSES TO PHQ QUESTIONS 1-9: 0
10. IF YOU CHECKED OFF ANY PROBLEMS, HOW DIFFICULT HAVE THESE PROBLEMS MADE IT FOR YOU TO DO YOUR WORK, TAKE CARE OF THINGS AT HOME, OR GET ALONG WITH OTHER PEOPLE: NOT DIFFICULT AT ALL
SUM OF ALL RESPONSES TO PHQ9 QUESTIONS 1 & 2: 0
SUM OF ALL RESPONSES TO PHQ QUESTIONS 1-9: 0
2. FEELING DOWN, DEPRESSED OR HOPELESS: NOT AT ALL

## 2025-02-10 ASSESSMENT — LIFESTYLE VARIABLES
HOW OFTEN DO YOU HAVE A DRINK CONTAINING ALCOHOL: 2
HOW OFTEN DO YOU HAVE A DRINK CONTAINING ALCOHOL: MONTHLY OR LESS
HOW MANY STANDARD DRINKS CONTAINING ALCOHOL DO YOU HAVE ON A TYPICAL DAY: 1 OR 2
HOW MANY STANDARD DRINKS CONTAINING ALCOHOL DO YOU HAVE ON A TYPICAL DAY: 1
HOW OFTEN DO YOU HAVE SIX OR MORE DRINKS ON ONE OCCASION: 1

## 2025-02-11 ENCOUNTER — OFFICE VISIT (OUTPATIENT)
Dept: INTERNAL MEDICINE CLINIC | Age: 75
End: 2025-02-11
Payer: MEDICARE

## 2025-02-11 VITALS
HEIGHT: 68 IN | HEART RATE: 72 BPM | WEIGHT: 131 LBS | DIASTOLIC BLOOD PRESSURE: 70 MMHG | SYSTOLIC BLOOD PRESSURE: 120 MMHG | OXYGEN SATURATION: 98 % | BODY MASS INDEX: 19.85 KG/M2

## 2025-02-11 DIAGNOSIS — F41.1 GENERALIZED ANXIETY DISORDER: ICD-10-CM

## 2025-02-11 DIAGNOSIS — R73.09 ELEVATED GLUCOSE: ICD-10-CM

## 2025-02-11 DIAGNOSIS — M81.0 AGE-RELATED OSTEOPOROSIS WITHOUT CURRENT PATHOLOGICAL FRACTURE: ICD-10-CM

## 2025-02-11 DIAGNOSIS — Z00.00 MEDICARE ANNUAL WELLNESS VISIT, SUBSEQUENT: Primary | ICD-10-CM

## 2025-02-11 DIAGNOSIS — I10 ESSENTIAL HYPERTENSION, BENIGN: ICD-10-CM

## 2025-02-11 DIAGNOSIS — F33.42 RECURRENT MAJOR DEPRESSIVE DISORDER, IN FULL REMISSION (HCC): ICD-10-CM

## 2025-02-11 DIAGNOSIS — G47.00 INSOMNIA, UNSPECIFIED TYPE: ICD-10-CM

## 2025-02-11 DIAGNOSIS — E78.5 HYPERLIPIDEMIA, UNSPECIFIED HYPERLIPIDEMIA TYPE: ICD-10-CM

## 2025-02-11 PROCEDURE — 1123F ACP DISCUSS/DSCN MKR DOCD: CPT | Performed by: NURSE PRACTITIONER

## 2025-02-11 PROCEDURE — 3074F SYST BP LT 130 MM HG: CPT | Performed by: NURSE PRACTITIONER

## 2025-02-11 PROCEDURE — 3078F DIAST BP <80 MM HG: CPT | Performed by: NURSE PRACTITIONER

## 2025-02-11 PROCEDURE — 1159F MED LIST DOCD IN RCRD: CPT | Performed by: NURSE PRACTITIONER

## 2025-02-11 PROCEDURE — G0439 PPPS, SUBSEQ VISIT: HCPCS | Performed by: NURSE PRACTITIONER

## 2025-02-11 RX ORDER — LISINOPRIL 20 MG/1
20 TABLET ORAL DAILY
Qty: 90 TABLET | Refills: 3 | Status: SHIPPED | OUTPATIENT
Start: 2025-02-11

## 2025-02-11 RX ORDER — TRAZODONE HYDROCHLORIDE 50 MG/1
50 TABLET, FILM COATED ORAL NIGHTLY PRN
Qty: 90 TABLET | Refills: 3 | Status: SHIPPED | OUTPATIENT
Start: 2025-02-11

## 2025-02-11 RX ORDER — BUSPIRONE HYDROCHLORIDE 15 MG/1
15 TABLET ORAL 3 TIMES DAILY
Qty: 270 TABLET | Refills: 3 | Status: SHIPPED | OUTPATIENT
Start: 2025-02-11

## 2025-02-11 RX ORDER — LOVASTATIN 20 MG/1
20 TABLET ORAL NIGHTLY
Qty: 90 TABLET | Refills: 3 | Status: SHIPPED | OUTPATIENT
Start: 2025-02-11

## 2025-02-11 RX ORDER — DULOXETIN HYDROCHLORIDE 60 MG/1
60 CAPSULE, DELAYED RELEASE ORAL DAILY
Qty: 90 CAPSULE | Refills: 3 | Status: SHIPPED | OUTPATIENT
Start: 2025-02-11

## 2025-02-11 NOTE — ASSESSMENT & PLAN NOTE
Chronic, controlled. Continue prn trazodone nightly for sleep     Orders:    traZODone (DESYREL) 50 MG tablet; Take 1 tablet by mouth nightly as needed for Sleep    DULoxetine (CYMBALTA) 60 MG extended release capsule; Take 1 capsule by mouth daily    busPIRone (BUSPAR) 15 MG tablet; Take 15 mg by mouth 3 times daily

## 2025-02-11 NOTE — ASSESSMENT & PLAN NOTE
Chronic, controlled. Continue duloxetine 60 mg daily, buspirone 15 mg TID and trazodone 50 mg nightly as needed for sleep/anxiety.

## 2025-02-11 NOTE — ASSESSMENT & PLAN NOTE
Chronic, controlled. Continue lovastatin 20 mg daily. Most recent BW reviewed.     Orders:    lovastatin (MEVACOR) 20 MG tablet; Take 1 tablet by mouth nightly

## 2025-02-11 NOTE — PATIENT INSTRUCTIONS
Learning About Being Active as an Older Adult  Why is being active important as you get older?     Being active is one of the best things you can do for your health. And it's never too late to start. Being active--or getting active, if you aren't already--has definite benefits. It can:  Give you more energy,  Keep your mind sharp.  Improve balance to reduce your risk of falls.  Help you manage chronic illness with fewer medicines.  No matter how old you are, how fit you are, or what health problems you have, there is a form of activity that will work for you. And the more physical activity you can do, the better your overall health will be.  What kinds of activity can help you stay healthy?  Being more active will make your daily activities easier. Physical activity includes planned exercise and things you do in daily life. There are four types of activity:  Aerobic.  Doing aerobic activity makes your heart and lungs strong.  Includes walking, dancing, and gardening.  Aim for at least 2½ hours spread throughout the week.  It improves your energy and can help you sleep better.  Muscle-strengthening.  This type of activity can help maintain muscle and strengthen bones.  Includes climbing stairs, using resistance bands, and lifting or carrying heavy loads.  Aim for at least twice a week.  It can help protect the knees and other joints.  Stretching.  Stretching gives you better range of motion in joints and muscles.  Includes upper arm stretches, calf stretches, and gentle yoga.  Aim for at least twice a week, preferably after your muscles are warmed up from other activities.  It can help you function better in daily life.  Balancing.  This helps you stay coordinated and have good posture.  Includes heel-to-toe walking, winter chi, and certain types of yoga.  Aim for at least 3 days a week.  It can reduce your risk of falling.  Even if you have a hard time meeting the recommendations, it's better to be more active

## 2025-02-11 NOTE — ASSESSMENT & PLAN NOTE
Chronic, controlled. Continue duloxetine 60 mg daily, buspirone 15 mg TID and trazodone 50 mg nightly as needed for sleep/anxiety.     Orders:    traZODone (DESYREL) 50 MG tablet; Take 1 tablet by mouth nightly as needed for Sleep    DULoxetine (CYMBALTA) 60 MG extended release capsule; Take 1 capsule by mouth daily    busPIRone (BUSPAR) 15 MG tablet; Take 15 mg by mouth 3 times daily

## 2025-02-11 NOTE — PROGRESS NOTES
Head: Normocephalic and atraumatic.   Cardiovascular:      Rate and Rhythm: Normal rate and regular rhythm.   Pulmonary:      Effort: Pulmonary effort is normal. No respiratory distress.      Breath sounds: Normal breath sounds.   Neurological:      Mental Status: She is alert and oriented to person, place, and time. Mental status is at baseline.   Psychiatric:         Mood and Affect: Mood normal.         Behavior: Behavior normal.                     Allergies   Allergen Reactions    Hydrochlorothiazide      hyponatremia     Prior to Visit Medications    Medication Sig Taking? Authorizing Provider   traZODone (DESYREL) 50 MG tablet Take 1 tablet by mouth nightly as needed for Sleep Yes Mary Kay Brian APRN - CNP   lovastatin (MEVACOR) 20 MG tablet Take 1 tablet by mouth nightly Yes Mary Kay Brian APRN - CNP   lisinopril (PRINIVIL;ZESTRIL) 20 MG tablet Take 1 tablet by mouth daily Yes Mary Kay Brian APRN - CNP   DULoxetine (CYMBALTA) 60 MG extended release capsule Take 1 capsule by mouth daily Yes Mary Kay Brian APRN - CNP   busPIRone (BUSPAR) 15 MG tablet Take 15 mg by mouth 3 times daily Yes Mary Kay Brian APRN - CNP   amLODIPine (NORVASC) 5 MG tablet Take 1 tablet by mouth in the morning and at bedtime Yes Mary Kay Brian APRN - CNP   carvedilol (COREG) 6.25 MG tablet Take 1 tablet by mouth in the morning and at bedtime Yes Mary Kay Brian APRN - CNP   psyllium (METAMUCIL SMOOTH TEXTURE) 58.6 % powder  Yes Susan Gray MD   vitamin B-12 (CYANOCOBALAMIN) 500 MCG tablet Take 1 tablet by mouth daily Yes Susan Gray MD   Calcium Carbonate-Vitamin D (CALCIUM + D PO) Take 1 tablet by mouth daily  Yes Susan Gray MD   Glucosamine-Chondroit-Vit C-Mn (GLUCOSAMINE CHONDR 1500 COMPLX) CAPS Take 1 tablet by mouth daily  Yes Susan Gray MD   Multiple Vitamins-Minerals (ONE-A-DAY WOMENS 50 PLUS PO) Take 1 tablet by mouth daily. Yes Susan Gray MD

## 2025-02-11 NOTE — ASSESSMENT & PLAN NOTE
Chronic, controlled. Continue lisinopril 20 mg , carvedilol 6.25 bid, amlodipine 5 mg BID. BW reviewed and stable.       Orders:    lisinopril (PRINIVIL;ZESTRIL) 20 MG tablet; Take 1 tablet by mouth daily

## 2025-02-11 NOTE — ASSESSMENT & PLAN NOTE
Chronic, was doing well with prolia every 6 months. She is over due for injection due to recent cost changes. Not interested in alternate therapy at this time. Repeat Dexa ordered.     Orders:    DEXA BONE DENSITY AXIAL SKELETON; Future

## 2025-02-12 ENCOUNTER — PATIENT MESSAGE (OUTPATIENT)
Dept: INTERNAL MEDICINE CLINIC | Age: 75
End: 2025-02-12

## 2025-02-12 DIAGNOSIS — I10 ESSENTIAL HYPERTENSION, BENIGN: ICD-10-CM

## 2025-02-12 RX ORDER — CARVEDILOL 6.25 MG/1
6.25 TABLET ORAL 2 TIMES DAILY
Qty: 180 TABLET | Refills: 1 | OUTPATIENT
Start: 2025-02-12

## 2025-02-12 RX ORDER — CARVEDILOL 6.25 MG/1
6.25 TABLET ORAL 2 TIMES DAILY
Qty: 180 TABLET | Refills: 3 | Status: SHIPPED | OUTPATIENT
Start: 2025-02-12 | End: 2025-02-13 | Stop reason: SDUPTHER

## 2025-02-12 RX ORDER — AMLODIPINE BESYLATE 5 MG/1
5 TABLET ORAL 2 TIMES DAILY
Qty: 180 TABLET | Refills: 1 | OUTPATIENT
Start: 2025-02-12

## 2025-02-12 RX ORDER — AMLODIPINE BESYLATE 5 MG/1
5 TABLET ORAL 2 TIMES DAILY
Qty: 180 TABLET | Refills: 3 | Status: SHIPPED | OUTPATIENT
Start: 2025-02-12 | End: 2025-02-13 | Stop reason: SDUPTHER

## 2025-02-12 NOTE — TELEPHONE ENCOUNTER
carvedilol (COREG) 6.25 MG tablet   Last OV: 2/11/2025  Next OV: 8/11/2025    Next appointment due: 8/11/2025    Last fill: 11/20/2024  Refills: 1    amLODIPine (NORVASC) 5 MG tablet   Last OV: 2/11/2025  Next OV: 8/11/2025    Next appointment due: 8/11/2025    Last fill: 11/20/2024  Refills: 1

## 2025-02-12 NOTE — TELEPHONE ENCOUNTER
Last OV: 2/11/2025  Next OV: 8/11/2025    Next appointment due:around 8/11/2025)     Last fill:11/20/24  Refills:1#180

## 2025-02-13 RX ORDER — CARVEDILOL 6.25 MG/1
6.25 TABLET ORAL 2 TIMES DAILY
Qty: 180 TABLET | Refills: 3 | Status: SHIPPED | OUTPATIENT
Start: 2025-02-13

## 2025-02-13 RX ORDER — AMLODIPINE BESYLATE 5 MG/1
5 TABLET ORAL 2 TIMES DAILY
Qty: 180 TABLET | Refills: 3 | Status: SHIPPED | OUTPATIENT
Start: 2025-02-13

## 2025-02-13 RX ORDER — ALENDRONATE SODIUM 70 MG/1
70 TABLET ORAL
Qty: 13 TABLET | Refills: 0 | Status: SHIPPED | OUTPATIENT
Start: 2025-02-13

## 2025-03-11 ENCOUNTER — HOSPITAL ENCOUNTER (OUTPATIENT)
Dept: GENERAL RADIOLOGY | Age: 75
Discharge: HOME | End: 2025-03-11
Payer: MEDICARE

## 2025-03-11 DIAGNOSIS — M81.0 AGE-RELATED OSTEOPOROSIS WITHOUT CURRENT PATHOLOGICAL FRACTURE: ICD-10-CM

## 2025-03-11 PROCEDURE — 77080 DXA BONE DENSITY AXIAL: CPT

## 2025-03-17 ENCOUNTER — RESULTS FOLLOW-UP (OUTPATIENT)
Dept: GENERAL RADIOLOGY | Age: 75
End: 2025-03-17

## 2025-03-24 RX ORDER — ALENDRONATE SODIUM 70 MG/1
70 TABLET ORAL
Qty: 12 TABLET | Refills: 0 | Status: SHIPPED | OUTPATIENT
Start: 2025-03-24

## 2025-03-24 NOTE — TELEPHONE ENCOUNTER
Last OV: 2/11/2025  Next OV: 8/11/2025    Next appointment due:8/11/2025     Last fill:2/13/25  Refills:0

## 2025-07-14 RX ORDER — ALENDRONATE SODIUM 70 MG/1
70 TABLET ORAL
Qty: 12 TABLET | Refills: 0 | Status: SHIPPED | OUTPATIENT
Start: 2025-07-14

## 2025-08-11 ENCOUNTER — OFFICE VISIT (OUTPATIENT)
Dept: INTERNAL MEDICINE CLINIC | Age: 75
End: 2025-08-11
Payer: MEDICARE

## 2025-08-11 VITALS
BODY MASS INDEX: 19.49 KG/M2 | SYSTOLIC BLOOD PRESSURE: 116 MMHG | WEIGHT: 128.6 LBS | HEIGHT: 68 IN | OXYGEN SATURATION: 98 % | HEART RATE: 71 BPM | DIASTOLIC BLOOD PRESSURE: 66 MMHG

## 2025-08-11 DIAGNOSIS — M81.0 AGE-RELATED OSTEOPOROSIS WITHOUT CURRENT PATHOLOGICAL FRACTURE: ICD-10-CM

## 2025-08-11 DIAGNOSIS — E53.8 B12 DEFICIENCY: ICD-10-CM

## 2025-08-11 DIAGNOSIS — E78.5 HYPERLIPIDEMIA, UNSPECIFIED HYPERLIPIDEMIA TYPE: ICD-10-CM

## 2025-08-11 DIAGNOSIS — R73.09 ELEVATED GLUCOSE: ICD-10-CM

## 2025-08-11 DIAGNOSIS — I10 ESSENTIAL HYPERTENSION, BENIGN: ICD-10-CM

## 2025-08-11 DIAGNOSIS — F33.42 RECURRENT MAJOR DEPRESSIVE DISORDER, IN FULL REMISSION: Primary | ICD-10-CM

## 2025-08-11 DIAGNOSIS — F41.1 GENERALIZED ANXIETY DISORDER: ICD-10-CM

## 2025-08-11 DIAGNOSIS — G47.00 INSOMNIA, UNSPECIFIED TYPE: ICD-10-CM

## 2025-08-11 PROBLEM — Z78.9 ACTIVITY OF DAILY LIVING ALTERATION: Status: RESOLVED | Noted: 2023-05-26 | Resolved: 2025-08-11

## 2025-08-11 LAB
25(OH)D3 SERPL-MCNC: 46.8 NG/ML
ALBUMIN SERPL-MCNC: 4.5 G/DL (ref 3.4–5)
ALBUMIN/GLOB SERPL: 2.4 {RATIO} (ref 1.1–2.2)
ALP SERPL-CCNC: 43 U/L (ref 40–129)
ALT SERPL-CCNC: 23 U/L (ref 10–40)
ANION GAP SERPL CALCULATED.3IONS-SCNC: 9 MMOL/L (ref 3–16)
AST SERPL-CCNC: 19 U/L (ref 15–37)
BILIRUB SERPL-MCNC: 0.3 MG/DL (ref 0–1)
BUN SERPL-MCNC: 12 MG/DL (ref 7–20)
CALCIUM SERPL-MCNC: 10 MG/DL (ref 8.3–10.6)
CHLORIDE SERPL-SCNC: 101 MMOL/L (ref 99–110)
CHOLEST SERPL-MCNC: 148 MG/DL (ref 0–199)
CO2 SERPL-SCNC: 26 MMOL/L (ref 21–32)
CREAT SERPL-MCNC: 0.7 MG/DL (ref 0.6–1.2)
DEPRECATED RDW RBC AUTO: 13 % (ref 12.4–15.4)
EST. AVERAGE GLUCOSE BLD GHB EST-MCNC: 111.2 MG/DL
FOLATE SERPL-MCNC: 19.4 NG/ML (ref 4.78–24.2)
GFR SERPLBLD CREATININE-BSD FMLA CKD-EPI: >90 ML/MIN/{1.73_M2}
GLUCOSE SERPL-MCNC: 108 MG/DL (ref 70–99)
HBA1C MFR BLD: 5.5 %
HCT VFR BLD AUTO: 35.5 % (ref 36–48)
HDLC SERPL-MCNC: 60 MG/DL (ref 40–60)
HGB BLD-MCNC: 11.9 G/DL (ref 12–16)
LDLC SERPL CALC-MCNC: 78 MG/DL
MCH RBC QN AUTO: 30.8 PG (ref 26–34)
MCHC RBC AUTO-ENTMCNC: 33.6 G/DL (ref 31–36)
MCV RBC AUTO: 91.6 FL (ref 80–100)
PLATELET # BLD AUTO: 276 K/UL (ref 135–450)
PMV BLD AUTO: 9.9 FL (ref 5–10.5)
POTASSIUM SERPL-SCNC: 4.4 MMOL/L (ref 3.5–5.1)
PROT SERPL-MCNC: 6.4 G/DL (ref 6.4–8.2)
RBC # BLD AUTO: 3.88 M/UL (ref 4–5.2)
SODIUM SERPL-SCNC: 136 MMOL/L (ref 136–145)
TRIGL SERPL-MCNC: 51 MG/DL (ref 0–150)
VIT B12 SERPL-MCNC: 3281 PG/ML (ref 211–911)
VLDLC SERPL CALC-MCNC: 10 MG/DL
WBC # BLD AUTO: 3.9 K/UL (ref 4–11)

## 2025-08-11 PROCEDURE — 3078F DIAST BP <80 MM HG: CPT | Performed by: NURSE PRACTITIONER

## 2025-08-11 PROCEDURE — 99214 OFFICE O/P EST MOD 30 MIN: CPT | Performed by: NURSE PRACTITIONER

## 2025-08-11 PROCEDURE — G2211 COMPLEX E/M VISIT ADD ON: HCPCS | Performed by: NURSE PRACTITIONER

## 2025-08-11 PROCEDURE — 1123F ACP DISCUSS/DSCN MKR DOCD: CPT | Performed by: NURSE PRACTITIONER

## 2025-08-11 PROCEDURE — 1159F MED LIST DOCD IN RCRD: CPT | Performed by: NURSE PRACTITIONER

## 2025-08-11 PROCEDURE — 3074F SYST BP LT 130 MM HG: CPT | Performed by: NURSE PRACTITIONER

## 2025-08-11 RX ORDER — LOVASTATIN 20 MG/1
20 TABLET ORAL NIGHTLY
Qty: 90 TABLET | Refills: 1 | Status: SHIPPED | OUTPATIENT
Start: 2025-08-11

## 2025-08-11 RX ORDER — ALENDRONATE SODIUM 70 MG/1
70 TABLET ORAL
Qty: 12 TABLET | Refills: 3 | Status: SHIPPED | OUTPATIENT
Start: 2025-08-11

## 2025-08-11 RX ORDER — AMLODIPINE BESYLATE 5 MG/1
5 TABLET ORAL 2 TIMES DAILY
Qty: 180 TABLET | Refills: 1 | Status: SHIPPED | OUTPATIENT
Start: 2025-08-11

## 2025-08-11 RX ORDER — BUSPIRONE HYDROCHLORIDE 15 MG/1
15 TABLET ORAL 3 TIMES DAILY
Qty: 270 TABLET | Refills: 1 | Status: SHIPPED | OUTPATIENT
Start: 2025-08-11

## 2025-08-11 RX ORDER — DULOXETIN HYDROCHLORIDE 60 MG/1
60 CAPSULE, DELAYED RELEASE ORAL DAILY
Qty: 90 CAPSULE | Refills: 1 | Status: SHIPPED | OUTPATIENT
Start: 2025-08-11

## 2025-08-11 RX ORDER — TRAZODONE HYDROCHLORIDE 50 MG/1
50 TABLET ORAL NIGHTLY PRN
Qty: 90 TABLET | Refills: 1 | Status: SHIPPED | OUTPATIENT
Start: 2025-08-11

## 2025-08-11 RX ORDER — LISINOPRIL 20 MG/1
20 TABLET ORAL DAILY
Qty: 90 TABLET | Refills: 1 | Status: SHIPPED | OUTPATIENT
Start: 2025-08-11

## 2025-08-11 RX ORDER — CARVEDILOL 6.25 MG/1
6.25 TABLET ORAL 2 TIMES DAILY
Qty: 180 TABLET | Refills: 1 | Status: SHIPPED | OUTPATIENT
Start: 2025-08-11

## 2025-08-11 RX ORDER — ALENDRONATE SODIUM 70 MG/1
70 TABLET ORAL
Qty: 12 TABLET | Refills: 1 | Status: SHIPPED | OUTPATIENT
Start: 2025-08-11 | End: 2025-08-11 | Stop reason: SDUPTHER

## (undated) DEVICE — BANDAGE COBAN 6 IN WND 6INX5YD FOAM

## (undated) DEVICE — COVER,MAYO STAND,XL,STERILE: Brand: MEDLINE

## (undated) DEVICE — 3M™ STERI-DRAPE™ INSTRUMENT POUCH 1018: Brand: STERI-DRAPE™

## (undated) DEVICE — GLOVE SURG SZ 8.5 L11.85IN FNGR THK9.8MIL STRW LTX POLYMER

## (undated) DEVICE — TOTAL BASIC PK

## (undated) DEVICE — DRAPE,U/ SHT,SPLIT,PLAS,STERIL: Brand: MEDLINE

## (undated) DEVICE — PAD ABSRB W8XL10IN ABD HYDROPHOBIC NONWOVEN THCK LAYR CELOS

## (undated) DEVICE — FRAZIER SUCTION INSTRUMENT 8 FR W/CONTROL VENT & OBTURATOR: Brand: FRAZIER

## (undated) DEVICE — 3.0MM X 1000MM BALL TIP GUIDE ROD: Brand: TRIGEN

## (undated) DEVICE — PADDING UNDERCAST W4INXL4YD 100% COT CRIMPED FINISH WBRL II

## (undated) DEVICE — CHLORAPREP 26ML ORANGE

## (undated) DEVICE — SUTURE VCRL + SZ 1 L36IN ABSRB UD L36MM CT-1 1/2 CIR VCP947H

## (undated) DEVICE — TOWEL,OR,DSP,ST,BLUE,STD,6/PK,12PK/CS: Brand: MEDLINE

## (undated) DEVICE — BANDAGE,ELASTIC,ESMARK,STERILE,6"X9',LF: Brand: MEDLINE

## (undated) DEVICE — YANKAUER,OPEN TIP,W/O VENT,STERILE: Brand: MEDLINE INDUSTRIES, INC.

## (undated) DEVICE — Device

## (undated) DEVICE — HOOD: Brand: FLYTE

## (undated) DEVICE — BOWL MED L 32OZ PLAS W/ MOLD GRAD EZ OPN PEEL PCH

## (undated) DEVICE — COTTON UNDERCAST PADDING,CRIMPED FINISH: Brand: WEBRIL

## (undated) DEVICE — HYPODERMIC SAFETY NEEDLE: Brand: MAGELLAN

## (undated) DEVICE — SUTURE VCRL + SZ 2-0 L36IN ABSRB UD L36MM CT-1 1/2 CIR VCP945H

## (undated) DEVICE — SYRINGE MED 10ML TRNSLUC BRL PLUNG BLK MRK POLYPR CTRL

## (undated) DEVICE — SYRINGE, LUER LOCK, 10ML: Brand: MEDLINE

## (undated) DEVICE — 450 ML BOTTLE OF 0.05% CHLORHEXIDINE GLUCONATE IN 99.95% STERILE WATER FOR IRRIGATION, USP AND APPLICATOR.: Brand: IRRISEPT ANTIMICROBIAL WOUND LAVAGE

## (undated) DEVICE — MAJOR SET UP PK

## (undated) DEVICE — STOCKINETTE,IMPERVIOUS,12X48,STERILE: Brand: MEDLINE

## (undated) DEVICE — PADDING CAST W6INXL4YD ST COT RAYON MICROPLEATED HIGHLY

## (undated) DEVICE — GAUZE,SPONGE,4"X4",8PLY,STRL,LF,10/TRAY: Brand: MEDLINE

## (undated) DEVICE — SUTURE FIBERWIRE SZ 2-0 L38IN BLU L26.5MM DMND PT STR NDL AR7246

## (undated) DEVICE — DECANTER FLD 9IN ST BG FOR ASEP TRNSF OF FLD

## (undated) DEVICE — 19 IN KNEE IMMOBILIZER: Brand: DEROYAL

## (undated) DEVICE — TRIGEN LOW PROFILE SCREW 5.0MM X 65MM
Type: IMPLANTABLE DEVICE | Site: FEMUR | Status: NON-FUNCTIONAL
Brand: TRIGEN
Removed: 2022-09-21

## (undated) DEVICE — MERCY FAIRFIELD TURNOVER KIT: Brand: MEDLINE INDUSTRIES, INC.

## (undated) DEVICE — COMPONENT KNEE LOWER EXTREMITIES. ANCIL ZIRCONIUM NAVIO DISP

## (undated) DEVICE — HOOD, PEEL-AWAY: Brand: FLYTE

## (undated) DEVICE — NAVIO FLAT MARKERS: Brand: NAVIO

## (undated) DEVICE — COVER LT HNDL BLU PLAS

## (undated) DEVICE — BANDAGE,GAUZE,BULKEE II,4.5"X4.1YD,STRL: Brand: MEDLINE

## (undated) DEVICE — C-ARMOR C-ARM EQUIPMENT COVERS CLEAR STERILE UNIVERSAL FIT 12 PER CASE: Brand: C-ARMOR

## (undated) DEVICE — DRAPE C ARM UNIV W41XL74IN CLR PLAS XR VELC CLSR POLY STRP

## (undated) DEVICE — 2108 SERIES SAGITTAL BLADE FAN, OFFSET  (29.0 X 0.89 X 73.0MM)

## (undated) DEVICE — C-ARM: Brand: UNBRANDED

## (undated) DEVICE — GENESIS TROCHLEAR PIN 1/8 X 3: Brand: GENESIS

## (undated) DEVICE — GOWN,AURORA,NONREINF,RAGLAN,XXL,STERILE: Brand: MEDLINE

## (undated) DEVICE — SYSTEM SKIN CLSR 22CM DERMBND PRINEO

## (undated) DEVICE — SPONGE LAP W18XL18IN WHT COT 4 PLY FLD STRUNG RADPQ DISP ST

## (undated) DEVICE — SHEET,DRAPE,53X77,STERILE: Brand: MEDLINE

## (undated) DEVICE — TOWEL,OR,DSP,ST,BLUE,DLX,10/PK,8PK/CS: Brand: MEDLINE

## (undated) DEVICE — FAIRFIELD KNEE LF

## (undated) DEVICE — SPLINT KNEE UNIV FOR LESS THAN 36IN L20IN FOAM LAM E CNTCT

## (undated) DEVICE — KIT STEREOTAXIC POD DISPOSABLE IASSIST

## (undated) DEVICE — APPLICATOR MEDICATED 26 CC SOLUTION HI LT ORNG CHLORAPREP

## (undated) DEVICE — DRESSING,GAUZE,XEROFORM,CURAD,5"X9",ST: Brand: CURAD

## (undated) DEVICE — BIT DRL L300MM DIA3.2MM PERC QUIK CPL CALIB W/O STP REUSE

## (undated) DEVICE — GLOVE ORANGE PI 8 1/2   MSG9085

## (undated) DEVICE — SUTURE MCRYL SZ 3-0 L27IN ABSRB UD L24MM PS-1 3/8 CIR PRIM Y936H

## (undated) DEVICE — HANDPIECE SET WITH HIGH FLOW TIP AND SUCTION TUBE: Brand: INTERPULSE

## (undated) DEVICE — Device: Brand: STABLECUT®

## (undated) DEVICE — DEVICE POS W4INXL5YD KNEE SURG FOAM PD SELF ADH WRP DEMAYO

## (undated) DEVICE — BANDAGE COMPR W6INXL10YD ST M E WHITE/BEIGE

## (undated) DEVICE — TOWEL,OR,DSP,ST,BLUE,STD,4/PK,20PK/CS: Brand: MEDLINE

## (undated) DEVICE — GUIDEPIN ORTH L343MM DIA3.2MM FOR TRIGEN INTERTAN PROX LOK

## (undated) DEVICE — INTENDED FOR TISSUE SEPARATION, AND OTHER PROCEDURES THAT REQUIRE A SHARP SURGICAL BLADE TO PUNCTURE OR CUT.: Brand: BARD-PARKER ® STAINLESS STEEL BLADES

## (undated) DEVICE — YANKAUER OPEN TIP, NO VENT: Brand: ARGYLE

## (undated) DEVICE — 3M™ IOBAN™ 2 ANTIMICROBIAL INCISE DRAPE 6650EZ: Brand: IOBAN™ 2

## (undated) DEVICE — GUIDEWIRE ORTH DIA2.5MM LOK SMOOTH DRL TIP FLX THRD FOR

## (undated) DEVICE — ELECTRODE PT RET AD L9FT HI MOIST COND ADH HYDRGEL CORDED

## (undated) DEVICE — SUTURE VCRL SZ 0 L18IN ABSRB UD L36MM CT-1 1/2 CIR J840D

## (undated) DEVICE — BIT DRL L300MM DIA4.3MM QUIK CPL PERC CALIB W/O STP REUSE

## (undated) DEVICE — T-DRAPE,EXTREMITY,STERILE: Brand: MEDLINE

## (undated) DEVICE — SUTURE STRATAFIX SZ 1 L14IN ABSRB VLT L36MM MO-4 TAPERPOINT SXPD2B400

## (undated) DEVICE — 35 ML SYRINGE LUER-LOCK TIP: Brand: MONOJECT

## (undated) DEVICE — STANDARD HYPODERMIC NEEDLE,POLYPROPYLENE HUB: Brand: MONOJECT

## (undated) DEVICE — 3M™ STERI-DRAPE™ U-DRAPE 1015: Brand: STERI-DRAPE™

## (undated) DEVICE — STAPLER SKIN H3.9MM WIRE DIA0.58MM CRWN 6.9MM 35 STPL ROT

## (undated) DEVICE — STERILE TOTAL KNEE DRAPE PACK: Brand: CARDINAL HEALTH

## (undated) DEVICE — SUTURE VCRL SZ 2-0 L18IN ABSRB UD CT-1 L36MM 1/2 CIR J839D

## (undated) DEVICE — DRESSING CNTCT 4X12IN IS THERABOND 3D

## (undated) DEVICE — 4.0MM LONG AO PILOT DRILL: Brand: TRIGEN

## (undated) DEVICE — ZIMMER® STERILE DISPOSABLE TOURNIQUET CUFF WITH PLC, DUAL PORT, SINGLE BLADDER, 30 IN. (76 CM)

## (undated) DEVICE — PEN: MARKING STD 100/CS: Brand: MEDICAL ACTION INDUSTRIES

## (undated) DEVICE — GLOVE SURG 7 LTX STRL TRIFLEX LNG FINGER